# Patient Record
Sex: MALE | Race: BLACK OR AFRICAN AMERICAN | NOT HISPANIC OR LATINO | URBAN - METROPOLITAN AREA
[De-identification: names, ages, dates, MRNs, and addresses within clinical notes are randomized per-mention and may not be internally consistent; named-entity substitution may affect disease eponyms.]

---

## 2021-08-18 ENCOUNTER — EMERGENCY (EMERGENCY)
Facility: HOSPITAL | Age: 41
LOS: 1 days | Discharge: HOME | End: 2021-08-18
Admitting: EMERGENCY MEDICINE
Payer: MEDICAID

## 2021-08-18 VITALS
RESPIRATION RATE: 19 BRPM | OXYGEN SATURATION: 100 % | TEMPERATURE: 98 F | DIASTOLIC BLOOD PRESSURE: 87 MMHG | SYSTOLIC BLOOD PRESSURE: 119 MMHG | HEART RATE: 100 BPM

## 2021-08-18 PROCEDURE — 99284 EMERGENCY DEPT VISIT MOD MDM: CPT

## 2021-08-18 NOTE — ED ADULT TRIAGE NOTE - CHIEF COMPLAINT QUOTE
Pt c/o right arm pain/tingling since sunday worsening today. Returned from 48hr drive to [place] today. Denies chest pain, n/v, injury, heavy lifting, PMHX/Medications

## 2021-08-19 VITALS
RESPIRATION RATE: 18 BRPM | SYSTOLIC BLOOD PRESSURE: 123 MMHG | HEART RATE: 74 BPM | OXYGEN SATURATION: 100 % | DIASTOLIC BLOOD PRESSURE: 89 MMHG

## 2021-08-19 RX ORDER — IBUPROFEN 200 MG
800 TABLET ORAL ONCE
Refills: 0 | Status: COMPLETED | OUTPATIENT
Start: 2021-08-19 | End: 2021-08-19

## 2021-08-19 RX ORDER — KETOROLAC TROMETHAMINE 30 MG/ML
30 SYRINGE (ML) INJECTION ONCE
Refills: 0 | Status: DISCONTINUED | OUTPATIENT
Start: 2021-08-19 | End: 2021-08-19

## 2021-08-19 RX ORDER — LIDOCAINE 4 G/100G
1 CREAM TOPICAL ONCE
Refills: 0 | Status: COMPLETED | OUTPATIENT
Start: 2021-08-19 | End: 2021-08-19

## 2021-08-19 RX ORDER — DIAZEPAM 5 MG
5 TABLET ORAL ONCE
Refills: 0 | Status: DISCONTINUED | OUTPATIENT
Start: 2021-08-19 | End: 2021-08-19

## 2021-08-19 RX ORDER — LIDOCAINE 4 G/100G
1 CREAM TOPICAL ONCE
Refills: 0 | Status: DISCONTINUED | OUTPATIENT
Start: 2021-08-19 | End: 2021-08-19

## 2021-08-19 RX ADMIN — Medication 5 MILLIGRAM(S): at 00:11

## 2021-08-19 RX ADMIN — LIDOCAINE 1 PATCH: 4 CREAM TOPICAL at 01:04

## 2021-08-19 RX ADMIN — Medication 30 MILLIGRAM(S): at 00:11

## 2021-08-19 RX ADMIN — Medication 800 MILLIGRAM(S): at 01:05

## 2021-08-19 RX ADMIN — LIDOCAINE 1 PATCH: 4 CREAM TOPICAL at 00:24

## 2021-08-19 NOTE — ED PROVIDER NOTE - NSFOLLOWUPINSTRUCTIONS_ED_ALL_ED_FT
LOV: 6/19/17  RTC: 2 weeks  Last Labs: CT shows pneumonia to see pulmonologist  Future Appointments  Date Time Provider Attila Kearns   7/5/2017 12:20 PM Ignacio Salas MD [facility] [address]   5/4/2018 11:00 AM Skip Hill  [address] Cervical radiculopathy is a painful condition that happens when a spinal nerve in your neck is pinched or irritated.     Vertebral Column    DISCHARGE INSTRUCTIONS:    Medicines: You may need any of the following:  •NSAIDs help decrease swelling and pain. This medicine can be bought without a doctor's order. This medicine can cause stomach bleeding or kidney problems in certain people. If you take blood thinner medicine, always ask your healthcare provider if NSAIDs are safe for you. Always read the medicine label and follow the directions on it before using this medicine.    •Prescription pain medicine helps decrease pain. Do not wait until the pain is severe before you take this medicine.    •Steroids help decrease pain and swelling. These may be given as a pill or as an injection in your neck. You may need more than 1 injection if your symptoms do not improve after the first treatment.     •Take your medicine as directed. Contact your healthcare provider if you think your medicine is not helping or if you have side effects. Tell him of her if you are allergic to any medicine. Keep a list of the medicines, vitamins, and herbs you take. Include the amounts, and when and why you take them. Bring the list or the pill bottles to follow-up visits. Carry your medicine list with you in case of an emergency.    Follow up with your healthcare provider or spine specialist as directed: Write down your questions so you remember to ask them during your visits.     Physical therapy: Your healthcare provider may suggest physical therapy to stretch and strengthen your muscles. Your physical therapist can teach you how to improve your posture and the way you hold your neck. He may also teach you how to be safely active and avoid further injury. He can also help you develop an exercise program that is safe for your back and neck.     Self-care:   •Ice helps decrease swelling and pain. Ice may also help prevent tissue damage. Use an ice pack, or put crushed ice in a plastic bag. Cover it with a towel and place it on your neck for 15 to 20 minutes every hour or as directed.    •Rest when you feel it is needed. Slowly start to do more each day. Return to your daily activities as directed.     •Wear a soft collar. You may be given a soft collar to support your neck while you sleep. Wear the soft collar only as directed.  Cervical Collars     •Do light stretches and regular exercise. Your healthcare provider may suggest light stretches to help decrease stiffness in your neck and arm as you recover. After your pain is controlled, you may benefit from regular exercise. Ask what type of exercise is safe for your back and neck.     •Review your work area. A comfortable work area can help prevent neck strain. Ask your employer for an ergonomic review to check the position of your desk, chair, phone, and computer. Make any necessary adjustments for your comfort.     Contact your healthcare provider or spine specialist if:   •You have a fever.    •You are losing weight without trying.    •Your pain is worse, even with medicine.    •One or both hands feel more numb than before, or you cannot move your fingers well.    •You have questions or concerns about your condition or care.

## 2021-08-19 NOTE — ED PROVIDER NOTE - CLINICAL SUMMARY MEDICAL DECISION MAKING FREE TEXT BOX
patient presenting with radiating tingling from neck to RUE. no weakness or sensory deficits appreciated. patient will be treated with analgesic, muscle relaxant, lidocaine patch. he will be discahrge and provided referral to ortho clinic

## 2021-08-19 NOTE — ED PROVIDER NOTE - OBJECTIVE STATEMENT
patient is a 42 y/o M with no sig pmhx presenting with right sided shoulder pain that is radiating from his neck to the distal aspect of his right arm. He endorses strenous activity prior to the onset of symptoms. He denies chest pain, numbness, weakness, loss of motor function, ha, fever, chills.

## 2021-08-19 NOTE — ED ADULT NURSE NOTE - OBJECTIVE STATEMENT
41 year old male presents A&Ox4, complaining of right arm pain 7/10 x1 week. Pt states he has been doing a lot of heavy lifting and returned from a long drive this weekend. Pt states pain is like a cramping sensation and does not radiate anywhere. Respirations even and unlabored, speaking in full sentences without any difficulty, sating 100% on room air. Pt denies any chest pain, dyspnea, dizziness, blurry vision, nausea, vomiting or diarrhea. Pulse motor sensation and strength equal and present in all 4 extremities. Pt well appearing and in NAD at this time. Ambulatory with steady gait. Will continue to monitor.

## 2021-08-19 NOTE — ED PROVIDER NOTE - PATIENT PORTAL LINK FT
You can access the FollowMyHealth Patient Portal offered by [facility] by registering at the following website: [email or web address]. By joining [facility]’s FollowMyHealth portal, you will also be able to view your health information using other applications (apps) compatible with our system.

## 2021-08-19 NOTE — ED PROVIDER NOTE - MUSCULOSKELETAL, MLM
+ tenderness to the right trapezius muscle. FROM of shoulder/elbow/wrist. no sensory deficits appreciated. pulses intact.Spine appears normal, range of motion is not limited, no muscle or joint tenderness

## 2021-08-19 NOTE — ED PROVIDER NOTE - CCCP TRG CHIEF CMPLNT
Problem: At Risk for Falls  Goal: # Patient does not fall  Outcome: Outcome Met, Continue evaluating goal progress toward completion     Patient educated on fall prevention measures. Call light within reach, frequent monitoring throughout the night. Bed alarm on   right arm pain/tingling

## 2021-08-21 ENCOUNTER — INPATIENT (INPATIENT)
Facility: HOSPITAL | Age: 41
LOS: 8 days | Discharge: ANOTHER HOSPITAL | End: 2021-08-30
Attending: INTERNAL MEDICINE | Admitting: INTERNAL MEDICINE
Payer: MEDICAID

## 2021-08-21 VITALS
OXYGEN SATURATION: 99 % | HEART RATE: 95 BPM | SYSTOLIC BLOOD PRESSURE: 130 MMHG | RESPIRATION RATE: 18 BRPM | TEMPERATURE: 99 F | DIASTOLIC BLOOD PRESSURE: 79 MMHG

## 2021-08-21 DIAGNOSIS — I20.9 ANGINA PECTORIS, UNSPECIFIED: ICD-10-CM

## 2021-08-21 DIAGNOSIS — M54.2 CERVICALGIA: ICD-10-CM

## 2021-08-21 DIAGNOSIS — M25.511 PAIN IN RIGHT SHOULDER: ICD-10-CM

## 2021-08-21 DIAGNOSIS — F17.200 NICOTINE DEPENDENCE, UNSPECIFIED, UNCOMPLICATED: ICD-10-CM

## 2021-08-21 LAB
ALBUMIN SERPL ELPH-MCNC: 4 G/DL — SIGNIFICANT CHANGE UP (ref 3.3–5)
ALP SERPL-CCNC: 79 U/L — SIGNIFICANT CHANGE UP (ref 40–120)
ALT FLD-CCNC: SIGNIFICANT CHANGE UP U/L (ref 4–41)
ANION GAP SERPL CALC-SCNC: 12 MMOL/L — SIGNIFICANT CHANGE UP (ref 7–14)
AST SERPL-CCNC: SIGNIFICANT CHANGE UP U/L (ref 4–40)
BASOPHILS # BLD AUTO: 0.07 K/UL — SIGNIFICANT CHANGE UP (ref 0–0.2)
BASOPHILS NFR BLD AUTO: 0.6 % — SIGNIFICANT CHANGE UP (ref 0–2)
BILIRUB SERPL-MCNC: 0.2 MG/DL — SIGNIFICANT CHANGE UP (ref 0.2–1.2)
BUN SERPL-MCNC: 13 MG/DL — SIGNIFICANT CHANGE UP (ref 7–23)
CALCIUM SERPL-MCNC: 9.1 MG/DL — SIGNIFICANT CHANGE UP (ref 8.4–10.5)
CHLORIDE SERPL-SCNC: 104 MMOL/L — SIGNIFICANT CHANGE UP (ref 98–107)
CHOLEST SERPL-MCNC: 227 MG/DL — HIGH
CK SERPL-CCNC: 270 U/L — HIGH (ref 30–200)
CO2 SERPL-SCNC: 24 MMOL/L — SIGNIFICANT CHANGE UP (ref 22–31)
CREAT SERPL-MCNC: 1.07 MG/DL — SIGNIFICANT CHANGE UP (ref 0.5–1.3)
D DIMER BLD IA.RAPID-MCNC: <150 NG/ML DDU — SIGNIFICANT CHANGE UP
EOSINOPHIL # BLD AUTO: 0.58 K/UL — HIGH (ref 0–0.5)
EOSINOPHIL NFR BLD AUTO: 5.3 % — SIGNIFICANT CHANGE UP (ref 0–6)
GLUCOSE SERPL-MCNC: 104 MG/DL — HIGH (ref 70–99)
HCT VFR BLD CALC: 46.3 % — SIGNIFICANT CHANGE UP (ref 39–50)
HDLC SERPL-MCNC: 21 MG/DL — LOW
HGB BLD-MCNC: 15.5 G/DL — SIGNIFICANT CHANGE UP (ref 13–17)
IANC: 6.89 K/UL — SIGNIFICANT CHANGE UP (ref 1.5–8.5)
IMM GRANULOCYTES NFR BLD AUTO: 0.3 % — SIGNIFICANT CHANGE UP (ref 0–1.5)
LIPID PNL WITH DIRECT LDL SERPL: 47 MG/DL — SIGNIFICANT CHANGE UP
LYMPHOCYTES # BLD AUTO: 2.94 K/UL — SIGNIFICANT CHANGE UP (ref 1–3.3)
LYMPHOCYTES # BLD AUTO: 26.7 % — SIGNIFICANT CHANGE UP (ref 13–44)
MCHC RBC-ENTMCNC: 29.4 PG — SIGNIFICANT CHANGE UP (ref 27–34)
MCHC RBC-ENTMCNC: 33.5 GM/DL — SIGNIFICANT CHANGE UP (ref 32–36)
MCV RBC AUTO: 87.7 FL — SIGNIFICANT CHANGE UP (ref 80–100)
MONOCYTES # BLD AUTO: 0.51 K/UL — SIGNIFICANT CHANGE UP (ref 0–0.9)
MONOCYTES NFR BLD AUTO: 4.6 % — SIGNIFICANT CHANGE UP (ref 2–14)
NEUTROPHILS # BLD AUTO: 6.89 K/UL — SIGNIFICANT CHANGE UP (ref 1.8–7.4)
NEUTROPHILS NFR BLD AUTO: 62.5 % — SIGNIFICANT CHANGE UP (ref 43–77)
NON HDL CHOLESTEROL: 206 MG/DL — HIGH
NRBC # BLD: 0 /100 WBCS — SIGNIFICANT CHANGE UP
NRBC # FLD: 0 K/UL — SIGNIFICANT CHANGE UP
PLATELET # BLD AUTO: 309 K/UL — SIGNIFICANT CHANGE UP (ref 150–400)
POTASSIUM SERPL-MCNC: 4.5 MMOL/L — SIGNIFICANT CHANGE UP (ref 3.5–5.3)
POTASSIUM SERPL-SCNC: 4.5 MMOL/L — SIGNIFICANT CHANGE UP (ref 3.5–5.3)
PROT SERPL-MCNC: 6.7 G/DL — SIGNIFICANT CHANGE UP (ref 6–8.3)
RBC # BLD: 5.28 M/UL — SIGNIFICANT CHANGE UP (ref 4.2–5.8)
RBC # FLD: 13.7 % — SIGNIFICANT CHANGE UP (ref 10.3–14.5)
SARS-COV-2 RNA SPEC QL NAA+PROBE: SIGNIFICANT CHANGE UP
SODIUM SERPL-SCNC: 140 MMOL/L — SIGNIFICANT CHANGE UP (ref 135–145)
TRIGL SERPL-MCNC: 793 MG/DL — HIGH
TROPONIN T, HIGH SENSITIVITY RESULT: 18 NG/L — SIGNIFICANT CHANGE UP
TROPONIN T, HIGH SENSITIVITY RESULT: 18 NG/L — SIGNIFICANT CHANGE UP
TROPONIN T, HIGH SENSITIVITY RESULT: 20 NG/L — SIGNIFICANT CHANGE UP
WBC # BLD: 11.02 K/UL — HIGH (ref 3.8–10.5)
WBC # FLD AUTO: 11.02 K/UL — HIGH (ref 3.8–10.5)

## 2021-08-21 PROCEDURE — 99223 1ST HOSP IP/OBS HIGH 75: CPT

## 2021-08-21 PROCEDURE — 73060 X-RAY EXAM OF HUMERUS: CPT | Mod: 26,RT

## 2021-08-21 PROCEDURE — 71046 X-RAY EXAM CHEST 2 VIEWS: CPT | Mod: 26

## 2021-08-21 PROCEDURE — 99284 EMERGENCY DEPT VISIT MOD MDM: CPT | Mod: 25

## 2021-08-21 PROCEDURE — 73090 X-RAY EXAM OF FOREARM: CPT | Mod: 26,RT

## 2021-08-21 PROCEDURE — 73030 X-RAY EXAM OF SHOULDER: CPT | Mod: 26,RT

## 2021-08-21 PROCEDURE — 73070 X-RAY EXAM OF ELBOW: CPT | Mod: 26,RT

## 2021-08-21 PROCEDURE — 93010 ELECTROCARDIOGRAM REPORT: CPT

## 2021-08-21 RX ORDER — ASPIRIN/CALCIUM CARB/MAGNESIUM 324 MG
324 TABLET ORAL ONCE
Refills: 0 | Status: COMPLETED | OUTPATIENT
Start: 2021-08-21 | End: 2021-08-21

## 2021-08-21 RX ORDER — ATORVASTATIN CALCIUM 80 MG/1
40 TABLET, FILM COATED ORAL AT BEDTIME
Refills: 0 | Status: DISCONTINUED | OUTPATIENT
Start: 2021-08-21 | End: 2021-08-30

## 2021-08-21 RX ORDER — OXYCODONE AND ACETAMINOPHEN 5; 325 MG/1; MG/1
1 TABLET ORAL ONCE
Refills: 0 | Status: DISCONTINUED | OUTPATIENT
Start: 2021-08-21 | End: 2021-08-21

## 2021-08-21 RX ORDER — IBUPROFEN 200 MG
400 TABLET ORAL EVERY 8 HOURS
Refills: 0 | Status: DISCONTINUED | OUTPATIENT
Start: 2021-08-21 | End: 2021-08-22

## 2021-08-21 RX ADMIN — Medication 324 MILLIGRAM(S): at 18:45

## 2021-08-21 RX ADMIN — OXYCODONE AND ACETAMINOPHEN 1 TABLET(S): 5; 325 TABLET ORAL at 18:55

## 2021-08-21 RX ADMIN — OXYCODONE AND ACETAMINOPHEN 1 TABLET(S): 5; 325 TABLET ORAL at 17:49

## 2021-08-21 NOTE — ED ADULT NURSE NOTE - TEMPLATE
Sore throat slightly improved, swollen cervical glands; pt lost voice. Pt denies: fever, cough r/t talking, SOB. Pt denies: recent travel, known contact with confirmed covid19 pt.  Pt tried gargling with slat water, drinking lots of tea, Tylenol, daytime co General

## 2021-08-21 NOTE — ED ADULT NURSE NOTE - OBJECTIVE STATEMENT
pt is a 41 yr old male c/o increased right shoulder and right hand pain, aching x2 weeks. pt seen and treated in ED, non cardiac at the time. pt showing same poor ekg from pervious visit. pt denies chest pain, ha, dizziness, lightheadedness, trama. #20 g piv in left ac, labs sent. 22yr pack/day smoker.

## 2021-08-21 NOTE — H&P ADULT - NSHPLABSRESULTS_GEN_ALL_CORE
15.5   11.02 )-----------( 309      ( 21 Aug 2021 17:17 )             46.3       08-21    140  |  104  |  13  ----------------------------<  104<H>  4.5   |  24  |  1.07    Ca    9.1      21 Aug 2021 17:17    TPro  6.7  /  Alb  4.0  /  TBili  0.2  /  DBili  x   /  AST  TNP  /  ALT  TNP  /  AlkPhos  79  08-21

## 2021-08-21 NOTE — H&P ADULT - NSHPPHYSICALEXAM_GEN_ALL_CORE
PHYSICAL EXAM:  VITALS: Vital Signs Last 24 Hrs  T(C): 36.7 (21 Aug 2021 18:15), Max: 37 (21 Aug 2021 16:23)  T(F): 98.1 (21 Aug 2021 18:15), Max: 98.6 (21 Aug 2021 16:23)  HR: 87 (21 Aug 2021 21:15) (86 - 95)  BP: 129/100 (21 Aug 2021 21:15) (129/100 - 135/99)  BP(mean): --  RR: 19 (21 Aug 2021 21:15) (18 - 19)  SpO2: 97% (21 Aug 2021 21:15) (97% - 99%)  GENERAL: NAD, well-developed, well-nourished  HEAD:  Atraumatic, Normocephalic  EYES: EOMI, PERRL, conjunctiva and sclera clear  ENT: Moist Mucus Membranes present, no ulcers appreciated  NECK: Supple, No JVD  CHEST/LUNG: Clear to auscultation bilaterally; No wheezes, rales or rhonchi  HEART: Regular rate and rhythm; No murmurs, rubs, or gallops, (+)S1, S2  ABDOMEN: Soft, Nontender, Nondistended; Normal Bowel sounds   EXTREMITIES:  2+ Peripheral Pulses, No clubbing, cyanosis, or edema, + R shoulder and R arm tenderness with neck extension and abduction of R arm  PSYCH: normal mood and affect  NEUROLOGY: AAOx3, non-focal  SKIN: No rashes or lesions

## 2021-08-21 NOTE — ED PROVIDER NOTE - OBJECTIVE STATEMENT
Dr Woodall  40yo M no sig pmhx pw right arm pain x 5 days. Constant, non exertional, non pleuritic right arm pain that radiates from the neck. Denies any trauma. Endorse he "lifts a lot of stuff at work" prior to pain starting. No associated n/v/d no palpitations. Denies any chest pain or SOB no diaphoresis no travel. No numbness or weakness Taking tylenol for pain w no relief. Vaccinated for covid No sig family hx of CAD. Doesn't have a cardiologist. no hx of stress/echo. Dr Woodall  40yo M no sig pmhx pw right arm pain x 5 days. Constant, non exertional, non pleuritic right arm pain that radiates from the neck. Denies any trauma. Endorse he "lifts a lot of stuff at work" prior to pain starting. No associated n/v/d no palpitations. Denies any chest pain or SOB no diaphoresis no travel. No numbness or weakness Taking tylenol for pain w no relief. Vaccinated for covid No sig family hx of CAD. Doesn't have a cardiologist. no hx of stress/echo. Pt from [place]

## 2021-08-21 NOTE — H&P ADULT - ASSESSMENT
This is a 40 y/o M with no pmhx presented for R shoulder pain started acutely 6 days ago. Incidentally found to have mild ST changes. Trops negative. Admitted for ACS work up and evaluation of R shoulder pain.

## 2021-08-21 NOTE — ED PROVIDER NOTE - PHYSICAL EXAMINATION
Dr Woodall  well appearing male   normal S1-S2 no reproducible chest wall tenderness   No resp distress. able to speak in full and clear sentences. no wheeze, rales or stridor.  nl rom of both arms. nl . no rash or erythema of both shoulders/elbow/wrist . no vesicles. no swelling of both arms. nl sensation  no midline TTP cervical/thoracic or lumbar spine. no ecchymosis of back   soft nontender abdomen. no  rebound. no guarding. no sign of trauma. no CVAT   no pedal edema. no calf tenderness. normal pulses bilateral feet.

## 2021-08-21 NOTE — ED PROVIDER NOTE - PROGRESS NOTE DETAILS
PT denies any cp or so. Endorse right arm pain/shoulder pain. Repeat EKG: HR 78 SR w IAN III, amd inversion avl, v6. Contacted Dr Mendez, interventional cardiology, given EKG. Send ekgs for his review. will call back. Dr Mendez recommends repeat trop to trend, no cath at this time. Cardiology np to see pt. pt stable No change in trop, Cardiology NP recommends tele doctor, she will talk with Dr Mendez. pt has no cp will admit to tele doc

## 2021-08-21 NOTE — H&P ADULT - HISTORY OF PRESENT ILLNESS
This is a 40 y/o M with no pmhx presented for R shoulder pain started acutely 6 days ago. The patient was lifting heavy objects in his house before onset of pain. Pain started on the R neck and would radiate downward towards his arms. Denies trauma to the area. Denies weakness to the R arm. Pain is sharp like. Worse with movement when abducting his arms and flexion upward. Pain is constant. Denies numbness or tingling. Physical exam at bedside showed reproducible R shoulder/arm pain when the patient's head is extended backward and R arm raised upward.    He was in the ED 2 days ago for the same complaint, was discharged with pain meds.    He is also an active smoker. Patient is admitted for ACS work up. Currently denies active chest pain. However he does endorse occasional pressure-like chest pain when he is running. Active smoker of 1ppk/day. Denies hx of HLD, HTN or DM. No family hx of CAD or MI. Does not see a cardiologist. EKG at bedside showed Lead II, III and AvF IAN 1 mm, V6 T wave inversions, which was the same from 2 days ago.     Denies hx of surgeries or metal in body This is a 40 y/o M with no pmhx presented for R shoulder pain started acutely 6 days ago. The patient was lifting heavy objects in his house before onset of pain. Pain started on the R neck and would radiate downward towards his arms. Denies trauma to the area. Denies weakness to the R arm. Pain is sharp like. Worse with movement when abducting his arms and flexion upward. Pain is constant. Denies numbness or tingling. Physical exam at bedside showed reproducible R shoulder/arm pain when the patient's head is extended backward and R arm raised upward.    He was in the ED 2 days ago for the same complaint, was discharged with pain meds.    He is also an active smoker. Patient is admitted for ACS work up. Currently denies active chest pain. However he does endorse occasional pressure-like chest pain when he is running. Active smoker of 1ppk/day. Denies hx of HLD, HTN or DM. No family hx of CAD or MI. Does not see a cardiologist. EKG at bedside showed Lead II, III and AvF IAN 1 mm, V6 T wave inversions, which was the same from 2 days ago.     Denies hx of surgeries or metal in body. Does not take daily medications.

## 2021-08-21 NOTE — ED ADULT TRIAGE NOTE - CHIEF COMPLAINT QUOTE
Pt c/o R arm pain since last Sunday. Pt seen here on 8/18/21 with same S&S, had relief but pain worsening again. Denies numbness/tingling. Denies any pertinent medical Hx. Pt c/o R arm pain since last Sunday. Pt seen here on 8/18/21 with same S&S, had relief but pain worsening again. Denies numbness/tingling. Denies any pertinent medical Hx.]    Abn EKG, charge made aware

## 2021-08-21 NOTE — H&P ADULT - NSHPREVIEWOFSYSTEMS_GEN_ALL_CORE
REVIEW OF SYSTEMS:    CONSTITUTIONAL: No weakness, fevers or chills  EYES/ENT: No visual changes;  No dysphagia; No sore throat; No rhinorrhea; No sinus pain/pressure  NECK: + neck pain, no stiffness  RESPIRATORY: No cough, wheezing, hemoptysis; No shortness of breath  CARDIOVASCULAR: No chest pain or palpitations; No lower extremity edema  GASTROINTESTINAL: No abdominal or epigastric pain. No nausea, vomiting, or hematemesis; No diarrhea or constipation. No melena or hematochezia.  GENITOURINARY: No dysuria, frequency or hematuria  NEUROLOGICAL: No numbness or weakness  MSK: ambulates without assistance, + R shoulder pain  SKIN: No itching, burning, rashes, or lesions   All other review of systems is negative unless indicated above.

## 2021-08-21 NOTE — ED ADULT NURSE NOTE - CHIEF COMPLAINT QUOTE
Pt c/o R arm pain since last Sunday. Pt seen here on 8/18/21 with same S&S, had relief but pain worsening again. Denies numbness/tingling. Denies any pertinent medical Hx.]    Abn EKG, charge made aware

## 2021-08-21 NOTE — H&P ADULT - NSICDXFAMILYHX_GEN_ALL_CORE_FT
FAMILY HISTORY:  Mother  Still living? Unknown  Family history of diabetes mellitus (DM), Age at diagnosis: Age Unknown  FH: HTN (hypertension), Age at diagnosis: Age Unknown  FH: rheumatoid arthritis, Age at diagnosis: Age Unknown

## 2021-08-21 NOTE — H&P ADULT - PROBLEM SELECTOR PLAN 1
Assessment:  - the patient presented for R shoulder pain which was thought to be related to ACS  - Patient currently denies all chest pain, however does endorse anginal chest pain with running occasionally. Never had work up previously. Hx of smoking  - EKG shows Lead II, III, AvF 1mm IAN and V6 T wave inversion, which is similar to previous EKG 2 days ago, no changes  - Troponins negative (18->20)  - patient unlikely to have active ACS at this time due to neg tropx x2 and unchanged EKG. Patient currently sitting at bedside comfortable with no symptoms of ACS. Not in acute distress.    Plan:  - due to hx of active smoking and EKG findings, and findings of anginal chest pain, would recommend stress test for evaluation of ischemic heart disease, consider cath if stress test is positive  - cardiology consult  - obtain echo  - no indication for heparin drip at this time because the patient does not have active chest pain and neg troponins  - will obtain repeat STAT troponins, repeat EKG tonight  - will obtain Lipid profile and A1c for risk stratification Assessment:  - the patient presented for R shoulder pain which was thought to be related to ACS  - Patient currently denies all chest pain, however does endorse anginal chest pain with running occasionally. Never had work up previously. Hx of smoking  - EKG shows Lead II, III, AvF 1mm IAN and V6 T wave inversion, which is similar to previous EKG 2 days ago, no changes  - Troponins negative (18->20)  - patient unlikely to have active ACS at this time due to neg tropx x2 and unchanged EKG. Patient currently sitting at bedside comfortable with no symptoms of ACS. Not in acute distress.  - d-dimer neg - unlikely PE    Plan:  - due to hx of active smoking and EKG findings, and findings of anginal chest pain, would recommend stress test for evaluation of ischemic heart disease, consider cath if stress test is positive  - cardiology consult  - obtain echo  - no indication for heparin drip at this time because the patient does not have active chest pain and neg troponins  - will obtain repeat STAT troponins, repeat EKG tonight  - will obtain Lipid profile and A1c for risk stratification Assessment:  - the patient presented for R shoulder pain which was thought to be related to ACS  - Patient currently denies all chest pain, however does endorse anginal chest pain with running occasionally. Never had work up previously. Hx of smoking  - EKG shows Lead II, III, AvF 1mm IAN and V6 T wave inversion, which is similar to previous EKG 2 days ago, no changes  - Troponins negative (18->20)  - patient unlikely to have active ACS at this time due to neg tropx x2 and unchanged EKG. Patient currently sitting at bedside comfortable with no symptoms of ACS. Not in acute distress.  - d-dimer neg - unlikely PE    Plan:  - due to hx of active smoking and EKG findings, and findings of anginal chest pain, would recommend stress test for evaluation of ischemic heart disease, consider cath if stress test is positive  - cardiology consult  - obtain echo  - no indication for heparin drip at this time because the patient does not have active chest pain and neg troponins  - will obtain repeat STAT troponins, repeat EKG tonight - if pos, will start heparin drip  - tele monitoring  - will obtain Lipid profile and A1c for risk stratification Assessment:  - the patient presented for R shoulder pain which was thought to be related to ACS  - Patient currently denies all chest pain, however does endorse anginal chest pain with running occasionally. Never had work up previously. Hx of smoking  - EKG shows Lead II, III, AvF 1mm IAN and V6 T wave inversion, which is similar to previous EKG 2 days ago, no changes  - Troponins negative (18->20)  - patient unlikely to have active ACS at this time due to neg tropx x2 and unchanged EKG. Patient currently sitting at bedside comfortable with no symptoms of ACS. Not in acute distress.  - d-dimer neg - unlikely PE    Plan:  - due to hx of active smoking and EKG findings, and findings of anginal chest pain, would recommend stress test for evaluation of ischemic heart disease, consider cath if stress test is positive  - cardiology consult - I spoke to the Cardio PA, who spoke to Dr. Mendez. There is no indication for emergent cath at this time as per Dr. Mendez  - obtain echo  - no indication for heparin drip at this time because the patient does not have active chest pain and neg troponins  - will obtain repeat STAT troponins, repeat EKG tonight - if pos, will start heparin drip  - tele monitoring  - will obtain Lipid profile and A1c for risk stratification

## 2021-08-21 NOTE — H&P ADULT - PROBLEM SELECTOR PLAN 2
Assessment:  - reproducible pain when neck extended backward and R arm abducted. No weakness on exam  - pain is likely secondary to muscle sprain secondary to physical stress  - x-rays shoulders are all unremarkable    Plan:  - start ibuprofen 400 mg Q8hr  - if there is burning pain, would start gabapentin  - obtain cervical x-ray to r/o fractures and evaluate for osteoarthritic changes  - MRI can be done outpatient, can consider inpatient if pain does not improve

## 2021-08-22 LAB
A1C WITH ESTIMATED AVERAGE GLUCOSE RESULT: 5.7 % — HIGH (ref 4–5.6)
ALBUMIN SERPL ELPH-MCNC: 4.1 G/DL — SIGNIFICANT CHANGE UP (ref 3.3–5)
ALP SERPL-CCNC: 74 U/L — SIGNIFICANT CHANGE UP (ref 40–120)
ALT FLD-CCNC: 13 U/L — SIGNIFICANT CHANGE UP (ref 4–41)
ANION GAP SERPL CALC-SCNC: 13 MMOL/L — SIGNIFICANT CHANGE UP (ref 7–14)
APTT BLD: 34.9 SEC — SIGNIFICANT CHANGE UP (ref 27–36.3)
AST SERPL-CCNC: 19 U/L — SIGNIFICANT CHANGE UP (ref 4–40)
BASOPHILS # BLD AUTO: 0.06 K/UL — SIGNIFICANT CHANGE UP (ref 0–0.2)
BASOPHILS NFR BLD AUTO: 0.6 % — SIGNIFICANT CHANGE UP (ref 0–2)
BILIRUB SERPL-MCNC: 0.3 MG/DL — SIGNIFICANT CHANGE UP (ref 0.2–1.2)
BUN SERPL-MCNC: 12 MG/DL — SIGNIFICANT CHANGE UP (ref 7–23)
CALCIUM SERPL-MCNC: 9.1 MG/DL — SIGNIFICANT CHANGE UP (ref 8.4–10.5)
CHLORIDE SERPL-SCNC: 106 MMOL/L — SIGNIFICANT CHANGE UP (ref 98–107)
CO2 SERPL-SCNC: 22 MMOL/L — SIGNIFICANT CHANGE UP (ref 22–31)
COVID-19 SPIKE DOMAIN AB INTERP: POSITIVE
COVID-19 SPIKE DOMAIN ANTIBODY RESULT: >250 U/ML — HIGH
CREAT SERPL-MCNC: 0.9 MG/DL — SIGNIFICANT CHANGE UP (ref 0.5–1.3)
EOSINOPHIL # BLD AUTO: 0.7 K/UL — HIGH (ref 0–0.5)
EOSINOPHIL NFR BLD AUTO: 6.5 % — HIGH (ref 0–6)
ESTIMATED AVERAGE GLUCOSE: 117 — SIGNIFICANT CHANGE UP
GLUCOSE SERPL-MCNC: 97 MG/DL — SIGNIFICANT CHANGE UP (ref 70–99)
HCT VFR BLD CALC: 49.3 % — SIGNIFICANT CHANGE UP (ref 39–50)
HGB BLD-MCNC: 16.6 G/DL — SIGNIFICANT CHANGE UP (ref 13–17)
IANC: 5.64 K/UL — SIGNIFICANT CHANGE UP (ref 1.5–8.5)
IMM GRANULOCYTES NFR BLD AUTO: 0.5 % — SIGNIFICANT CHANGE UP (ref 0–1.5)
INR BLD: 1.01 RATIO — SIGNIFICANT CHANGE UP (ref 0.88–1.16)
LYMPHOCYTES # BLD AUTO: 3.83 K/UL — HIGH (ref 1–3.3)
LYMPHOCYTES # BLD AUTO: 35.6 % — SIGNIFICANT CHANGE UP (ref 13–44)
MAGNESIUM SERPL-MCNC: 2.3 MG/DL — SIGNIFICANT CHANGE UP (ref 1.6–2.6)
MCHC RBC-ENTMCNC: 29.3 PG — SIGNIFICANT CHANGE UP (ref 27–34)
MCHC RBC-ENTMCNC: 33.7 GM/DL — SIGNIFICANT CHANGE UP (ref 32–36)
MCV RBC AUTO: 87.1 FL — SIGNIFICANT CHANGE UP (ref 80–100)
MONOCYTES # BLD AUTO: 0.47 K/UL — SIGNIFICANT CHANGE UP (ref 0–0.9)
MONOCYTES NFR BLD AUTO: 4.4 % — SIGNIFICANT CHANGE UP (ref 2–14)
NEUTROPHILS # BLD AUTO: 5.64 K/UL — SIGNIFICANT CHANGE UP (ref 1.8–7.4)
NEUTROPHILS NFR BLD AUTO: 52.4 % — SIGNIFICANT CHANGE UP (ref 43–77)
NRBC # BLD: 0 /100 WBCS — SIGNIFICANT CHANGE UP
NRBC # FLD: 0 K/UL — SIGNIFICANT CHANGE UP
PHOSPHATE SERPL-MCNC: 3.5 MG/DL — SIGNIFICANT CHANGE UP (ref 2.5–4.5)
PLATELET # BLD AUTO: 266 K/UL — SIGNIFICANT CHANGE UP (ref 150–400)
POTASSIUM SERPL-MCNC: 4.1 MMOL/L — SIGNIFICANT CHANGE UP (ref 3.5–5.3)
POTASSIUM SERPL-SCNC: 4.1 MMOL/L — SIGNIFICANT CHANGE UP (ref 3.5–5.3)
PROT SERPL-MCNC: 6.6 G/DL — SIGNIFICANT CHANGE UP (ref 6–8.3)
PROTHROM AB SERPL-ACNC: 11.5 SEC — SIGNIFICANT CHANGE UP (ref 10.6–13.6)
RBC # BLD: 5.66 M/UL — SIGNIFICANT CHANGE UP (ref 4.2–5.8)
RBC # FLD: 13.8 % — SIGNIFICANT CHANGE UP (ref 10.3–14.5)
SARS-COV-2 IGG+IGM SERPL QL IA: >250 U/ML — HIGH
SARS-COV-2 IGG+IGM SERPL QL IA: POSITIVE
SODIUM SERPL-SCNC: 141 MMOL/L — SIGNIFICANT CHANGE UP (ref 135–145)
TSH SERPL-MCNC: 2.64 UIU/ML — SIGNIFICANT CHANGE UP (ref 0.27–4.2)
WBC # BLD: 10.75 K/UL — HIGH (ref 3.8–10.5)
WBC # FLD AUTO: 10.75 K/UL — HIGH (ref 3.8–10.5)

## 2021-08-22 RX ORDER — OXYCODONE AND ACETAMINOPHEN 5; 325 MG/1; MG/1
2 TABLET ORAL EVERY 6 HOURS
Refills: 0 | Status: DISCONTINUED | OUTPATIENT
Start: 2021-08-22 | End: 2021-08-29

## 2021-08-22 RX ORDER — ASPIRIN/CALCIUM CARB/MAGNESIUM 324 MG
81 TABLET ORAL DAILY
Refills: 0 | Status: DISCONTINUED | OUTPATIENT
Start: 2021-08-22 | End: 2021-08-30

## 2021-08-22 RX ORDER — KETOROLAC TROMETHAMINE 30 MG/ML
30 SYRINGE (ML) INJECTION ONCE
Refills: 0 | Status: DISCONTINUED | OUTPATIENT
Start: 2021-08-22 | End: 2021-08-22

## 2021-08-22 RX ADMIN — OXYCODONE AND ACETAMINOPHEN 2 TABLET(S): 5; 325 TABLET ORAL at 14:30

## 2021-08-22 RX ADMIN — Medication 400 MILLIGRAM(S): at 09:02

## 2021-08-22 RX ADMIN — OXYCODONE AND ACETAMINOPHEN 2 TABLET(S): 5; 325 TABLET ORAL at 13:44

## 2021-08-22 RX ADMIN — OXYCODONE AND ACETAMINOPHEN 2 TABLET(S): 5; 325 TABLET ORAL at 20:44

## 2021-08-22 RX ADMIN — ATORVASTATIN CALCIUM 40 MILLIGRAM(S): 80 TABLET, FILM COATED ORAL at 21:04

## 2021-08-22 RX ADMIN — OXYCODONE AND ACETAMINOPHEN 2 TABLET(S): 5; 325 TABLET ORAL at 22:00

## 2021-08-22 RX ADMIN — Medication 81 MILLIGRAM(S): at 11:50

## 2021-08-22 NOTE — PHYSICAL THERAPY INITIAL EVALUATION ADULT - MANUAL MUSCLE TESTING RESULTS, REHAB EVAL
right UE 3+/5, left UE / both LE 5/5 Patient was given ambulation and stair negotiation training with BRUCE SHER

## 2021-08-22 NOTE — PROGRESS NOTE ADULT - TIME BILLING
- Review of records, telemetry, vital signs and daily labs.   - General and cardiovascular physical examination.  - Generation of cardiovascular treatment plan.  - Coordination of care.      Patient was seen and examined by me on 08/22/2021,interim events noted,labs and radiology studies reviewed.  Bradford Brothers MD,FACC.  [address] [place], [place] [id number].  [phone]

## 2021-08-22 NOTE — PHYSICAL THERAPY INITIAL EVALUATION ADULT - GENERAL OBSERVATIONS, REHAB EVAL
Patient seen semi supine in stretcher bed in [facility], complaint of right neck pain radiating down to UE.

## 2021-08-23 LAB
ANION GAP SERPL CALC-SCNC: 15 MMOL/L — HIGH (ref 7–14)
BUN SERPL-MCNC: 11 MG/DL — SIGNIFICANT CHANGE UP (ref 7–23)
CALCIUM SERPL-MCNC: 9.5 MG/DL — SIGNIFICANT CHANGE UP (ref 8.4–10.5)
CHLORIDE SERPL-SCNC: 99 MMOL/L — SIGNIFICANT CHANGE UP (ref 98–107)
CO2 SERPL-SCNC: 21 MMOL/L — LOW (ref 22–31)
CREAT SERPL-MCNC: 0.94 MG/DL — SIGNIFICANT CHANGE UP (ref 0.5–1.3)
GLUCOSE BLDC GLUCOMTR-MCNC: 126 MG/DL — HIGH (ref 70–99)
GLUCOSE SERPL-MCNC: 103 MG/DL — HIGH (ref 70–99)
HCT VFR BLD CALC: 51.2 % — HIGH (ref 39–50)
HGB BLD-MCNC: 16.9 G/DL — SIGNIFICANT CHANGE UP (ref 13–17)
MAGNESIUM SERPL-MCNC: 2.3 MG/DL — SIGNIFICANT CHANGE UP (ref 1.6–2.6)
MCHC RBC-ENTMCNC: 28.9 PG — SIGNIFICANT CHANGE UP (ref 27–34)
MCHC RBC-ENTMCNC: 33 GM/DL — SIGNIFICANT CHANGE UP (ref 32–36)
MCV RBC AUTO: 87.5 FL — SIGNIFICANT CHANGE UP (ref 80–100)
NRBC # BLD: 0 /100 WBCS — SIGNIFICANT CHANGE UP
NRBC # FLD: 0 K/UL — SIGNIFICANT CHANGE UP
PHOSPHATE SERPL-MCNC: 4 MG/DL — SIGNIFICANT CHANGE UP (ref 2.5–4.5)
PLATELET # BLD AUTO: 319 K/UL — SIGNIFICANT CHANGE UP (ref 150–400)
POTASSIUM SERPL-MCNC: 4.1 MMOL/L — SIGNIFICANT CHANGE UP (ref 3.5–5.3)
POTASSIUM SERPL-SCNC: 4.1 MMOL/L — SIGNIFICANT CHANGE UP (ref 3.5–5.3)
RBC # BLD: 5.85 M/UL — HIGH (ref 4.2–5.8)
RBC # FLD: 13.7 % — SIGNIFICANT CHANGE UP (ref 10.3–14.5)
SODIUM SERPL-SCNC: 135 MMOL/L — SIGNIFICANT CHANGE UP (ref 135–145)
WBC # BLD: 10.98 K/UL — HIGH (ref 3.8–10.5)
WBC # FLD AUTO: 10.98 K/UL — HIGH (ref 3.8–10.5)

## 2021-08-23 RX ORDER — KETOROLAC TROMETHAMINE 30 MG/ML
15 SYRINGE (ML) INJECTION EVERY 8 HOURS
Refills: 0 | Status: DISCONTINUED | OUTPATIENT
Start: 2021-08-23 | End: 2021-08-28

## 2021-08-23 RX ORDER — DEXAMETHASONE 0.5 MG/5ML
3 ELIXIR ORAL
Refills: 0 | Status: COMPLETED | OUTPATIENT
Start: 2021-08-26 | End: 2021-08-27

## 2021-08-23 RX ORDER — DEXAMETHASONE 0.5 MG/5ML
6 ELIXIR ORAL ONCE
Refills: 0 | Status: COMPLETED | OUTPATIENT
Start: 2021-08-23 | End: 2021-08-23

## 2021-08-23 RX ORDER — DEXAMETHASONE 0.5 MG/5ML
4 ELIXIR ORAL
Refills: 0 | Status: COMPLETED | OUTPATIENT
Start: 2021-08-24 | End: 2021-08-25

## 2021-08-23 RX ORDER — KETOROLAC TROMETHAMINE 30 MG/ML
30 SYRINGE (ML) INJECTION ONCE
Refills: 0 | Status: DISCONTINUED | OUTPATIENT
Start: 2021-08-23 | End: 2021-08-23

## 2021-08-23 RX ORDER — DEXAMETHASONE 0.5 MG/5ML
ELIXIR ORAL
Refills: 0 | Status: CANCELLED | OUTPATIENT
Start: 2021-08-24 | End: 2021-08-30

## 2021-08-23 RX ORDER — DEXAMETHASONE 0.5 MG/5ML
2 ELIXIR ORAL
Refills: 0 | Status: COMPLETED | OUTPATIENT
Start: 2021-08-28 | End: 2021-08-29

## 2021-08-23 RX ORDER — DEXAMETHASONE 0.5 MG/5ML
2 ELIXIR ORAL DAILY
Refills: 0 | Status: DISCONTINUED | OUTPATIENT
Start: 2021-08-30 | End: 2021-08-30

## 2021-08-23 RX ADMIN — OXYCODONE AND ACETAMINOPHEN 2 TABLET(S): 5; 325 TABLET ORAL at 22:53

## 2021-08-23 RX ADMIN — ATORVASTATIN CALCIUM 40 MILLIGRAM(S): 80 TABLET, FILM COATED ORAL at 21:55

## 2021-08-23 RX ADMIN — OXYCODONE AND ACETAMINOPHEN 2 TABLET(S): 5; 325 TABLET ORAL at 21:53

## 2021-08-23 RX ADMIN — OXYCODONE AND ACETAMINOPHEN 2 TABLET(S): 5; 325 TABLET ORAL at 06:17

## 2021-08-23 RX ADMIN — Medication 30 MILLIGRAM(S): at 12:59

## 2021-08-23 RX ADMIN — Medication 30 MILLIGRAM(S): at 12:29

## 2021-08-23 RX ADMIN — OXYCODONE AND ACETAMINOPHEN 2 TABLET(S): 5; 325 TABLET ORAL at 15:39

## 2021-08-23 RX ADMIN — OXYCODONE AND ACETAMINOPHEN 2 TABLET(S): 5; 325 TABLET ORAL at 15:09

## 2021-08-23 RX ADMIN — Medication 6 MILLIGRAM(S): at 12:30

## 2021-08-23 RX ADMIN — Medication 81 MILLIGRAM(S): at 11:33

## 2021-08-23 NOTE — CONSULT NOTE ADULT - SUBJECTIVE AND OBJECTIVE BOX
Neurology consult    ROHITHMICHELLE [id number]     Patient is a 41y old  Male who presents with a chief complaint of Chest pain (22 Aug 2021 08:29)      HPI:  This is a 42 y/o M with no pmhx presented for R shoulder pain started acutely 6 days ago. The patient was lifting heavy objects in his house before onset of pain. Pain started on the R neck and would radiate downward towards his arms. Denies trauma to the area. Denies weakness to the R arm. Pain is sharp like. Worse with movement when abducting his arms and flexion upward. Pain is constant. Denies numbness or tingling. Physical exam at bedside showed reproducible R shoulder/arm pain when the patient's head is extended backward and R arm raised upward.    He was in the ED 2 days ago for the same complaint, was discharged with pain meds.    He is also an active smoker. Patient is admitted for ACS work up. Currently denies active chest pain. However he does endorse occasional pressure-like chest pain when he is running. Active smoker of 1ppk/day. Denies hx of HLD, HTN or DM. No family hx of CAD or MI. Does not see a cardiologist. EKG at bedside showed Lead II, III and AvF IAN 1 mm, V6 T wave inversions, which was the same from 2 days ago.     Denies hx of surgeries or metal in body. Does not take daily medications. (21 Aug 2021 21:33)      no difficulty with language.  No vision loss or double vision.  No dizziness, vertigo or new hearing loss.  . No difficulty with swallowing.  No focal weakness.  No focal sensory changes.  No numbness or tingling in the bilateral lower extremities.  No difficulty with balance.  No difficulty with ambulation.    REVIEW OF SYSTEMS:    Constitutional: No fever, chills, fatigue, weakness  Eyes: no eye pain, visual disturbances, or discharge  ENT:  No difficulty hearing, tinnitus, vertigo; No sinus or throat pain  Neck: No pain or stiffness  Respiratory: No cough, dyspnea, wheezing   Cardiovascular: No chest pain, palpitations,   Gastrointestinal: No abdominal or epigastric pain. No nausea, vomiting  No diarrhea or constipation.   Genitourinary: No dysuria, frequency, hematuria or incontinence  Neurological: No headaches, lightheadedness, vertigo, numbness or tremors  Psychiatric: No depression, anxiety, mood swings or difficulty sleeping  Musculoskeletal: No joint pain or swelling; No muscle, back or extremity pain  Skin: No itching, burning, rashes or lesions   Lymph Nodes: No enlarged glands  Endocrine: No heat or cold intolerance; No hair loss, No h/o diabetes or thyroid dysfunction  Allergy and Immunologic: No hives or eczema    MEDICATIONS    aspirin  chewable 81 milliGRAM(s) Oral daily  atorvastatin 40 milliGRAM(s) Oral at bedtime  dexAMETHasone  Injectable 6 milliGRAM(s) IV Push once  ketorolac   Injectable 30 milliGRAM(s) IV Push once  oxycodone    5 mG/acetaminophen 325 mG 2 Tablet(s) Oral every 6 hours PRN      PMH: No pertinent past medical history    Family history of hypertension in mother         PSH: No significant past surgical history        Family history: No history of dementia, strokes, or seizures   FAMILY HISTORY:  Family history of diabetes mellitus (DM) (Mother)    FH: HTN (hypertension) (Mother)    FH: rheumatoid arthritis (Mother)        SOCIAL HISTORY:  No history of tobacco or alcohol use     Allergies    No Known Allergies    Intolerances            Vital Signs Last 24 Hrs  T(C): 36.6 (23 Aug 2021 06:02), Max: 36.6 (23 Aug 2021 06:02)  T(F): 97.9 (23 Aug 2021 06:02), Max: 97.9 (23 Aug 2021 06:02)  HR: 78 (23 Aug 2021 06:02) (71 - 78)  BP: 120/83 (23 Aug 2021 06:02) (120/82 - 120/83)  BP(mean): --  RR: 18 (23 Aug 2021 06:02) (18 - 18)  SpO2: 100% (23 Aug 2021 06:02) (100% - 100%)      On Neurological Examination:    Mental Status - Patient is alert, awake, oriented X3. fluent, names, no dysarthria no aphasia Follows commands well and able to answer questions appropriately. Mood and affect  normal    Cranial Nerves - PERRL, EOMI, VFF, V1-V3 intact, no gross facial asymmetry, tongue/uvula midline    Motor Exam -   Right upper 5/5 except tricep 4-/5  Left upper 5/5  Right lower 5/5  Left lower 5/5     nml bulk/tone    Sensory    Intact to light touch and pinprick bilaterally    Coord: FTN intact bilaterally     Gait -  normal      Reflexes:          2+ throughout                                               GENERAL Exam:     Nontoxic , No Acute Distress   	  HEENT:  normocephalic, atraumatic  		  LUNGS:	Clear bilaterally  No Wheeze    	  HEART:	 Normal S1S2   No murmur RRR        	  GI/ ABDOMEN:  Soft  Non tender    EXTREMITIES:   No Edema  No Clubbing  No Cyanosis No Edema    MUSCULOSKELETAL: Normal Range of Motion  	   SKIN:      Normal   No Ecchymosis               LABS:  CBC Full  -  ( 23 Aug 2021 08:19 )  WBC Count : 10.98 K/uL  RBC Count : 5.85 M/uL  Hemoglobin : 16.9 g/dL  Hematocrit : 51.2 %  Platelet Count - Automated : 319 K/uL  Mean Cell Volume : 87.5 fL  Mean Cell Hemoglobin : 28.9 pg  Mean Cell Hemoglobin Concentration : 33.0 gm/dL  Auto Neutrophil # : x  Auto Lymphocyte # : x  Auto Monocyte # : x  Auto Eosinophil # : x  Auto Basophil # : x  Auto Neutrophil % : x  Auto Lymphocyte % : x  Auto Monocyte % : x  Auto Eosinophil % : x  Auto Basophil % : x      08-23    135  |  99  |  11  ----------------------------<  103<H>  4.1   |  21<L>  |  0.94    Ca    9.5      23 Aug 2021 08:19  Phos  4.0     08-23  Mg     2.30     08-23    TPro  6.6  /  Alb  4.1  /  TBili  0.3  /  DBili  x   /  AST  19  /  ALT  13  /  AlkPhos  74  08-22    LIVER FUNCTIONS - ( 22 Aug 2021 05:35 )  Alb: 4.1 g/dL / Pro: 6.6 g/dL / ALK PHOS: 74 U/L / ALT: 13 U/L / AST: 19 U/L / GGT: x           Hemoglobin A1C:       PT/INR - ( 22 Aug 2021 05:35 )   PT: 11.5 sec;   INR: 1.01 ratio         PTT - ( 22 Aug 2021 05:35 )  PTT:34.9 sec      RADIOLOGY

## 2021-08-23 NOTE — CONSULT NOTE ADULT - ASSESSMENT
40 y/o M with no pmhx presents wit R neck pain radiating downward towards his arm. On exam R triceps weakness, cervical paraspinal tenderness      Impression: cervical radiculopathy    MRI C-spine here or as an outpt  Toradol 30 mg IV x 1 followed by 15 mg q8 PRN, can DC on Meloxicam 15 mg x 10 days  Decadron 6 mg IV x1 followed Decadron IV or PO 4 mg BID x 2 days, 3 mg BID x 2 days, 2 mg BID x 2 days, 2 mg daily x 2 days  Can follow up with Neurology, Dr. Kvng Murphy at [phone] for outpt EMG/NCS

## 2021-08-23 NOTE — SBIRT NOTE ADULT - NSSBIRTALCNOACTINTDET_GEN_A_CORE
Provided SBIRT services: Full screen Negative. No additional needs in this domain.  [facility] information: [address] [place] [address] , [place] [address] p: [phone]

## 2021-08-24 LAB
ANION GAP SERPL CALC-SCNC: 19 MMOL/L — HIGH (ref 7–14)
BUN SERPL-MCNC: 18 MG/DL — SIGNIFICANT CHANGE UP (ref 7–23)
CALCIUM SERPL-MCNC: 10.3 MG/DL — SIGNIFICANT CHANGE UP (ref 8.4–10.5)
CHLORIDE SERPL-SCNC: 100 MMOL/L — SIGNIFICANT CHANGE UP (ref 98–107)
CO2 SERPL-SCNC: 21 MMOL/L — LOW (ref 22–31)
CREAT SERPL-MCNC: 0.95 MG/DL — SIGNIFICANT CHANGE UP (ref 0.5–1.3)
GLUCOSE SERPL-MCNC: 134 MG/DL — HIGH (ref 70–99)
HCT VFR BLD CALC: 50.7 % — HIGH (ref 39–50)
HGB BLD-MCNC: 17.1 G/DL — HIGH (ref 13–17)
MAGNESIUM SERPL-MCNC: 2.3 MG/DL — SIGNIFICANT CHANGE UP (ref 1.6–2.6)
MCHC RBC-ENTMCNC: 28.8 PG — SIGNIFICANT CHANGE UP (ref 27–34)
MCHC RBC-ENTMCNC: 33.7 GM/DL — SIGNIFICANT CHANGE UP (ref 32–36)
MCV RBC AUTO: 85.4 FL — SIGNIFICANT CHANGE UP (ref 80–100)
NRBC # BLD: 0 /100 WBCS — SIGNIFICANT CHANGE UP
NRBC # FLD: 0 K/UL — SIGNIFICANT CHANGE UP
PHOSPHATE SERPL-MCNC: 3.1 MG/DL — SIGNIFICANT CHANGE UP (ref 2.5–4.5)
PLATELET # BLD AUTO: 362 K/UL — SIGNIFICANT CHANGE UP (ref 150–400)
POTASSIUM SERPL-MCNC: 4.9 MMOL/L — SIGNIFICANT CHANGE UP (ref 3.5–5.3)
POTASSIUM SERPL-SCNC: 4.9 MMOL/L — SIGNIFICANT CHANGE UP (ref 3.5–5.3)
RBC # BLD: 5.94 M/UL — HIGH (ref 4.2–5.8)
RBC # FLD: 13.4 % — SIGNIFICANT CHANGE UP (ref 10.3–14.5)
SODIUM SERPL-SCNC: 140 MMOL/L — SIGNIFICANT CHANGE UP (ref 135–145)
WBC # BLD: 20.2 K/UL — HIGH (ref 3.8–10.5)
WBC # FLD AUTO: 20.2 K/UL — HIGH (ref 3.8–10.5)

## 2021-08-24 PROCEDURE — 93306 TTE W/DOPPLER COMPLETE: CPT | Mod: 26

## 2021-08-24 PROCEDURE — 93454 CORONARY ARTERY ANGIO S&I: CPT | Mod: 26

## 2021-08-24 PROCEDURE — 99152 MOD SED SAME PHYS/QHP 5/>YRS: CPT

## 2021-08-24 RX ADMIN — Medication 15 MILLIGRAM(S): at 08:15

## 2021-08-24 RX ADMIN — Medication 4 MILLIGRAM(S): at 05:33

## 2021-08-24 RX ADMIN — Medication 4 MILLIGRAM(S): at 18:37

## 2021-08-24 RX ADMIN — Medication 81 MILLIGRAM(S): at 11:40

## 2021-08-24 RX ADMIN — ATORVASTATIN CALCIUM 40 MILLIGRAM(S): 80 TABLET, FILM COATED ORAL at 21:44

## 2021-08-24 RX ADMIN — Medication 15 MILLIGRAM(S): at 08:30

## 2021-08-25 ENCOUNTER — TRANSCRIPTION ENCOUNTER (OUTPATIENT)
Age: 41
End: 2021-08-25

## 2021-08-25 LAB
ANION GAP SERPL CALC-SCNC: 15 MMOL/L — HIGH (ref 7–14)
BUN SERPL-MCNC: 17 MG/DL — SIGNIFICANT CHANGE UP (ref 7–23)
CALCIUM SERPL-MCNC: 9.5 MG/DL — SIGNIFICANT CHANGE UP (ref 8.4–10.5)
CHLORIDE SERPL-SCNC: 103 MMOL/L — SIGNIFICANT CHANGE UP (ref 98–107)
CO2 SERPL-SCNC: 21 MMOL/L — LOW (ref 22–31)
CREAT SERPL-MCNC: 0.89 MG/DL — SIGNIFICANT CHANGE UP (ref 0.5–1.3)
GLUCOSE SERPL-MCNC: 133 MG/DL — HIGH (ref 70–99)
HCT VFR BLD CALC: 49.4 % — SIGNIFICANT CHANGE UP (ref 39–50)
HGB BLD-MCNC: 16.5 G/DL — SIGNIFICANT CHANGE UP (ref 13–17)
MAGNESIUM SERPL-MCNC: 2.3 MG/DL — SIGNIFICANT CHANGE UP (ref 1.6–2.6)
MCHC RBC-ENTMCNC: 29 PG — SIGNIFICANT CHANGE UP (ref 27–34)
MCHC RBC-ENTMCNC: 33.4 GM/DL — SIGNIFICANT CHANGE UP (ref 32–36)
MCV RBC AUTO: 87 FL — SIGNIFICANT CHANGE UP (ref 80–100)
NRBC # BLD: 0 /100 WBCS — SIGNIFICANT CHANGE UP
NRBC # FLD: 0 K/UL — SIGNIFICANT CHANGE UP
PHOSPHATE SERPL-MCNC: 3.4 MG/DL — SIGNIFICANT CHANGE UP (ref 2.5–4.5)
PLATELET # BLD AUTO: 355 K/UL — SIGNIFICANT CHANGE UP (ref 150–400)
POTASSIUM SERPL-MCNC: 4.5 MMOL/L — SIGNIFICANT CHANGE UP (ref 3.5–5.3)
POTASSIUM SERPL-SCNC: 4.5 MMOL/L — SIGNIFICANT CHANGE UP (ref 3.5–5.3)
RBC # BLD: 5.68 M/UL — SIGNIFICANT CHANGE UP (ref 4.2–5.8)
RBC # FLD: 14 % — SIGNIFICANT CHANGE UP (ref 10.3–14.5)
SODIUM SERPL-SCNC: 139 MMOL/L — SIGNIFICANT CHANGE UP (ref 135–145)
WBC # BLD: 21.27 K/UL — HIGH (ref 3.8–10.5)
WBC # FLD AUTO: 21.27 K/UL — HIGH (ref 3.8–10.5)

## 2021-08-25 RX ORDER — ASPIRIN/CALCIUM CARB/MAGNESIUM 324 MG
1 TABLET ORAL
Qty: 0 | Refills: 0 | DISCHARGE
Start: 2021-08-25

## 2021-08-25 RX ORDER — LIDOCAINE 4 G/100G
1 CREAM TOPICAL DAILY
Refills: 0 | Status: DISCONTINUED | OUTPATIENT
Start: 2021-08-25 | End: 2021-08-30

## 2021-08-25 RX ORDER — LIDOCAINE 4 G/100G
1 CREAM TOPICAL DAILY
Refills: 0 | Status: DISCONTINUED | OUTPATIENT
Start: 2021-08-25 | End: 2021-08-25

## 2021-08-25 RX ORDER — KETOROLAC TROMETHAMINE 30 MG/ML
15 SYRINGE (ML) INJECTION
Qty: 0 | Refills: 0 | DISCHARGE
Start: 2021-08-25

## 2021-08-25 RX ORDER — LIDOCAINE 4 G/100G
1 CREAM TOPICAL
Qty: 0 | Refills: 0 | DISCHARGE
Start: 2021-08-25

## 2021-08-25 RX ORDER — ATORVASTATIN CALCIUM 80 MG/1
1 TABLET, FILM COATED ORAL
Qty: 0 | Refills: 0 | DISCHARGE
Start: 2021-08-25

## 2021-08-25 RX ORDER — ALPRAZOLAM 0.25 MG
0.25 TABLET ORAL ONCE
Refills: 0 | Status: DISCONTINUED | OUTPATIENT
Start: 2021-08-25 | End: 2021-08-29

## 2021-08-25 RX ADMIN — OXYCODONE AND ACETAMINOPHEN 2 TABLET(S): 5; 325 TABLET ORAL at 22:41

## 2021-08-25 RX ADMIN — ATORVASTATIN CALCIUM 40 MILLIGRAM(S): 80 TABLET, FILM COATED ORAL at 21:42

## 2021-08-25 RX ADMIN — OXYCODONE AND ACETAMINOPHEN 2 TABLET(S): 5; 325 TABLET ORAL at 11:33

## 2021-08-25 RX ADMIN — Medication 15 MILLIGRAM(S): at 15:42

## 2021-08-25 RX ADMIN — Medication 4 MILLIGRAM(S): at 06:33

## 2021-08-25 RX ADMIN — OXYCODONE AND ACETAMINOPHEN 2 TABLET(S): 5; 325 TABLET ORAL at 21:41

## 2021-08-25 RX ADMIN — Medication 15 MILLIGRAM(S): at 15:28

## 2021-08-25 RX ADMIN — Medication 81 MILLIGRAM(S): at 11:33

## 2021-08-25 RX ADMIN — OXYCODONE AND ACETAMINOPHEN 2 TABLET(S): 5; 325 TABLET ORAL at 12:30

## 2021-08-25 RX ADMIN — Medication 4 MILLIGRAM(S): at 18:19

## 2021-08-25 NOTE — CHART NOTE - NSCHARTNOTEFT_GEN_A_CORE
Patient s/p cardiac cath, seen and examined at bedside, comfortable NAD, has no complaints,  Rt wrist appears clean, dry, intact, no evidence of active bleeding, no hematoma, + pulses, good capillary refill. Continue to monitor.

## 2021-08-25 NOTE — DISCHARGE NOTE PROVIDER - HOSPITAL COURSE
41M with no significant PMH presents with R arm pain that radiates to neck x 5 days. Patient underwent cardiac catheterization showing triple vessel disease. LHC: pLAD 80-90%. mLAD 100%. OM1 100%. mRCA 70-80%. RRA accessed, stable. Will be transferred to [facility] for CABG evaluation. ASA and Lipitor started. Neurology following for cervical radiculopathy. On exam R triceps weakness, cervical paraspinal tenderness. Recommend MRI C-spine inpatient or outpatient. Patient unable to tolerate MRI on 8/24 due to claustrophobia, can try premedicating with Xanax. Neurology recommends Toradol 15 mg q8 PRN, can DC on Meloxicam 15 mg x 10 days  Continue with Decadron taper  Can follow up with Neurology, Dr. Kvng Murphy at [phone] for outpt EMG/NCS     41M with no significant PMH presents with R arm pain that radiates to neck x 5 days. Patient underwent cardiac catheterization showing triple vessel disease. LHC: pLAD 80-90%. mLAD 100%. OM1 100%. mRCA 70-80%. RRA accessed, stable. Will be transferred to [facility] for CABG evaluation. ASA and Lipitor started. Neurology following for cervical radiculopathy. On exam R triceps weakness, cervical paraspinal tenderness. Recommend MRI C-spine inpatient or outpatient. Patient unable to tolerate MRI on 8/24 due to claustrophobia, can try premedicating with Xanax. Neurology started Decadron taper and recommends Toradol 15 mg q8 PRN, can discharge on Meloxicam 15 mg x 10 days. Can follow up with Neurology, Dr. Kvng Murphy at [phone] for outpt EMG/NCS.  Patient being transferred to [facility] for CABG evaluation.     41M with no significant PMH presents with R arm pain that radiates to neck x 5 days. Patient underwent cardiac catheterization showing triple vessel disease. LHC: pLAD 80-90%. mLAD 100%. OM1 100%. mRCA 70-80%. RRA accessed, stable. Will be transferred to [facility] for CABG evaluation. ASA and Lipitor started. Neurology following for cervical radiculopathy. On exam R triceps weakness, cervical paraspinal tenderness. MRI noted degenerative disease. Neurology started Decadron taper and recommends Toradol 15 mg q8 PRN, can discharge on Meloxicam 15 mg x 10 days. Can follow up with Neurology, Dr. Kvng Murphy at [phone] for outpt EMG/NCS.  Patient being transferred to [facility] for CABG evaluation.

## 2021-08-25 NOTE — DISCHARGE NOTE PROVIDER - NSDCCPCAREPLAN_GEN_ALL_CORE_FT
PRINCIPAL DISCHARGE DIAGNOSIS  Diagnosis: Angina pectoris  Assessment and Plan of Treatment: You came to the hospital complaining of right arm pain that radiates to neck. You underwent cardiac catheterization (angiogram) which showed triple vessel disease, meaning you have blockages in multiple vessels in your heart. You were started on Aspirin and Lipitor. You are being transferred to [facility] for CABG evaluation (Coronary Artery Bypass Graft).      SECONDARY DISCHARGE DIAGNOSES  Diagnosis: Neck pain on right side  Assessment and Plan of Treatment: You were seen by Neurology who recommends an MRI Cervical spine inpatient or outpatient. Neurology started Decadron taper and recommends Toradol 15 mg every 8 hours as needed while in the hospital and you can be discharged on Meloxicam 15 mg for 10 days. You may follow up with Neurology, Dr. Kvng Murphy at [phone] for outpt EMG/NCS.     PRINCIPAL DISCHARGE DIAGNOSIS  Diagnosis: Angina pectoris  Assessment and Plan of Treatment: You came to the hospital complaining of right arm pain that radiates to neck. You underwent cardiac catheterization (angiogram) which showed triple vessel disease, meaning you have blockages in multiple vessels in your heart. You were started on Aspirin and Lipitor. You are being transferred to [facility] for CABG evaluation (Coronary Artery Bypass Graft).      SECONDARY DISCHARGE DIAGNOSES  Diagnosis: Neck pain on right side  Assessment and Plan of Treatment: You were seen by Neurology who recommends an MRI Cervical spine inpatient or outpatient. Neurology started Decadron taper and recommends Toradol 15 mg every 8 hours as needed while in the hospital and you can be discharged on Meloxicam 15 mg for 7 days. You may follow up with Neurology, Dr. Kvng Murphy at [phone] for outpt EMG/NCS.     PRINCIPAL DISCHARGE DIAGNOSIS  Diagnosis: Angina pectoris  Assessment and Plan of Treatment: You came to the hospital complaining of right arm pain that radiates to neck. You underwent cardiac catheterization (angiogram) which showed triple vessel disease, meaning you have blockages in multiple vessels in your heart. You were started on Aspirin and Lipitor. You are being transferred to [facility] for CABG evaluation (Coronary Artery Bypass Graft).      SECONDARY DISCHARGE DIAGNOSES  Diagnosis: Neck pain on right side  Assessment and Plan of Treatment: You were seen by Neurology, MRI with degenerative change. Neurology started Decadron taper to end 9/1 and recommends Toradol 15 mg every 8 hours as needed while in the hospital and you can be discharged on Meloxicam 15 mg for 7 days. You may follow up with Neurology, Dr. Kvng Murphy at [phone] for outpt EMG/NCS.

## 2021-08-25 NOTE — PATIENT PROFILE ADULT - MONEY FOR FOOD
[FreeTextEntry1] : EKG demonstrating normal sinus rhythm at a rate of 61 with some poor R wave progression V1 to V3 but otherwise no acute changes\par \par In summary the patient is an 85-year-old woman with a history of mild hypertension, chronic lymphedema of the lower extremities and recent general weakness and diaphoresis likely secondary to hyponatremia aggravated by increased H2O intake and diuretic therapy;\par \par \par \par Plan:\par \par Patient recommended to decrease HCTZ to 12.5 mg daily;\par \par Recommend decrease frequency of blood pressure checks at home as to not make patient to anxious\par \par Patient reassured;\par \par Reduce a little bit her p.o. intake of H2O (was drinking almost gallons of water);\par \par Followup this office within 3 months or p.r.n.\par \par Serum electrolytes with primary care in the near future recommended;;;;;
no

## 2021-08-25 NOTE — DISCHARGE NOTE PROVIDER - NSDCMRMEDTOKEN_GEN_ALL_CORE_FT
aspirin 81 mg oral tablet, chewable: 1 tab(s) orally once a day  atorvastatin 40 mg oral tablet: 1 tab(s) orally once a day (at bedtime)  ketorolac: 15 milligram(s) intravenously every 8 hours, As Needed for severe pain  lidocaine 5% topical film: Apply topically to affected area once a day  oxycodone-acetaminophen 5 mg-325 mg oral tablet: 2 tab(s) orally every 6 hours, As needed, Moderate Pain (4 - 6)   aspirin 81 mg oral tablet, chewable: 1 tab(s) orally once a day  atorvastatin 40 mg oral tablet: 1 tab(s) orally once a day (at bedtime)  dexamethasone: 2 milligram(s) orally once a day stop on 9/1  ketorolac: 15 milligram(s) intravenous every 8 hours, As Needed  ketorolac: 15 milligram(s) intravenously every 8 hours, As Needed for severe pain  lidocaine 5% topical film: Apply topically to affected area once a day  oxycodone-acetaminophen 5 mg-325 mg oral tablet: 2 tab(s) orally every 6 hours, As needed, Moderate Pain (4 - 6)  senna oral tablet: 2 tab(s) orally once a day (at bedtime)

## 2021-08-25 NOTE — DISCHARGE NOTE PROVIDER - CARE PROVIDER_API CALL
[facility])  Cardiology  [address] [place] [address]  Phone: [phone]  Fax: [phone]  Follow Up Time:     Kvng Murphy)  Neurology  [address] [facility] [address] [facility], [address]  [place] [address]  Phone: [phone]  Fax: [phone]  Follow Up Time:

## 2021-08-26 LAB
ANION GAP SERPL CALC-SCNC: 16 MMOL/L — HIGH (ref 7–14)
BUN SERPL-MCNC: 18 MG/DL — SIGNIFICANT CHANGE UP (ref 7–23)
CALCIUM SERPL-MCNC: 9.7 MG/DL — SIGNIFICANT CHANGE UP (ref 8.4–10.5)
CHLORIDE SERPL-SCNC: 102 MMOL/L — SIGNIFICANT CHANGE UP (ref 98–107)
CO2 SERPL-SCNC: 22 MMOL/L — SIGNIFICANT CHANGE UP (ref 22–31)
CREAT SERPL-MCNC: 0.95 MG/DL — SIGNIFICANT CHANGE UP (ref 0.5–1.3)
GLUCOSE SERPL-MCNC: 125 MG/DL — HIGH (ref 70–99)
HCT VFR BLD CALC: 53.9 % — HIGH (ref 39–50)
HGB BLD-MCNC: 17.7 G/DL — HIGH (ref 13–17)
MAGNESIUM SERPL-MCNC: 2.3 MG/DL — SIGNIFICANT CHANGE UP (ref 1.6–2.6)
MCHC RBC-ENTMCNC: 28.9 PG — SIGNIFICANT CHANGE UP (ref 27–34)
MCHC RBC-ENTMCNC: 32.8 GM/DL — SIGNIFICANT CHANGE UP (ref 32–36)
MCV RBC AUTO: 88.1 FL — SIGNIFICANT CHANGE UP (ref 80–100)
NRBC # BLD: 0 /100 WBCS — SIGNIFICANT CHANGE UP
NRBC # FLD: 0 K/UL — SIGNIFICANT CHANGE UP
PHOSPHATE SERPL-MCNC: 3.9 MG/DL — SIGNIFICANT CHANGE UP (ref 2.5–4.5)
PLATELET # BLD AUTO: 373 K/UL — SIGNIFICANT CHANGE UP (ref 150–400)
POTASSIUM SERPL-MCNC: 4.6 MMOL/L — SIGNIFICANT CHANGE UP (ref 3.5–5.3)
POTASSIUM SERPL-SCNC: 4.6 MMOL/L — SIGNIFICANT CHANGE UP (ref 3.5–5.3)
RBC # BLD: 6.12 M/UL — HIGH (ref 4.2–5.8)
RBC # FLD: 14 % — SIGNIFICANT CHANGE UP (ref 10.3–14.5)
SODIUM SERPL-SCNC: 140 MMOL/L — SIGNIFICANT CHANGE UP (ref 135–145)
WBC # BLD: 23.34 K/UL — HIGH (ref 3.8–10.5)
WBC # FLD AUTO: 23.34 K/UL — HIGH (ref 3.8–10.5)

## 2021-08-26 RX ADMIN — OXYCODONE AND ACETAMINOPHEN 2 TABLET(S): 5; 325 TABLET ORAL at 21:51

## 2021-08-26 RX ADMIN — LIDOCAINE 1 PATCH: 4 CREAM TOPICAL at 19:24

## 2021-08-26 RX ADMIN — Medication 3 MILLIGRAM(S): at 18:12

## 2021-08-26 RX ADMIN — Medication 3 MILLIGRAM(S): at 05:51

## 2021-08-26 RX ADMIN — LIDOCAINE 1 PATCH: 4 CREAM TOPICAL at 12:39

## 2021-08-26 RX ADMIN — ATORVASTATIN CALCIUM 40 MILLIGRAM(S): 80 TABLET, FILM COATED ORAL at 21:51

## 2021-08-26 RX ADMIN — OXYCODONE AND ACETAMINOPHEN 2 TABLET(S): 5; 325 TABLET ORAL at 15:45

## 2021-08-26 RX ADMIN — Medication 81 MILLIGRAM(S): at 12:39

## 2021-08-26 RX ADMIN — OXYCODONE AND ACETAMINOPHEN 2 TABLET(S): 5; 325 TABLET ORAL at 08:43

## 2021-08-27 LAB
ANION GAP SERPL CALC-SCNC: 14 MMOL/L — SIGNIFICANT CHANGE UP (ref 7–14)
BUN SERPL-MCNC: 20 MG/DL — SIGNIFICANT CHANGE UP (ref 7–23)
CALCIUM SERPL-MCNC: 9.7 MG/DL — SIGNIFICANT CHANGE UP (ref 8.4–10.5)
CHLORIDE SERPL-SCNC: 97 MMOL/L — LOW (ref 98–107)
CO2 SERPL-SCNC: 25 MMOL/L — SIGNIFICANT CHANGE UP (ref 22–31)
CREAT SERPL-MCNC: 0.92 MG/DL — SIGNIFICANT CHANGE UP (ref 0.5–1.3)
GLUCOSE SERPL-MCNC: 97 MG/DL — SIGNIFICANT CHANGE UP (ref 70–99)
HCT VFR BLD CALC: 52.6 % — HIGH (ref 39–50)
HGB BLD-MCNC: 17.2 G/DL — HIGH (ref 13–17)
MAGNESIUM SERPL-MCNC: 2.3 MG/DL — SIGNIFICANT CHANGE UP (ref 1.6–2.6)
MCHC RBC-ENTMCNC: 29 PG — SIGNIFICANT CHANGE UP (ref 27–34)
MCHC RBC-ENTMCNC: 32.7 GM/DL — SIGNIFICANT CHANGE UP (ref 32–36)
MCV RBC AUTO: 88.6 FL — SIGNIFICANT CHANGE UP (ref 80–100)
NRBC # BLD: 0 /100 WBCS — SIGNIFICANT CHANGE UP
NRBC # FLD: 0 K/UL — SIGNIFICANT CHANGE UP
PHOSPHATE SERPL-MCNC: 3.9 MG/DL — SIGNIFICANT CHANGE UP (ref 2.5–4.5)
PLATELET # BLD AUTO: 374 K/UL — SIGNIFICANT CHANGE UP (ref 150–400)
POTASSIUM SERPL-MCNC: 4.1 MMOL/L — SIGNIFICANT CHANGE UP (ref 3.5–5.3)
POTASSIUM SERPL-SCNC: 4.1 MMOL/L — SIGNIFICANT CHANGE UP (ref 3.5–5.3)
RBC # BLD: 5.94 M/UL — HIGH (ref 4.2–5.8)
RBC # FLD: 13.9 % — SIGNIFICANT CHANGE UP (ref 10.3–14.5)
SODIUM SERPL-SCNC: 136 MMOL/L — SIGNIFICANT CHANGE UP (ref 135–145)
WBC # BLD: 20.48 K/UL — HIGH (ref 3.8–10.5)
WBC # FLD AUTO: 20.48 K/UL — HIGH (ref 3.8–10.5)

## 2021-08-27 RX ADMIN — LIDOCAINE 1 PATCH: 4 CREAM TOPICAL at 11:43

## 2021-08-27 RX ADMIN — ATORVASTATIN CALCIUM 40 MILLIGRAM(S): 80 TABLET, FILM COATED ORAL at 22:16

## 2021-08-27 RX ADMIN — Medication 3 MILLIGRAM(S): at 18:03

## 2021-08-27 RX ADMIN — LIDOCAINE 1 PATCH: 4 CREAM TOPICAL at 23:13

## 2021-08-27 RX ADMIN — Medication 3 MILLIGRAM(S): at 05:39

## 2021-08-27 RX ADMIN — OXYCODONE AND ACETAMINOPHEN 2 TABLET(S): 5; 325 TABLET ORAL at 09:53

## 2021-08-27 RX ADMIN — Medication 81 MILLIGRAM(S): at 11:44

## 2021-08-27 RX ADMIN — OXYCODONE AND ACETAMINOPHEN 2 TABLET(S): 5; 325 TABLET ORAL at 10:23

## 2021-08-28 LAB
ANION GAP SERPL CALC-SCNC: 14 MMOL/L — SIGNIFICANT CHANGE UP (ref 7–14)
BUN SERPL-MCNC: 24 MG/DL — HIGH (ref 7–23)
CALCIUM SERPL-MCNC: 9.6 MG/DL — SIGNIFICANT CHANGE UP (ref 8.4–10.5)
CHLORIDE SERPL-SCNC: 98 MMOL/L — SIGNIFICANT CHANGE UP (ref 98–107)
CO2 SERPL-SCNC: 26 MMOL/L — SIGNIFICANT CHANGE UP (ref 22–31)
CREAT SERPL-MCNC: 1.02 MG/DL — SIGNIFICANT CHANGE UP (ref 0.5–1.3)
GLUCOSE SERPL-MCNC: 122 MG/DL — HIGH (ref 70–99)
HCT VFR BLD CALC: 50.8 % — HIGH (ref 39–50)
HGB BLD-MCNC: 17 G/DL — SIGNIFICANT CHANGE UP (ref 13–17)
MAGNESIUM SERPL-MCNC: 2.4 MG/DL — SIGNIFICANT CHANGE UP (ref 1.6–2.6)
MCHC RBC-ENTMCNC: 29.4 PG — SIGNIFICANT CHANGE UP (ref 27–34)
MCHC RBC-ENTMCNC: 33.5 GM/DL — SIGNIFICANT CHANGE UP (ref 32–36)
MCV RBC AUTO: 87.7 FL — SIGNIFICANT CHANGE UP (ref 80–100)
NRBC # BLD: 0 /100 WBCS — SIGNIFICANT CHANGE UP
NRBC # FLD: 0 K/UL — SIGNIFICANT CHANGE UP
PHOSPHATE SERPL-MCNC: 3.7 MG/DL — SIGNIFICANT CHANGE UP (ref 2.5–4.5)
PLATELET # BLD AUTO: 361 K/UL — SIGNIFICANT CHANGE UP (ref 150–400)
POTASSIUM SERPL-MCNC: 4.5 MMOL/L — SIGNIFICANT CHANGE UP (ref 3.5–5.3)
POTASSIUM SERPL-SCNC: 4.5 MMOL/L — SIGNIFICANT CHANGE UP (ref 3.5–5.3)
RBC # BLD: 5.79 M/UL — SIGNIFICANT CHANGE UP (ref 4.2–5.8)
RBC # FLD: 13.6 % — SIGNIFICANT CHANGE UP (ref 10.3–14.5)
SODIUM SERPL-SCNC: 138 MMOL/L — SIGNIFICANT CHANGE UP (ref 135–145)
WBC # BLD: 19.53 K/UL — HIGH (ref 3.8–10.5)
WBC # FLD AUTO: 19.53 K/UL — HIGH (ref 3.8–10.5)

## 2021-08-28 RX ADMIN — Medication 81 MILLIGRAM(S): at 11:22

## 2021-08-28 RX ADMIN — LIDOCAINE 1 PATCH: 4 CREAM TOPICAL at 11:22

## 2021-08-28 RX ADMIN — Medication 15 MILLIGRAM(S): at 11:23

## 2021-08-28 RX ADMIN — LIDOCAINE 1 PATCH: 4 CREAM TOPICAL at 18:00

## 2021-08-28 RX ADMIN — Medication 2 MILLIGRAM(S): at 06:13

## 2021-08-28 RX ADMIN — ATORVASTATIN CALCIUM 40 MILLIGRAM(S): 80 TABLET, FILM COATED ORAL at 21:47

## 2021-08-28 RX ADMIN — Medication 2 MILLIGRAM(S): at 17:57

## 2021-08-28 NOTE — PROGRESS NOTE ADULT - PROBLEM SELECTOR PROBLEM 2
Neck pain on right side

## 2021-08-28 NOTE — PROGRESS NOTE ADULT - PROBLEM SELECTOR PLAN 3
Assessment:  - hx of smoking 1ppk/day
Assessment:  - hx of smoking 1ppk/day    Plan:  - consider chantix at discharge

## 2021-08-28 NOTE — PROGRESS NOTE ADULT - ASSESSMENT
42 y/o M with no pmhx presents wit R neck pain radiating downward towards his arm. On exam R triceps weakness, cervical paraspinal tenderness      Impression: cervical radiculopathy    MRI C-spine here or as an outpt  Toradol 15 mg q8 PRN, can DC on Meloxicam 15 mg x 10 days  Continue with Decadron taper  Can follow up with Neurology, Dr. Kvng Murphy at [phone] for outpt EMG/NCS
 42 y/o M with no pmhx presents wit R neck pain radiating downward towards his arm. On exam R triceps weakness, cervical paraspinal tenderness      Impression: cervical radiculopathy    MRI C-spine here or as an outpt  Toradol 15 mg q8 PRN, can DC on Meloxicam 15 mg x 10 days  Continue with Decadron taper  Can follow up with Neurology, Dr. Kvng Murphy at [phone] for outpt EMG/NCS
 40 y/o M with no pmhx presents wit R neck pain radiating downward towards his arm. On exam R triceps weakness, cervical paraspinal tenderness      Impression: cervical radiculopathy    MRI C-spine here or as an outpt (can do Open MRI as outpt if does not tolerate MRI here)  Toradol 15 mg q8 PRN, can DC on Meloxicam 15 mg x 7 days  Continue with Decadron taper  Can follow up with Neurology, Dr. Kvng Murphy at [phone] for outpt EMG/NCS
This is a 42 y/o M with no pmhx presented for R shoulder pain started acutely 6 days ago. Incidentally found to have mild ST changes. Trops negative. Admitted for ACS work up and evaluation of R shoulder pain.
This is a 40 y/o M with no pmhx presented for R shoulder pain started acutely 6 days ago. Incidentally found to have mild ST changes. Trops negative. Admitted for ACS work up and evaluation of R shoulder pain.
This is a 40 y/o M with no pmhx presented for R shoulder pain started acutely 6 days ago. Incidentally found to have mild ST changes. Trops negative. Admitted for ACS work up and evaluation of R shoulder pain.
This is a 42 y/o M with no pmhx presented for R shoulder pain started acutely 6 days ago. Incidentally found to have mild ST changes. Trops negative. Admitted for ACS work up and evaluation of R shoulder pain.

## 2021-08-28 NOTE — PROGRESS NOTE ADULT - PROBLEM SELECTOR PLAN 2
neuro eval reviewed  decadron+pain control   MRI
neuro eval   decadron+pain control   MRI
neuro eval reviewed  decadron+pain control   MRI

## 2021-08-28 NOTE — PROGRESS NOTE ADULT - SUBJECTIVE AND OBJECTIVE BOX
SUBJECTIVE / OVERNIGHT EVENTS:pt seen and examined  c/o rt upper ext pain     MEDICATIONS  (STANDING):  ALPRAZolam 0.25 milliGRAM(s) Oral once  aspirin  chewable 81 milliGRAM(s) Oral daily  atorvastatin 40 milliGRAM(s) Oral at bedtime  dexAMETHasone     Tablet 3 milliGRAM(s) Oral two times a day  lidocaine   5% Patch 1 Patch Transdermal daily    MEDICATIONS  (PRN):  ketorolac   Injectable 15 milliGRAM(s) IV Push every 8 hours PRN Severe Pain (7 - 10)  oxycodone    5 mG/acetaminophen 325 mG 2 Tablet(s) Oral every 6 hours PRN Moderate Pain (4 - 6)    Vital Signs Last 24 Hrs  T(C): 36.8 (26 Aug 2021 18:33), Max: 36.8 (26 Aug 2021 18:33)  T(F): 98.2 (26 Aug 2021 18:33), Max: 98.2 (26 Aug 2021 18:33)  HR: 73 (26 Aug 2021 18:33) (71 - 75)  BP: 118/81 (26 Aug 2021 18:33) (110/75 - 118/81)  BP(mean): 85 (26 Aug 2021 06:00) (85 - 85)  RR: 18 (26 Aug 2021 18:33) (18 - 19)  SpO2: 98% (26 Aug 2021 18:33) (98% - 100%)  Constitutional: No fever, fatigue  Skin: No rash.  Eyes: No recent vision problems or eye pain.  ENT: No congestion, ear pain, or sore throat.  Cardiovascular: No chest pain or palpation.  Respiratory: No cough, shortness of breath, congestion, or wheezing.  Gastrointestinal: No abdominal pain, nausea, vomiting, or diarrhea.  Genitourinary: No dysuria.  Musculoskeletal: No joint swelling.rt upper ext pain   Neurologic: No headache.    PHYSICAL EXAM:  GENERAL: NAD  EYES: EOMI, PERRLA  NECK: Supple, No JVD  CHEST/LUNG: cta gege  HEART:  S1 , S2 +  ABDOMEN: soft , bs+  EXTREMITIES:  no edema, dec rom of rt upper ext   NEUROLOGY:alert awake oriented     LABS:  08-26    140  |  102  |  18  ----------------------------<  125<H>  4.6   |  22  |  0.95    Ca    9.7      26 Aug 2021 08:19  Phos  3.9     08-26  Mg     2.30     08-26      Creatinine Trend: 0.95 <--, 0.89 <--, 0.95 <--, 0.94 <--, 0.90 <--, 1.07 <--                        17.7   23.34 )-----------( 373      ( 26 Aug 2021 08:19 )             53.9     Urine Studies:                Care Discussed with Consultants/Other Providers:  
DATE OF SERVICE: 08/22/2021   Patient was seen and examined on  08/22/2021   .Interim events noted.Consultant notes ,Labs,Telemetry reviewed by me    PRESENTING CC:Chest pain    HPI and HOSPITAL COURSE: HPI:  This is a 42 y/o M with no pmhx presented for R shoulder pain started acutely 6 days ago. The patient was lifting heavy objects in his house before onset of pain. Pain started on the R neck and would radiate downward towards his arms. Denies trauma to the area. Denies weakness to the R arm. Pain is sharp like. Worse with movement when abducting his arms and flexion upward. Pain is constant. Denies numbness or tingling. Physical exam at bedside showed reproducible R shoulder/arm pain when the patient's head is extended backward and R arm raised upward.    He was in the ED 2 days ago for the same complaint, was discharged with pain meds.    He is also an active smoker. Patient is admitted for ACS work up. Currently denies active chest pain. However he does endorse occasional pressure-like chest pain when he is running. Active smoker of 1ppk/day. Denies hx of HLD, HTN or DM. No family hx of CAD or MI. Does not see a cardiologist. EKG at bedside showed Lead II, III and AvF IAN 1 mm, V6 T wave inversions, which was the same from 2 days ago.     Denies hx of surgeries or metal in body. Does not take daily medications. (21 Aug 2021 21:33)      INTERIM EVENTS:Awake alert shoulder pain is less      PMH -reviewed admission note, no change since admission  Heart Failure: Acute [ ] Chronic [ ] Acute on Chronic [ ] Diastolic [ ] Systolic [ ] Combined Systolic and Diastolic[ ]  SJ[ ]  ATN[ ]  CKD I [ ] CKDII [ ] CKD III [ ] CKD IV [ ] CKD V [ ] ESRD[ ]  HTN[ ] CVA[ ] DM[ ] COPD[ ] COVID[ ] AF[ ]  PPM[ ] ICD[ ]    MEDICATIONS  (STANDING):  atorvastatin 40 milliGRAM(s) Oral at bedtime    MEDICATIONS  (PRN):  ibuprofen  Tablet. 400 milliGRAM(s) Oral every 8 hours PRN Moderate Pain (4 - 6)            REVIEW OF SYSTEMS:  Constitutional: [ ] fever, [ ]weight loss,  [ ]fatigue  Eyes: [ ] visual changes  Respiratory: [ ]shortness of breath;  [ ] cough, [ ]wheezing, [ ]chills, [ ]hemoptysis  Cardiovascular: [x ] chest pain, [ ]palpitations, [ ]dizziness,  [ ]leg swelling[ ]orthopnea[ ]PND  Gastrointestinal: [ ] abdominal pain, [ ]nausea, [ ]vomiting,  [ ]diarrhea [ ]Constipation [ ]Melena  Genitourinary: [ ] dysuria, [ ] hematuria [ ]Gunter  Neurologic: [ ] headaches [ ] tremors[ ]weakness [ ]Paralysis Right[ ] Left[ ]  Skin: [ ] itching, [ ]burning, [ ] rashes  Endocrine: [ ] heat or cold intolerance  Musculoskeletal: [ ] joint pain or swelling; [x ] muscle, back, or extremity pain  Psychiatric: [ ] depression, [ ]anxiety, [ ]mood swings, or [ ]difficulty sleeping  Hematologic: [ ] easy bruising, [ ] bleeding gums    [x] All remaining systems negative except as per above.   [ ]Unable to obtain.    Vital Signs Last 24 Hrs  T(C): 36.7 (22 Aug 2021 06:13), Max: 37 (21 Aug 2021 16:23)  T(F): 98.1 (22 Aug 2021 06:13), Max: 98.6 (21 Aug 2021 16:23)  HR: 78 (22 Aug 2021 06:13) (78 - 95)  BP: 127/87 (22 Aug 2021 06:13) (127/87 - 135/99)  RR: 18 (22 Aug 2021 06:13) (18 - 19)  SpO2: 99% (22 Aug 2021 06:13) (97% - 99%)  I&O's Summary      PHYSICAL EXAM:  General: No acute distress BMI-29  HEENT: EOMI, PERRL  Neck: Supple, [ ] JVD  Lungs: Equal air entry bilaterally; [ ] rales [ ] wheezing [ ] rhonchi  Heart: Regular rate and rhythm; [x ] murmur   2/6 [x ] systolic [ ] diastolic [ ] radiation[ ] rubs [ ]  gallops  Abdomen: Nontender, bowel sounds present  Extremities: No clubbing, cyanosis, [ ] edema [ ]Pulses  equal and intact  Nervous system:  Alert & Oriented X3, no focal deficits  Psychiatric: Normal affect  Skin: No rashes or lesions    LABS:  08-22    141  |  106  |  12  ----------------------------<  97  4.1   |  22  |  0.90    Ca    9.1      22 Aug 2021 05:35  Phos  3.5     08-22  Mg     2.30     08-22    TPro  6.6  /  Alb  4.1  /  TBili  0.3  /  DBili  x   /  AST  19  /  ALT  13  /  AlkPhos  74  08-22    Creatinine Trend: 0.90<--, 1.07<--                        16.6   10.75 )-----------( 266      ( 22 Aug 2021 05:35 )             49.3     PT/INR - ( 22 Aug 2021 05:35 )   PT: 11.5 sec;   INR: 1.01 ratio         PTT - ( 22 Aug 2021 05:35 )  PTT:34.9 sec    Cardiac Enzymes: CARDIAC MARKERS ( 21 Aug 2021 22:25 )  x     / x     / 270 U/L / x     / x                RADIOLOGY: XR SHOULDER COMP MIN 2V RT   INTERPRETATION:  no acute fracture or dislocation    ECG [my interpretation]:Sinus rhythm lateral ST depressions with TWI    TELEMETRY:Reviewed monitor tracings-Sinus rhythm    IMPRESSION AND PLAN:    42 y/o M with no pmhx presented for R shoulder pain started acutely 6 days ago. Incidentally found to have mild ST changes. Trops negative. Admitted for ACS work up and evaluation of R shoulder pain.    Problem/Plan - 1:  ·  Problem: Angina pectoris.  Plan: Assessment:  - the patient presented for R shoulder pain which was thought to be related to ACS  - Patient currently denies all chest pain, however does endorse anginal chest pain with running occasionally. Never had work up previously. Hx of smoking  - EKG shows Lead II, III, AvF 1mm IAN and V6 T wave inversion, which is similar to previous EKG 2 days ago, no changes  - Troponins negative (18->20)  -Ischemic work-up cardiac cath in AM    Problem/Plan - 2:  ·  Problem: Neck pain on right side.  Plan: Assessment:  - reproducible pain when neck extended backward and R arm abducted. No weakness on exam  - pain is likely secondary to muscle sprain secondary to physical stress  - x-rays shoulders are all unremarkable    Plan:  - start ibuprofen 400 mg Q8hr  - if there is burning pain, would start gabapentin  - obtain cervical x-ray to r/o fractures and evaluate for osteoarthritic changes  - MRI can be done outpatient, can consider inpatient if pain does not improve.     Problem/Plan - 3:  ·  Problem: Tobacco use disorder.  Plan: Assessment:  - hx of smoking 1ppk/day  
Neurology consult    ROHITHMICHELLE [id number]     Patient is a 41y old  Male who presents with a chief complaint of Chest pain (22 Aug 2021 08:29)      HPI:  This is a 40 y/o M with no pmhx presented for R shoulder pain started acutely 6 days ago. The patient was lifting heavy objects in his house before onset of pain. Pain started on the R neck and would radiate downward towards his arms. Denies trauma to the area. Denies weakness to the R arm. Pain is sharp like. Worse with movement when abducting his arms and flexion upward. Pain is constant. Denies numbness or tingling. Physical exam at bedside showed reproducible R shoulder/arm pain when the patient's head is extended backward and R arm raised upward.    He was in the ED 2 days ago for the same complaint, was discharged with pain meds.    He is also an active smoker. Patient is admitted for ACS work up. Currently denies active chest pain. However he does endorse occasional pressure-like chest pain when he is running. Active smoker of 1ppk/day. Denies hx of HLD, HTN or DM. No family hx of CAD or MI. Does not see a cardiologist. EKG at bedside showed Lead II, III and AvF IAN 1 mm, V6 T wave inversions, which was the same from 2 days ago.     Denies hx of surgeries or metal in body. Does not take daily medications. (21 Aug 2021 21:33)    Seen and examine this am, some improvement in pain    `T(C): 36.4 (08-27-21 @ 05:50), Max: 36.8 (08-26-21 @ 18:33)  HR: 72 (08-27-21 @ 05:50) (71 - 77)  BP: 120/87 (08-27-21 @ 05:50) (114/71 - 120/87)  RR: 17 (08-27-21 @ 05:50) (17 - 18)  SpO2: 100% (08-27-21 @ 05:50) (98% - 100%)    Medications:  ALPRAZolam 0.25 milliGRAM(s) Oral once  aspirin  chewable 81 milliGRAM(s) Oral daily  atorvastatin 40 milliGRAM(s) Oral at bedtime  dexAMETHasone     Tablet 3 milliGRAM(s) Oral two times a day  ketorolac   Injectable 15 milliGRAM(s) IV Push every 8 hours PRN  lidocaine   5% Patch 1 Patch Transdermal daily  oxycodone    5 mG/acetaminophen 325 mG 2 Tablet(s) Oral every 6 hours PRN                  On Neurological Examination:    Mental Status - Patient is alert, awake, oriented X3. fluent, names, no dysarthria no aphasia Follows commands well and able to answer questions appropriately. Mood and affect  normal    Cranial Nerves - PERRL, EOMI, VFF, V1-V3 intact, no gross facial asymmetry, tongue/uvula midline    Motor Exam -   Right upper 5/5 except tricep 4-/5  Left upper 5/5  Right lower 5/5  Left lower 5/5     nml bulk/tone    Sensory    Intact to light touch and pinprick bilaterally    Coord: FTN intact bilaterally     Gait -  normal      Reflexes:          2+ throughout                                               GENERAL Exam:     Nontoxic , No Acute Distress   	  HEENT:  normocephalic, atraumatic  		  LUNGS:	Clear bilaterally  No Wheeze    	  HEART:	 Normal S1S2   No murmur RRR        	  GI/ ABDOMEN:  Soft  Non tender    EXTREMITIES:   No Edema  No Clubbing  No Cyanosis No Edema    MUSCULOSKELETAL: Normal Range of Motion  	   SKIN:      Normal   No Ecchymosis               LABS:  CBC Full  -  ( 23 Aug 2021 08:19 )  WBC Count : 10.98 K/uL  RBC Count : 5.85 M/uL  Hemoglobin : 16.9 g/dL  Hematocrit : 51.2 %  Platelet Count - Automated : 319 K/uL  Mean Cell Volume : 87.5 fL  Mean Cell Hemoglobin : 28.9 pg  Mean Cell Hemoglobin Concentration : 33.0 gm/dL  Auto Neutrophil # : x  Auto Lymphocyte # : x  Auto Monocyte # : x  Auto Eosinophil # : x  Auto Basophil # : x  Auto Neutrophil % : x  Auto Lymphocyte % : x  Auto Monocyte % : x  Auto Eosinophil % : x  Auto Basophil % : x      08-23    135  |  99  |  11  ----------------------------<  103<H>  4.1   |  21<L>  |  0.94    Ca    9.5      23 Aug 2021 08:19  Phos  4.0     08-23  Mg     2.30     08-23    TPro  6.6  /  Alb  4.1  /  TBili  0.3  /  DBili  x   /  AST  19  /  ALT  13  /  AlkPhos  74  08-22    LIVER FUNCTIONS - ( 22 Aug 2021 05:35 )  Alb: 4.1 g/dL / Pro: 6.6 g/dL / ALK PHOS: 74 U/L / ALT: 13 U/L / AST: 19 U/L / GGT: x           Hemoglobin A1C:       PT/INR - ( 22 Aug 2021 05:35 )   PT: 11.5 sec;   INR: 1.01 ratio         PTT - ( 22 Aug 2021 05:35 )  PTT:34.9 sec      RADIOLOGY                  
Neurology consult    ROHITHMICHELLE [id number]     Patient is a 41y old  Male who presents with a chief complaint of Chest pain (22 Aug 2021 08:29)      HPI:  This is a 40 y/o M with no pmhx presented for R shoulder pain started acutely 6 days ago. The patient was lifting heavy objects in his house before onset of pain. Pain started on the R neck and would radiate downward towards his arms. Denies trauma to the area. Denies weakness to the R arm. Pain is sharp like. Worse with movement when abducting his arms and flexion upward. Pain is constant. Denies numbness or tingling. Physical exam at bedside showed reproducible R shoulder/arm pain when the patient's head is extended backward and R arm raised upward.    He was in the ED 2 days ago for the same complaint, was discharged with pain meds.    He is also an active smoker. Patient is admitted for ACS work up. Currently denies active chest pain. However he does endorse occasional pressure-like chest pain when he is running. Active smoker of 1ppk/day. Denies hx of HLD, HTN or DM. No family hx of CAD or MI. Does not see a cardiologist. EKG at bedside showed Lead II, III and AvF IAN 1 mm, V6 T wave inversions, which was the same from 2 days ago.     Denies hx of surgeries or metal in body. Does not take daily medications. (21 Aug 2021 21:33)    Seen and examine dthis am, still endorses right neck pain    T(C): 36.4 (08-26-21 @ 06:00), Max: 36.4 (08-25-21 @ 22:00)  HR: 75 (08-26-21 @ 06:00) (75 - 79)  BP: 110/75 (08-26-21 @ 06:00) (110/75 - 126/85)  RR: 19 (08-26-21 @ 06:00) (19 - 20)  SpO2: 100% (08-26-21 @ 06:00) (97% - 100%)    Medications:  ALPRAZolam 0.25 milliGRAM(s) Oral once  aspirin  chewable 81 milliGRAM(s) Oral daily  atorvastatin 40 milliGRAM(s) Oral at bedtime  dexAMETHasone     Tablet 3 milliGRAM(s) Oral two times a day  ketorolac   Injectable 15 milliGRAM(s) IV Push every 8 hours PRN  lidocaine   5% Patch 1 Patch Transdermal daily  oxycodone    5 mG/acetaminophen 325 mG 2 Tablet(s) Oral every 6 hours PRN            On Neurological Examination:    Mental Status - Patient is alert, awake, oriented X3. fluent, names, no dysarthria no aphasia Follows commands well and able to answer questions appropriately. Mood and affect  normal    Cranial Nerves - PERRL, EOMI, VFF, V1-V3 intact, no gross facial asymmetry, tongue/uvula midline    Motor Exam -   Right upper 5/5 except tricep 4-/5  Left upper 5/5  Right lower 5/5  Left lower 5/5     nml bulk/tone    Sensory    Intact to light touch and pinprick bilaterally    Coord: FTN intact bilaterally     Gait -  normal      Reflexes:          2+ throughout                                               GENERAL Exam:     Nontoxic , No Acute Distress   	  HEENT:  normocephalic, atraumatic  		  LUNGS:	Clear bilaterally  No Wheeze    	  HEART:	 Normal S1S2   No murmur RRR        	  GI/ ABDOMEN:  Soft  Non tender    EXTREMITIES:   No Edema  No Clubbing  No Cyanosis No Edema    MUSCULOSKELETAL: Normal Range of Motion  	   SKIN:      Normal   No Ecchymosis               LABS:  CBC Full  -  ( 23 Aug 2021 08:19 )  WBC Count : 10.98 K/uL  RBC Count : 5.85 M/uL  Hemoglobin : 16.9 g/dL  Hematocrit : 51.2 %  Platelet Count - Automated : 319 K/uL  Mean Cell Volume : 87.5 fL  Mean Cell Hemoglobin : 28.9 pg  Mean Cell Hemoglobin Concentration : 33.0 gm/dL  Auto Neutrophil # : x  Auto Lymphocyte # : x  Auto Monocyte # : x  Auto Eosinophil # : x  Auto Basophil # : x  Auto Neutrophil % : x  Auto Lymphocyte % : x  Auto Monocyte % : x  Auto Eosinophil % : x  Auto Basophil % : x      08-23    135  |  99  |  11  ----------------------------<  103<H>  4.1   |  21<L>  |  0.94    Ca    9.5      23 Aug 2021 08:19  Phos  4.0     08-23  Mg     2.30     08-23    TPro  6.6  /  Alb  4.1  /  TBili  0.3  /  DBili  x   /  AST  19  /  ALT  13  /  AlkPhos  74  08-22    LIVER FUNCTIONS - ( 22 Aug 2021 05:35 )  Alb: 4.1 g/dL / Pro: 6.6 g/dL / ALK PHOS: 74 U/L / ALT: 13 U/L / AST: 19 U/L / GGT: x           Hemoglobin A1C:       PT/INR - ( 22 Aug 2021 05:35 )   PT: 11.5 sec;   INR: 1.01 ratio         PTT - ( 22 Aug 2021 05:35 )  PTT:34.9 sec      RADIOLOGY                  
Neurology consult    ROHITHMICHELLE [id number]     Patient is a 41y old  Male who presents with a chief complaint of Chest pain (22 Aug 2021 08:29)      HPI:  This is a 40 y/o M with no pmhx presented for R shoulder pain started acutely 6 days ago. The patient was lifting heavy objects in his house before onset of pain. Pain started on the R neck and would radiate downward towards his arms. Denies trauma to the area. Denies weakness to the R arm. Pain is sharp like. Worse with movement when abducting his arms and flexion upward. Pain is constant. Denies numbness or tingling. Physical exam at bedside showed reproducible R shoulder/arm pain when the patient's head is extended backward and R arm raised upward.    He was in the ED 2 days ago for the same complaint, was discharged with pain meds.    He is also an active smoker. Patient is admitted for ACS work up. Currently denies active chest pain. However he does endorse occasional pressure-like chest pain when he is running. Active smoker of 1ppk/day. Denies hx of HLD, HTN or DM. No family hx of CAD or MI. Does not see a cardiologist. EKG at bedside showed Lead II, III and AvF IAN 1 mm, V6 T wave inversions, which was the same from 2 days ago.     Denies hx of surgeries or metal in body. Does not take daily medications. (21 Aug 2021 21:33)    Seen and examine dthis am, still endorses right neck pain      MEDICATIONS  (STANDING):  aspirin  chewable 81 milliGRAM(s) Oral daily  atorvastatin 40 milliGRAM(s) Oral at bedtime  dexAMETHasone     Tablet 4 milliGRAM(s) Oral two times a day    MEDICATIONS  (PRN):  ketorolac   Injectable 15 milliGRAM(s) IV Push every 8 hours PRN Severe Pain (7 - 10)  oxycodone    5 mG/acetaminophen 325 mG 2 Tablet(s) Oral every 6 hours PRN Moderate Pain (4 - 6)          Vital Signs Last 24 Hrs  T(C): 36.7 (24 Aug 2021 01:21), Max: 36.7 (23 Aug 2021 17:21)  T(F): 98.1 (24 Aug 2021 01:21), Max: 98.1 (23 Aug 2021 17:21)  HR: 88 (24 Aug 2021 01:21) (78 - 88)  BP: 132/85 (24 Aug 2021 01:21) (132/65 - 132/85)  BP(mean): --  RR: 16 (24 Aug 2021 01:21) (16 - 18)  SpO2: 100% (24 Aug 2021 01:21) (100% - 100%)    On Neurological Examination:    Mental Status - Patient is alert, awake, oriented X3. fluent, names, no dysarthria no aphasia Follows commands well and able to answer questions appropriately. Mood and affect  normal    Cranial Nerves - PERRL, EOMI, VFF, V1-V3 intact, no gross facial asymmetry, tongue/uvula midline    Motor Exam -   Right upper 5/5 except tricep 4-/5  Left upper 5/5  Right lower 5/5  Left lower 5/5     nml bulk/tone    Sensory    Intact to light touch and pinprick bilaterally    Coord: FTN intact bilaterally     Gait -  normal      Reflexes:          2+ throughout                                               GENERAL Exam:     Nontoxic , No Acute Distress   	  HEENT:  normocephalic, atraumatic  		  LUNGS:	Clear bilaterally  No Wheeze    	  HEART:	 Normal S1S2   No murmur RRR        	  GI/ ABDOMEN:  Soft  Non tender    EXTREMITIES:   No Edema  No Clubbing  No Cyanosis No Edema    MUSCULOSKELETAL: Normal Range of Motion  	   SKIN:      Normal   No Ecchymosis               LABS:  CBC Full  -  ( 23 Aug 2021 08:19 )  WBC Count : 10.98 K/uL  RBC Count : 5.85 M/uL  Hemoglobin : 16.9 g/dL  Hematocrit : 51.2 %  Platelet Count - Automated : 319 K/uL  Mean Cell Volume : 87.5 fL  Mean Cell Hemoglobin : 28.9 pg  Mean Cell Hemoglobin Concentration : 33.0 gm/dL  Auto Neutrophil # : x  Auto Lymphocyte # : x  Auto Monocyte # : x  Auto Eosinophil # : x  Auto Basophil # : x  Auto Neutrophil % : x  Auto Lymphocyte % : x  Auto Monocyte % : x  Auto Eosinophil % : x  Auto Basophil % : x      08-23    135  |  99  |  11  ----------------------------<  103<H>  4.1   |  21<L>  |  0.94    Ca    9.5      23 Aug 2021 08:19  Phos  4.0     08-23  Mg     2.30     08-23    TPro  6.6  /  Alb  4.1  /  TBili  0.3  /  DBili  x   /  AST  19  /  ALT  13  /  AlkPhos  74  08-22    LIVER FUNCTIONS - ( 22 Aug 2021 05:35 )  Alb: 4.1 g/dL / Pro: 6.6 g/dL / ALK PHOS: 74 U/L / ALT: 13 U/L / AST: 19 U/L / GGT: x           Hemoglobin A1C:       PT/INR - ( 22 Aug 2021 05:35 )   PT: 11.5 sec;   INR: 1.01 ratio         PTT - ( 22 Aug 2021 05:35 )  PTT:34.9 sec      RADIOLOGY                  
    SUBJECTIVE / OVERNIGHT EVENTS:pt seen and examined  c/o rt upper ext pain     MEDICATIONS  (STANDING):  ALPRAZolam 0.25 milliGRAM(s) Oral once  aspirin  chewable 81 milliGRAM(s) Oral daily  atorvastatin 40 milliGRAM(s) Oral at bedtime  lidocaine   5% Patch 1 Patch Transdermal daily    MEDICATIONS  (PRN):  ketorolac   Injectable 15 milliGRAM(s) IV Push every 8 hours PRN Severe Pain (7 - 10)  oxycodone    5 mG/acetaminophen 325 mG 2 Tablet(s) Oral every 6 hours PRN Moderate Pain (4 - 6)    Vital Signs Last 24 Hrs  T(C): 36.4 (25 Aug 2021 22:00), Max: 36.7 (25 Aug 2021 01:11)  T(F): 97.6 (25 Aug 2021 22:00), Max: 98.1 (25 Aug 2021 01:11)  HR: 79 (25 Aug 2021 22:00) (60 - 80)  BP: 126/85 (25 Aug 2021 22:00) (112/72 - 126/85)  BP(mean): 99 (25 Aug 2021 22:00) (99 - 99)  RR: 20 (25 Aug 2021 22:00) (16 - 20)  SpO2: 97% (25 Aug 2021 22:00) (95% - 99%)    Constitutional: No fever, fatigue  Skin: No rash.  Eyes: No recent vision problems or eye pain.  ENT: No congestion, ear pain, or sore throat.  Cardiovascular: No chest pain or palpation.  Respiratory: No cough, shortness of breath, congestion, or wheezing.  Gastrointestinal: No abdominal pain, nausea, vomiting, or diarrhea.  Genitourinary: No dysuria.  Musculoskeletal: No joint swelling.rt upper ext pain   Neurologic: No headache.    PHYSICAL EXAM:  GENERAL: NAD  EYES: EOMI, PERRLA  NECK: Supple, No JVD  CHEST/LUNG: cta gege  HEART:  S1 , S2 +  ABDOMEN: soft , bs+  EXTREMITIES:  no edema, dec rom of rt upper ext   NEUROLOGY:alert awake oriented     LABS:  08-25    139  |  103  |  17  ----------------------------<  133<H>  4.5   |  21<L>  |  0.89    Ca    9.5      25 Aug 2021 06:45  Phos  3.4     08-25  Mg     2.30     08-25      Creatinine Trend: 0.89 <--, 0.95 <--, 0.94 <--, 0.90 <--, 1.07 <--                        16.5   21.27 )-----------( 355      ( 25 Aug 2021 06:45 )             49.4     Urine Studies:                Imaging Personally Reviewed:    Consultant(s) Notes Reviewed:      Care Discussed with Consultants/Other Providers:  
    SUBJECTIVE / OVERNIGHT EVENTS:pt seen and examined  c/o rt upper ext pain     MEDICATIONS  (STANDING):  aspirin  chewable 81 milliGRAM(s) Oral daily  atorvastatin 40 milliGRAM(s) Oral at bedtime  dexAMETHasone     Tablet 4 milliGRAM(s) Oral two times a day    MEDICATIONS  (PRN):  ketorolac   Injectable 15 milliGRAM(s) IV Push every 8 hours PRN Severe Pain (7 - 10)  oxycodone    5 mG/acetaminophen 325 mG 2 Tablet(s) Oral every 6 hours PRN Moderate Pain (4 - 6)    Vital Signs Last 24 Hrs  T(C): 36.8 (24 Aug 2021 18:40), Max: 36.8 (24 Aug 2021 13:25)  T(F): 98.2 (24 Aug 2021 18:40), Max: 98.3 (24 Aug 2021 13:25)  HR: 80 (24 Aug 2021 18:40) (80 - 88)  BP: 118/68 (24 Aug 2021 18:40) (118/68 - 132/85)  BP(mean): --  RR: 17 (24 Aug 2021 18:40) (16 - 17)  SpO2: 100% (24 Aug 2021 18:40) (100% - 100%)    Constitutional: No fever, fatigue  Skin: No rash.  Eyes: No recent vision problems or eye pain.  ENT: No congestion, ear pain, or sore throat.  Cardiovascular: No chest pain or palpation.  Respiratory: No cough, shortness of breath, congestion, or wheezing.  Gastrointestinal: No abdominal pain, nausea, vomiting, or diarrhea.  Genitourinary: No dysuria.  Musculoskeletal: No joint swelling.rt upper ext pain   Neurologic: No headache.    PHYSICAL EXAM:  GENERAL: NAD  EYES: EOMI, PERRLA  NECK: Supple, No JVD  CHEST/LUNG: cta gege  HEART:  S1 , S2 +  ABDOMEN: soft , bs+  EXTREMITIES:  no edema, dec rom of rt upper ext   NEUROLOGY:alert awake oriented     LABS:  08-24    140  |  100  |  18  ----------------------------<  134<H>  4.9   |  21<L>  |  0.95    Ca    10.3      24 Aug 2021 08:16  Phos  3.1     08-24  Mg     2.30     08-24      Creatinine Trend: 0.95 <--, 0.94 <--, 0.90 <--, 1.07 <--                        17.1   20.20 )-----------( 362      ( 24 Aug 2021 08:16 )             50.7     Urine Studies:                  Imaging Personally Reviewed:    Consultant(s) Notes Reviewed:      Care Discussed with Consultants/Other Providers:  
    SUBJECTIVE / OVERNIGHT EVENTS:pt seen and examined  c/o rt upper ext pain     MEDICATIONS  (STANDING):  aspirin  chewable 81 milliGRAM(s) Oral daily  atorvastatin 40 milliGRAM(s) Oral at bedtime  dexAMETHasone     Tablet 4 milliGRAM(s) Oral two times a day    MEDICATIONS  (PRN):  ketorolac   Injectable 15 milliGRAM(s) IV Push every 8 hours PRN Severe Pain (7 - 10)  oxycodone    5 mG/acetaminophen 325 mG 2 Tablet(s) Oral every 6 hours PRN Moderate Pain (4 - 6)  Vital Signs Last 24 Hrs  T(C): 36.7 (23 Aug 2021 17:21), Max: 36.7 (23 Aug 2021 17:21)  T(F): 98.1 (23 Aug 2021 17:21), Max: 98.1 (23 Aug 2021 17:21)  HR: 78 (23 Aug 2021 17:21) (78 - 78)  BP: 132/65 (23 Aug 2021 17:21) (120/83 - 132/65)  BP(mean): --  RR: 18 (23 Aug 2021 17:21) (18 - 18)  SpO2: 100% (23 Aug 2021 17:21) (100% - 100%)      CAPILLARY BLOOD GLUCOSE      POCT Blood Glucose.: 126 mg/dL (23 Aug 2021 09:04)    I&O's Summary      Constitutional: No fever, fatigue  Skin: No rash.  Eyes: No recent vision problems or eye pain.  ENT: No congestion, ear pain, or sore throat.  Cardiovascular: No chest pain or palpation.  Respiratory: No cough, shortness of breath, congestion, or wheezing.  Gastrointestinal: No abdominal pain, nausea, vomiting, or diarrhea.  Genitourinary: No dysuria.  Musculoskeletal: No joint swelling.rt upper ext pain   Neurologic: No headache.    PHYSICAL EXAM:  GENERAL: NAD  EYES: EOMI, PERRLA  NECK: Supple, No JVD  CHEST/LUNG: cta gege  HEART:  S1 , S2 +  ABDOMEN: soft , bs+  EXTREMITIES:  no edema, dec rom of rt upper ext   NEUROLOGY:alert awake oriented       LABS:                        16.9   10.98 )-----------( 319      ( 23 Aug 2021 08:19 )             51.2     08-23    135  |  99  |  11  ----------------------------<  103<H>  4.1   |  21<L>  |  0.94    Ca    9.5      23 Aug 2021 08:19  Phos  4.0     08-23  Mg     2.30     08-23    TPro  6.6  /  Alb  4.1  /  TBili  0.3  /  DBili  x   /  AST  19  /  ALT  13  /  AlkPhos  74  08-22    PT/INR - ( 22 Aug 2021 05:35 )   PT: 11.5 sec;   INR: 1.01 ratio         PTT - ( 22 Aug 2021 05:35 )  PTT:34.9 sec          RADIOLOGY & ADDITIONAL TESTS:    Imaging Personally Reviewed:    Consultant(s) Notes Reviewed:      Care Discussed with Consultants/Other Providers:  
    SUBJECTIVE / OVERNIGHT EVENTS:pt seen and examined  c/o rt upper ext pain     MEDICATIONS  (STANDING):  ALPRAZolam 0.25 milliGRAM(s) Oral once  aspirin  chewable 81 milliGRAM(s) Oral daily  atorvastatin 40 milliGRAM(s) Oral at bedtime  dexAMETHasone     Tablet 2 milliGRAM(s) Oral two times a day  lidocaine   5% Patch 1 Patch Transdermal daily    MEDICATIONS  (PRN):  ketorolac   Injectable 15 milliGRAM(s) IV Push every 8 hours PRN Severe Pain (7 - 10)  oxycodone    5 mG/acetaminophen 325 mG 2 Tablet(s) Oral every 6 hours PRN Moderate Pain (4 - 6)      Vital Signs Last 24 Hrs  T(C): 36.6 (28 Aug 2021 17:59), Max: 36.6 (28 Aug 2021 17:59)  T(F): 97.8 (28 Aug 2021 17:59), Max: 97.8 (28 Aug 2021 17:59)  HR: 83 (28 Aug 2021 17:59) (83 - 90)  BP: 108/70 (28 Aug 2021 17:59) (105/79 - 125/88)  BP(mean): --  RR: 18 (28 Aug 2021 17:59) (17 - 18)  SpO2: 100% (28 Aug 2021 17:59) (100% - 100%)    Constitutional: No fever, fatigue  Skin: No rash.  Eyes: No recent vision problems or eye pain.  ENT: No congestion, ear pain, or sore throat.  Cardiovascular: No chest pain or palpation.  Respiratory: No cough, shortness of breath, congestion, or wheezing.  Gastrointestinal: No abdominal pain, nausea, vomiting, or diarrhea.  Genitourinary: No dysuria.  Musculoskeletal: No joint swelling.rt upper ext pain   Neurologic: No headache.    PHYSICAL EXAM:  GENERAL: NAD  EYES: EOMI, PERRLA  NECK: Supple, No JVD  CHEST/LUNG: cta gege  HEART:  S1 , S2 +  ABDOMEN: soft , bs+  EXTREMITIES:  no edema, dec rom of rt upper ext   NEUROLOGY:alert awake oriented     LABS:  08-28    138  |  98  |  24<H>  ----------------------------<  122<H>  4.5   |  26  |  1.02    Ca    9.6      28 Aug 2021 07:46  Phos  3.7     08-28  Mg     2.40     08-28      Creatinine Trend: 1.02 <--, 0.92 <--, 0.95 <--, 0.89 <--, 0.95 <--, 0.94 <--, 0.90 <--                        17.0   19.53 )-----------( 361      ( 28 Aug 2021 07:46 )             50.8     Urine Studies:                            
    SUBJECTIVE / OVERNIGHT EVENTS:pt seen and examined  c/o rt upper ext pain     MEDICATIONS  (STANDING):  ALPRAZolam 0.25 milliGRAM(s) Oral once  aspirin  chewable 81 milliGRAM(s) Oral daily  atorvastatin 40 milliGRAM(s) Oral at bedtime  lidocaine   5% Patch 1 Patch Transdermal daily    MEDICATIONS  (PRN):  ketorolac   Injectable 15 milliGRAM(s) IV Push every 8 hours PRN Severe Pain (7 - 10)  oxycodone    5 mG/acetaminophen 325 mG 2 Tablet(s) Oral every 6 hours PRN Moderate Pain (4 - 6)    Vital Signs Last 24 Hrs  T(C): 36.6 (08-27-21 @ 17:45), Max: 36.9 (08-27-21 @ 09:30)  T(F): 97.8 (08-27-21 @ 17:45), Max: 98.5 (08-27-21 @ 09:30)  HR: 73 (08-27-21 @ 17:45) (72 - 73)  BP: 123/86 (08-27-21 @ 17:45) (110/89 - 123/86)  RR: 17 (08-27-21 @ 17:45) (17 - 18)  SpO2: 100% (08-27-21 @ 17:45) (100% - 100%)  Wt(kg): --    Constitutional: No fever, fatigue  Skin: No rash.  Eyes: No recent vision problems or eye pain.  ENT: No congestion, ear pain, or sore throat.  Cardiovascular: No chest pain or palpation.  Respiratory: No cough, shortness of breath, congestion, or wheezing.  Gastrointestinal: No abdominal pain, nausea, vomiting, or diarrhea.  Genitourinary: No dysuria.  Musculoskeletal: No joint swelling.rt upper ext pain   Neurologic: No headache.    PHYSICAL EXAM:  GENERAL: NAD  EYES: EOMI, PERRLA  NECK: Supple, No JVD  CHEST/LUNG: cta gege  HEART:  S1 , S2 +  ABDOMEN: soft , bs+  EXTREMITIES:  no edema, dec rom of rt upper ext   NEUROLOGY:alert awake oriented     LABS:  08-27    136  |  97<L>  |  20  ----------------------------<  97  4.1   |  25  |  0.92    Ca    9.7      27 Aug 2021 08:33  Phos  3.9     08-27  Mg     2.30     08-27      Creatinine Trend: 0.92 <--, 0.95 <--, 0.89 <--, 0.95 <--, 0.94 <--, 0.90 <--, 1.07 <--                        17.2   20.48 )-----------( 374      ( 27 Aug 2021 08:33 )             52.6     Urine Studies:

## 2021-08-28 NOTE — PROGRESS NOTE ADULT - PROVIDER SPECIALTY LIST ADULT
Neurology
Neurology
Cardiology
Neurology
Internal Medicine

## 2021-08-28 NOTE — PROGRESS NOTE ADULT - PROBLEM SELECTOR PLAN 1
s/p cath c/w multivessel disease   poss cabg - pt was informed by cards
s/p cath c/w multivessel disease   poss cabg - pt was informed by cards
Assessment:  - the patient presented for R shoulder pain which was thought to be related to ACS  - Patient currently denies all chest pain, however does endorse anginal chest pain with running occasionally. Never had work up previously. Hx of smoking  - EKG shows Lead II, III, AvF 1mm IAN and V6 T wave inversion, which is similar to previous EKG 2 days ago, no changes  - Troponins negative (18->20)  - patient unlikely to have active ACS at this time due to neg tropx x2 and unchanged EKG. Patient currently sitting at bedside comfortable with no symptoms of ACS. Not in acute distress.  - d-dimer neg - unlikely PE    Plan:  - due to hx of active smoking and EKG findings, and findings of anginal chest pain, would recommend stress test for evaluation of ischemic heart disease, consider cath if stress test is positive  - cardiology consult - I spoke to the Cardio PA, who spoke to Dr. Mendez. There is no indication for emergent cath at this time as per Dr. Mendez  - obtain echo  - no indication for heparin drip at this time because the patient does not have active chest pain and neg troponins  - will obtain repeat STAT troponins, repeat EKG tonight - if pos, will start heparin drip  - tele monitoring  - will obtain Lipid profile and A1c for risk stratification
cath today   cont current cardiac meds
s/p cath c/w multivessel disease   poss cabg - pt was informed by cards
s/p cath c/w multivessel disease   poss cabg - pt was informed by cards

## 2021-08-29 PROCEDURE — 72141 MRI NECK SPINE W/O DYE: CPT | Mod: 26

## 2021-08-29 RX ADMIN — LIDOCAINE 1 PATCH: 4 CREAM TOPICAL at 19:45

## 2021-08-29 RX ADMIN — Medication 2 MILLIGRAM(S): at 18:14

## 2021-08-29 RX ADMIN — OXYCODONE AND ACETAMINOPHEN 2 TABLET(S): 5; 325 TABLET ORAL at 15:35

## 2021-08-29 RX ADMIN — ATORVASTATIN CALCIUM 40 MILLIGRAM(S): 80 TABLET, FILM COATED ORAL at 21:41

## 2021-08-29 RX ADMIN — LIDOCAINE 1 PATCH: 4 CREAM TOPICAL at 00:21

## 2021-08-29 RX ADMIN — Medication 81 MILLIGRAM(S): at 12:57

## 2021-08-29 RX ADMIN — LIDOCAINE 1 PATCH: 4 CREAM TOPICAL at 12:57

## 2021-08-29 RX ADMIN — Medication 2 MILLIGRAM(S): at 05:45

## 2021-08-29 RX ADMIN — Medication 0.25 MILLIGRAM(S): at 19:59

## 2021-08-30 ENCOUNTER — INPATIENT (INPATIENT)
Facility: HOSPITAL | Age: 41
LOS: 9 days | Discharge: HOME HEALTH | DRG: 235 | End: 2021-09-09
Attending: THORACIC SURGERY (CARDIOTHORACIC VASCULAR SURGERY) | Admitting: THORACIC SURGERY (CARDIOTHORACIC VASCULAR SURGERY)
Payer: MEDICAID

## 2021-08-30 VITALS
OXYGEN SATURATION: 99 % | HEIGHT: 60.8 IN | WEIGHT: 162.92 LBS | DIASTOLIC BLOOD PRESSURE: 79 MMHG | HEART RATE: 81 BPM | TEMPERATURE: 98 F | RESPIRATION RATE: 18 BRPM | SYSTOLIC BLOOD PRESSURE: 118 MMHG

## 2021-08-30 VITALS
DIASTOLIC BLOOD PRESSURE: 81 MMHG | TEMPERATURE: 99 F | OXYGEN SATURATION: 99 % | HEART RATE: 88 BPM | SYSTOLIC BLOOD PRESSURE: 107 MMHG | RESPIRATION RATE: 18 BRPM

## 2021-08-30 DIAGNOSIS — I25.10 ATHEROSCLEROTIC HEART DISEASE OF NATIVE CORONARY ARTERY WITHOUT ANGINA PECTORIS: ICD-10-CM

## 2021-08-30 LAB
ANION GAP SERPL CALC-SCNC: 13 MMOL/L — SIGNIFICANT CHANGE UP (ref 7–14)
BUN SERPL-MCNC: 22 MG/DL — SIGNIFICANT CHANGE UP (ref 7–23)
CALCIUM SERPL-MCNC: 9.3 MG/DL — SIGNIFICANT CHANGE UP (ref 8.4–10.5)
CHLORIDE SERPL-SCNC: 98 MMOL/L — SIGNIFICANT CHANGE UP (ref 98–107)
CO2 SERPL-SCNC: 22 MMOL/L — SIGNIFICANT CHANGE UP (ref 22–31)
CREAT SERPL-MCNC: 0.85 MG/DL — SIGNIFICANT CHANGE UP (ref 0.5–1.3)
GLUCOSE SERPL-MCNC: 128 MG/DL — HIGH (ref 70–99)
HCT VFR BLD CALC: 52.3 % — HIGH (ref 39–50)
HGB BLD-MCNC: 17.4 G/DL — HIGH (ref 13–17)
MAGNESIUM SERPL-MCNC: 2.3 MG/DL — SIGNIFICANT CHANGE UP (ref 1.6–2.6)
MCHC RBC-ENTMCNC: 29.1 PG — SIGNIFICANT CHANGE UP (ref 27–34)
MCHC RBC-ENTMCNC: 33.3 GM/DL — SIGNIFICANT CHANGE UP (ref 32–36)
MCV RBC AUTO: 87.5 FL — SIGNIFICANT CHANGE UP (ref 80–100)
NRBC # BLD: 0 /100 WBCS — SIGNIFICANT CHANGE UP
NRBC # FLD: 0 K/UL — SIGNIFICANT CHANGE UP
PHOSPHATE SERPL-MCNC: 3.7 MG/DL — SIGNIFICANT CHANGE UP (ref 2.5–4.5)
PLATELET # BLD AUTO: 347 K/UL — SIGNIFICANT CHANGE UP (ref 150–400)
POTASSIUM SERPL-MCNC: 4.2 MMOL/L — SIGNIFICANT CHANGE UP (ref 3.5–5.3)
POTASSIUM SERPL-SCNC: 4.2 MMOL/L — SIGNIFICANT CHANGE UP (ref 3.5–5.3)
RBC # BLD: 5.98 M/UL — HIGH (ref 4.2–5.8)
RBC # FLD: 13.3 % — SIGNIFICANT CHANGE UP (ref 10.3–14.5)
SODIUM SERPL-SCNC: 133 MMOL/L — LOW (ref 135–145)
WBC # BLD: 20.04 K/UL — HIGH (ref 3.8–10.5)
WBC # FLD AUTO: 20.04 K/UL — HIGH (ref 3.8–10.5)

## 2021-08-30 RX ORDER — DEXAMETHASONE 0.5 MG/5ML
2 ELIXIR ORAL
Qty: 0 | Refills: 0 | DISCHARGE
Start: 2021-08-30

## 2021-08-30 RX ORDER — PANTOPRAZOLE SODIUM 20 MG/1
40 TABLET, DELAYED RELEASE ORAL
Refills: 0 | Status: DISCONTINUED | OUTPATIENT
Start: 2021-08-30 | End: 2021-09-03

## 2021-08-30 RX ORDER — ASPIRIN/CALCIUM CARB/MAGNESIUM 324 MG
81 TABLET ORAL DAILY
Refills: 0 | Status: DISCONTINUED | OUTPATIENT
Start: 2021-08-31 | End: 2021-09-03

## 2021-08-30 RX ORDER — ENOXAPARIN SODIUM 100 MG/ML
40 INJECTION SUBCUTANEOUS DAILY
Refills: 0 | Status: DISCONTINUED | OUTPATIENT
Start: 2021-08-31 | End: 2021-09-01

## 2021-08-30 RX ORDER — METOPROLOL TARTRATE 50 MG
25 TABLET ORAL EVERY 8 HOURS
Refills: 0 | Status: DISCONTINUED | OUTPATIENT
Start: 2021-08-30 | End: 2021-09-03

## 2021-08-30 RX ORDER — SENNA PLUS 8.6 MG/1
2 TABLET ORAL AT BEDTIME
Refills: 0 | Status: DISCONTINUED | OUTPATIENT
Start: 2021-08-30 | End: 2021-08-30

## 2021-08-30 RX ORDER — KETOROLAC TROMETHAMINE 30 MG/ML
15 SYRINGE (ML) INJECTION
Qty: 0 | Refills: 0 | DISCHARGE
Start: 2021-08-30

## 2021-08-30 RX ORDER — DEXAMETHASONE 0.5 MG/5ML
2 ELIXIR ORAL ONCE
Refills: 0 | Status: COMPLETED | OUTPATIENT
Start: 2021-08-30 | End: 2021-08-30

## 2021-08-30 RX ORDER — DEXAMETHASONE 0.5 MG/5ML
ELIXIR ORAL
Refills: 0 | Status: DISCONTINUED | OUTPATIENT
Start: 2021-08-30 | End: 2021-08-30

## 2021-08-30 RX ORDER — SENNA PLUS 8.6 MG/1
2 TABLET ORAL
Qty: 0 | Refills: 0 | DISCHARGE
Start: 2021-08-30

## 2021-08-30 RX ORDER — SENNA PLUS 8.6 MG/1
2 TABLET ORAL AT BEDTIME
Refills: 0 | Status: DISCONTINUED | OUTPATIENT
Start: 2021-08-30 | End: 2021-09-03

## 2021-08-30 RX ORDER — DEXAMETHASONE 0.5 MG/5ML
2 ELIXIR ORAL
Refills: 0 | Status: DISCONTINUED | OUTPATIENT
Start: 2021-08-31 | End: 2021-08-31

## 2021-08-30 RX ORDER — OXYCODONE AND ACETAMINOPHEN 5; 325 MG/1; MG/1
2 TABLET ORAL EVERY 6 HOURS
Refills: 0 | Status: DISCONTINUED | OUTPATIENT
Start: 2021-08-30 | End: 2021-08-30

## 2021-08-30 RX ORDER — DEXAMETHASONE 0.5 MG/5ML
2 ELIXIR ORAL
Refills: 0 | Status: DISCONTINUED | OUTPATIENT
Start: 2021-08-30 | End: 2021-08-30

## 2021-08-30 RX ORDER — KETOROLAC TROMETHAMINE 30 MG/ML
15 SYRINGE (ML) INJECTION EVERY 8 HOURS
Refills: 0 | Status: DISCONTINUED | OUTPATIENT
Start: 2021-08-30 | End: 2021-08-30

## 2021-08-30 RX ORDER — ATORVASTATIN CALCIUM 80 MG/1
40 TABLET, FILM COATED ORAL AT BEDTIME
Refills: 0 | Status: DISCONTINUED | OUTPATIENT
Start: 2021-08-30 | End: 2021-09-03

## 2021-08-30 RX ORDER — SODIUM CHLORIDE 9 MG/ML
3 INJECTION INTRAMUSCULAR; INTRAVENOUS; SUBCUTANEOUS EVERY 8 HOURS
Refills: 0 | Status: DISCONTINUED | OUTPATIENT
Start: 2021-08-30 | End: 2021-09-03

## 2021-08-30 RX ADMIN — Medication 81 MILLIGRAM(S): at 12:28

## 2021-08-30 RX ADMIN — LIDOCAINE 1 PATCH: 4 CREAM TOPICAL at 12:28

## 2021-08-30 RX ADMIN — Medication 2 MILLIGRAM(S): at 21:35

## 2021-08-30 RX ADMIN — ATORVASTATIN CALCIUM 40 MILLIGRAM(S): 80 TABLET, FILM COATED ORAL at 21:33

## 2021-08-30 RX ADMIN — Medication 25 MILLIGRAM(S): at 21:32

## 2021-08-30 RX ADMIN — LIDOCAINE 1 PATCH: 4 CREAM TOPICAL at 00:36

## 2021-08-30 RX ADMIN — Medication 2 MILLIGRAM(S): at 06:36

## 2021-08-30 RX ADMIN — SODIUM CHLORIDE 3 MILLILITER(S): 9 INJECTION INTRAMUSCULAR; INTRAVENOUS; SUBCUTANEOUS at 21:28

## 2021-08-30 NOTE — DISCHARGE NOTE NURSING/CASE MANAGEMENT/SOCIAL WORK - PATIENT PORTAL LINK FT
You can access the FollowMyHealth Patient Portal offered by [facility] by registering at the following website: [email or web address]. By joining [facility]’s FollowMyHealth portal, you will also be able to view your health information using other applications (apps) compatible with our system.

## 2021-08-30 NOTE — H&P ADULT - NSHPLABSRESULTS_GEN_ALL_CORE
< from: Cardiac Cath Lab (08.24.21 @ 14:46) >    CORONARY VESSELS: The coronary circulation is right dominant.  LM:   --  LM: Angiography showed minor luminal irregularities with no flow  limiting lesions.  LAD:   --  Proximal LAD: There was a discrete 80 % stenosis. There was ISMAEL  grade 3 flow through the vessel (brisk flow).  --  Mid LAD: The distal vessel was supplied by moderate collaterals from  the RCA. There was a tubular 90 % stenosis in the middle third of the  vessel segment. There was ISMAEL grade 3 flow through the vessel (brisk  flow). In a second lesion, there was a 100 % stenosis in the distal third  of the vessel segment. There was ISMAEL grade 0 flow through the vessel (no  flow).  --  D2: The vessel was small sized. There was a discrete 90 % stenosis in  the proximal third of the vessel segment. There was ISMAEL grade 3 flow  through the vessel (brisk flow).  CX:   --  Mid circumflex: There was a 100 % stenosis just after OM2. There  was ISMAEL grade 0 flow through the vessel (no flow).  --  OM1: The distal vessel was supplied by moderate collaterals from the  first obtuse marginal (bridging collaterals). There was a 100 % stenosis  in the proximal third of the vessel segment. There was ISMAEL grade 0 flow  through the vessel (no flow).  --  OM2: The vessel was very small sized.  RCA:   --  Proximal RCA: There was a tubular 20 % stenosis.  --  Mid RCA: There was a discrete 60 % stenosis in the middle third of the  vessel segment. There was ISMAEL grade 3 flow through the vessel (brisk  flow). In a second lesion, there was a discrete 80 % stenosis in the  distal third of the vesselsegment. There was ISMAEL grade 3 flow through  the vessel (brisk flow).  --  Distal RCA: The vessel was moderately ectatic. There was a tubular 30 %  stenosis in the distal third of the vessel segment.  --  RPDA: Angiography showed minor luminal irregularities with no flow  limiting lesions.  --  RPLS: There was a discrete 80 % stenosis in the proximal third of the  vessel segment.  COMPLICATIONS: There were no complications.  DIAGNOSTIC RECOMMENDATIONS: The patient's anti-anginal regimen should be  further intensified. Consultation should be obtained for surgical opinion  and coronary artery bypass grafting.  Prepared and signed by  Jus Nader, M.D.    < end of copied text >

## 2021-08-30 NOTE — H&P ADULT - PROBLEM SELECTOR PLAN 1
Assessment:  - the patient presented for R shoulder pain which was thought to be related to ACS  - Patient currently denies all chest pain, however does endorse anginal chest pain with running occasionally. Never had work up previously. Hx of smoking  - EKG shows Lead II, III, AvF 1mm IAN and V6 T wave inversion, which is similar to previous EKG 2 days ago, no changes  - Troponins negative (18->20)  - patient unlikely to have active ACS at this time due to neg tropx x2 and unchanged EKG. Patient currently sitting at bedside comfortable with no symptoms of ACS. Not in acute distress.  - d-dimer neg - unlikely PE    Plan:  - due to hx of active smoking and EKG findings, and findings of anginal chest pain, would recommend stress test for evaluation of ischemic heart disease, consider cath if stress test is positive  - cardiology consult - I spoke to the Cardio PA, who spoke to Dr. Mendez. There is no indication for emergent cath at this time as per Dr. Mendez  - obtain echo  - no indication for heparin drip at this time because the patient does not have active chest pain and neg troponins  - will obtain repeat STAT troponins, repeat EKG tonight - if pos, will start heparin drip  - tele monitoring  - will obtain Lipid profile and A1c for risk stratification

## 2021-08-30 NOTE — H&P ADULT - HISTORY OF PRESENT ILLNESS
40 y/o M with no pmhx current smoker 20 pack year history presented for R shoulder pain started acutely approx a week ago. The patient was lifting heavy objects in his house before onset of pain. Pain started on the R neck and would radiate downward towards his arms. Denies trauma to the area. Denies weakness to the R arm. Pain is sharp like. Worse with movement when abducting his arms and flexion upward. Pain is constant. Denies numbness or tingling. Physical exam at bedside showed reproducible R shoulder/arm pain when the patient's head is extended backward and R arm raised upward.      He is also an active smoker. Patient is admitted for ACS work up after presenting to the ED. Currently denies active chest pain. However he does endorse occasional pressure-like chest pain when he is running. Active smoker of 1ppk/day. Denies hx of HLD, HTN or DM. Reports. Does not see a cardiologist.     Upon ACS work up, s/p LHC which revealed,     Denies hx of surgeries or metal in body. Does not take daily medications.

## 2021-08-30 NOTE — DISCHARGE NOTE NURSING/CASE MANAGEMENT/SOCIAL WORK - NSDCPEFALRISK_GEN_ALL_CORE
For information on Fall & injury Prevention, visit [email or web address]

## 2021-08-30 NOTE — H&P ADULT - ASSESSMENT
40 y/o M with no pmhx active 1 pack day smoker presented for R shoulder pain started acutely. Incidentally found to have mild ST changes. Admitted for ACS work up found to have severe diffuse multivessel CAD, now transferred to [facility] for CABG evaluation with Dr Sow.

## 2021-08-31 DIAGNOSIS — R06.02 SHORTNESS OF BREATH: ICD-10-CM

## 2021-08-31 DIAGNOSIS — M54.12 RADICULOPATHY, CERVICAL REGION: ICD-10-CM

## 2021-08-31 DIAGNOSIS — I25.10 ATHEROSCLEROTIC HEART DISEASE OF NATIVE CORONARY ARTERY WITHOUT ANGINA PECTORIS: ICD-10-CM

## 2021-08-31 DIAGNOSIS — F17.200 NICOTINE DEPENDENCE, UNSPECIFIED, UNCOMPLICATED: ICD-10-CM

## 2021-08-31 PROBLEM — Z82.49 FAMILY HISTORY OF ISCHEMIC HEART DISEASE AND OTHER DISEASES OF THE CIRCULATORY SYSTEM: Chronic | Status: ACTIVE | Noted: 2021-08-21

## 2021-08-31 LAB
A1C WITH ESTIMATED AVERAGE GLUCOSE RESULT: 6.1 % — HIGH (ref 4–5.6)
ALBUMIN SERPL ELPH-MCNC: 4.5 G/DL — SIGNIFICANT CHANGE UP (ref 3.3–5)
ALP SERPL-CCNC: 83 U/L — SIGNIFICANT CHANGE UP (ref 40–120)
ALT FLD-CCNC: 78 U/L — HIGH (ref 10–45)
ANION GAP SERPL CALC-SCNC: 13 MMOL/L — SIGNIFICANT CHANGE UP (ref 5–17)
APPEARANCE UR: CLEAR — SIGNIFICANT CHANGE UP
APTT BLD: 26.7 SEC — LOW (ref 27.5–35.5)
AST SERPL-CCNC: 33 U/L — SIGNIFICANT CHANGE UP (ref 10–40)
BASOPHILS # BLD AUTO: 0.04 K/UL — SIGNIFICANT CHANGE UP (ref 0–0.2)
BASOPHILS NFR BLD AUTO: 0.2 % — SIGNIFICANT CHANGE UP (ref 0–2)
BILIRUB SERPL-MCNC: 0.6 MG/DL — SIGNIFICANT CHANGE UP (ref 0.2–1.2)
BILIRUB UR-MCNC: NEGATIVE — SIGNIFICANT CHANGE UP
BLD GP AB SCN SERPL QL: NEGATIVE — SIGNIFICANT CHANGE UP
BUN SERPL-MCNC: 22 MG/DL — SIGNIFICANT CHANGE UP (ref 7–23)
CALCIUM SERPL-MCNC: 10 MG/DL — SIGNIFICANT CHANGE UP (ref 8.4–10.5)
CHLORIDE SERPL-SCNC: 96 MMOL/L — SIGNIFICANT CHANGE UP (ref 96–108)
CO2 SERPL-SCNC: 26 MMOL/L — SIGNIFICANT CHANGE UP (ref 22–31)
COLOR SPEC: SIGNIFICANT CHANGE UP
COVID-19 SPIKE DOMAIN AB INTERP: POSITIVE
COVID-19 SPIKE DOMAIN ANTIBODY RESULT: >250 U/ML — HIGH
CREAT SERPL-MCNC: 1.15 MG/DL — SIGNIFICANT CHANGE UP (ref 0.5–1.3)
DIFF PNL FLD: NEGATIVE — SIGNIFICANT CHANGE UP
EOSINOPHIL # BLD AUTO: 0.05 K/UL — SIGNIFICANT CHANGE UP (ref 0–0.5)
EOSINOPHIL NFR BLD AUTO: 0.3 % — SIGNIFICANT CHANGE UP (ref 0–6)
ESTIMATED AVERAGE GLUCOSE: 128 MG/DL — HIGH (ref 68–114)
GLUCOSE SERPL-MCNC: 119 MG/DL — HIGH (ref 70–99)
GLUCOSE UR QL: NEGATIVE — SIGNIFICANT CHANGE UP
HCT VFR BLD CALC: 56.3 % — HIGH (ref 39–50)
HGB BLD-MCNC: 18.5 G/DL — HIGH (ref 13–17)
IMM GRANULOCYTES NFR BLD AUTO: 2.3 % — HIGH (ref 0–1.5)
INR BLD: 0.91 RATIO — SIGNIFICANT CHANGE UP (ref 0.88–1.16)
KETONES UR-MCNC: NEGATIVE — SIGNIFICANT CHANGE UP
LEUKOCYTE ESTERASE UR-ACNC: NEGATIVE — SIGNIFICANT CHANGE UP
LYMPHOCYTES # BLD AUTO: 14.4 % — SIGNIFICANT CHANGE UP (ref 13–44)
LYMPHOCYTES # BLD AUTO: 2.85 K/UL — SIGNIFICANT CHANGE UP (ref 1–3.3)
MCHC RBC-ENTMCNC: 29.7 PG — SIGNIFICANT CHANGE UP (ref 27–34)
MCHC RBC-ENTMCNC: 32.9 GM/DL — SIGNIFICANT CHANGE UP (ref 32–36)
MCV RBC AUTO: 90.4 FL — SIGNIFICANT CHANGE UP (ref 80–100)
MONOCYTES # BLD AUTO: 1.16 K/UL — HIGH (ref 0–0.9)
MONOCYTES NFR BLD AUTO: 5.9 % — SIGNIFICANT CHANGE UP (ref 2–14)
MRSA PCR RESULT.: SIGNIFICANT CHANGE UP
NEUTROPHILS # BLD AUTO: 15.21 K/UL — HIGH (ref 1.8–7.4)
NEUTROPHILS NFR BLD AUTO: 76.9 % — SIGNIFICANT CHANGE UP (ref 43–77)
NITRITE UR-MCNC: NEGATIVE — SIGNIFICANT CHANGE UP
NRBC # BLD: 0 /100 WBCS — SIGNIFICANT CHANGE UP (ref 0–0)
NT-PROBNP SERPL-SCNC: 329 PG/ML — HIGH (ref 0–300)
PA ADP PRP-ACNC: 99 PRU — LOW (ref 194–417)
PH UR: 6 — SIGNIFICANT CHANGE UP (ref 5–8)
PLATELET # BLD AUTO: 394 K/UL — SIGNIFICANT CHANGE UP (ref 150–400)
POTASSIUM SERPL-MCNC: 4.6 MMOL/L — SIGNIFICANT CHANGE UP (ref 3.5–5.3)
POTASSIUM SERPL-SCNC: 4.6 MMOL/L — SIGNIFICANT CHANGE UP (ref 3.5–5.3)
PREALB SERPL-MCNC: 42 MG/DL — HIGH (ref 20–40)
PROT SERPL-MCNC: 7.3 G/DL — SIGNIFICANT CHANGE UP (ref 6–8.3)
PROT UR-MCNC: NEGATIVE — SIGNIFICANT CHANGE UP
PROTHROM AB SERPL-ACNC: 11 SEC — SIGNIFICANT CHANGE UP (ref 10.6–13.6)
RBC # BLD: 6.23 M/UL — HIGH (ref 4.2–5.8)
RBC # FLD: 13.6 % — SIGNIFICANT CHANGE UP (ref 10.3–14.5)
RH IG SCN BLD-IMP: POSITIVE — SIGNIFICANT CHANGE UP
S AUREUS DNA NOSE QL NAA+PROBE: SIGNIFICANT CHANGE UP
SARS-COV-2 IGG+IGM SERPL QL IA: >250 U/ML — HIGH
SARS-COV-2 IGG+IGM SERPL QL IA: POSITIVE
SARS-COV-2 RNA SPEC QL NAA+PROBE: SIGNIFICANT CHANGE UP
SODIUM SERPL-SCNC: 135 MMOL/L — SIGNIFICANT CHANGE UP (ref 135–145)
SP GR SPEC: 1.02 — SIGNIFICANT CHANGE UP (ref 1.01–1.02)
T3 SERPL-MCNC: 40 NG/DL — LOW (ref 80–200)
T4 FREE SERPL-MCNC: 1.6 NG/DL — SIGNIFICANT CHANGE UP (ref 0.9–1.8)
TSH SERPL-MCNC: 1.54 UIU/ML — SIGNIFICANT CHANGE UP (ref 0.27–4.2)
UROBILINOGEN FLD QL: NEGATIVE — SIGNIFICANT CHANGE UP
WBC # BLD: 19.77 K/UL — HIGH (ref 3.8–10.5)
WBC # FLD AUTO: 19.77 K/UL — HIGH (ref 3.8–10.5)

## 2021-08-31 PROCEDURE — 93880 EXTRACRANIAL BILAT STUDY: CPT | Mod: 26

## 2021-08-31 PROCEDURE — 93010 ELECTROCARDIOGRAM REPORT: CPT

## 2021-08-31 PROCEDURE — 71250 CT THORAX DX C-: CPT | Mod: 26

## 2021-08-31 PROCEDURE — 99231 SBSQ HOSP IP/OBS SF/LOW 25: CPT

## 2021-08-31 RX ORDER — BUPROPION HYDROCHLORIDE 150 MG/1
150 TABLET, EXTENDED RELEASE ORAL DAILY
Refills: 0 | Status: DISCONTINUED | OUTPATIENT
Start: 2021-08-31 | End: 2021-09-03

## 2021-08-31 RX ADMIN — ATORVASTATIN CALCIUM 40 MILLIGRAM(S): 80 TABLET, FILM COATED ORAL at 22:16

## 2021-08-31 RX ADMIN — Medication 25 MILLIGRAM(S): at 15:02

## 2021-08-31 RX ADMIN — SODIUM CHLORIDE 3 MILLILITER(S): 9 INJECTION INTRAMUSCULAR; INTRAVENOUS; SUBCUTANEOUS at 22:00

## 2021-08-31 RX ADMIN — Medication 81 MILLIGRAM(S): at 13:00

## 2021-08-31 RX ADMIN — Medication 25 MILLIGRAM(S): at 06:28

## 2021-08-31 RX ADMIN — PANTOPRAZOLE SODIUM 40 MILLIGRAM(S): 20 TABLET, DELAYED RELEASE ORAL at 06:29

## 2021-08-31 RX ADMIN — Medication 25 MILLIGRAM(S): at 22:16

## 2021-08-31 RX ADMIN — Medication 2 MILLIGRAM(S): at 06:28

## 2021-08-31 RX ADMIN — ENOXAPARIN SODIUM 40 MILLIGRAM(S): 100 INJECTION SUBCUTANEOUS at 12:58

## 2021-08-31 RX ADMIN — SODIUM CHLORIDE 3 MILLILITER(S): 9 INJECTION INTRAMUSCULAR; INTRAVENOUS; SUBCUTANEOUS at 06:20

## 2021-08-31 RX ADMIN — SODIUM CHLORIDE 3 MILLILITER(S): 9 INJECTION INTRAMUSCULAR; INTRAVENOUS; SUBCUTANEOUS at 15:02

## 2021-08-31 RX ADMIN — BUPROPION HYDROCHLORIDE 150 MILLIGRAM(S): 150 TABLET, EXTENDED RELEASE ORAL at 15:02

## 2021-08-31 NOTE — PROGRESS NOTE ADULT - ASSESSMENT
40 y/o M with no pmhx current smoker 20 pack year history presented for R shoulder pain started acutely approx a week ago. The patient was lifting heavy objects in his house before onset of pain. Pain started on the R neck and would radiate downward towards his arms. Denies trauma to the area. Denies weakness to the R arm. Pain is sharp like. Worse with movement when abducting his arms and flexion upward. Pain is constant. Denies numbness or tingling. Physical exam at bedside showed reproducible R shoulder/arm pain when the patient's head is extended backward and R arm raised upward.      He is also an active smoker. Patient is admitted for ACS work up after presenting to the ED. Currently denies active chest pain. However he does endorse occasional pressure-like chest pain when he is running. Active smoker of 1ppk/day. Denies hx of HLD, HTN or DM. Reports. Does not see a cardiologist.   8/31     Pulm cslt for smoking   chest Ct orderd

## 2021-08-31 NOTE — CONSULT NOTE ADULT - PROBLEM SELECTOR RECOMMENDATION 2
Cardiac cath with multivessel CAD  -Pre-op CABG  -Management per CT surgery Cardiac cath with multivessel CAD  -Pre-op CABG  -Recommend lipid panel  -Management per CT surgery

## 2021-08-31 NOTE — CONSULT NOTE ADULT - ATTENDING COMMENTS
Pt with strong family history of CAD/MI, found to have significant CAD and here for CABG.  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  20+y history of smoking, never diagnosed or treated for any lung disease, sat 98% on RA  Suggest bedside spirometry, check VBG  Would dc steroids in anticipation of CABG  CT chest wo contrast

## 2021-08-31 NOTE — CONSULT NOTE ADULT - PROBLEM SELECTOR RECOMMENDATION 3
Current 1 ppd smoker  -Smoking cessation education provided  -Pt willing to quit  -Nicotine patch offered, refusing at this time

## 2021-08-31 NOTE — CONSULT NOTE ADULT - ASSESSMENT
40 y/o M with 20 pack year smoking hx (currently 1ppd), presented to [facility] with R shoulder/neck pain that radiates down his arm x1 week. At the time of admission, denied active chest pain but notes occasional chest pressure with activity. Cardiac cath with diffuse multivessel CAD, now here for CABG evaluation with CT surgery. Pulmonary called to consult as patient is a current smoker. O2 sats 98% on RA.

## 2021-08-31 NOTE — PROGRESS NOTE ADULT - SUBJECTIVE AND OBJECTIVE BOX
VITAL SIGNS    Telemetry:      sr  70  Vital Signs Last 24 Hrs  T(C): 36.7 (21 @ 14:57), Max: 37 (21 @ 05:04)  T(F): 98 (21 @ 14:57), Max: 98.6 (21 @ 05:04)  HR: 73 (21 @ 14:57) (73 - 99)  BP: 98/61 (21 @ 14:57) (98/61 - 118/79)  RR: 18 (21 @ 14:57) (18 - 20)  SpO2: 97% (21 @ 14:57) (95% - 99%)                   Daily Height in cm: 154.43 (30 Aug 2021 17:35)    Daily Weight in k.9 (31 Aug 2021 08:32)      Bilirubin Total, Serum: 0.6 mg/dL ( @ 07:30)    CAPILLARY BLOOD GLUCOSE                    PHYSICAL EXAM  s  "  No chest pain  No sob"  Neurology: alert and oriented x 3, moves all extremities with no defecits  CV :  RRR  Lungs:   CTA B/L  Abdomen: soft, nontender, nondistended, positive bowel sounds,   Extremities:     no edema   no calf tenderness

## 2021-08-31 NOTE — CONSULT NOTE ADULT - PROBLEM SELECTOR RECOMMENDATION 4
Steroids/pain control per primary team Noted to be on steroids for ? cervical radiculopathy  -MRI cervical pain noted  -Would recommend stopping steroids at this time Noted to be on steroids for cervical radiculopathy  -MRI cervical pain noted  -Would recommend stopping steroids at this time

## 2021-08-31 NOTE — CONSULT NOTE ADULT - SUBJECTIVE AND OBJECTIVE BOX
PULMONARY CONSULT    HPI: 40 y/o M with 20 pack year smoking hx (currently 1ppd), presented to [facility] with R shoulder/neck pain that radiates down his arm x1 week. At the time of admission, denied active chest pain but notes occasional chest pressure with activity.   Upon ACS work up, s/p LHC which revealed,     Denies hx of surgeries or metal in body. Does not take daily medications. (30 Aug 2021 18:38)      BRIEF HOSPITAL COURSE: ***    PAST MEDICAL & SURGICAL HISTORY:  Family history of hypertension in mother    No significant past surgical history      Allergies    No Known Allergies    Intolerances      FAMILY HISTORY:  Family history of diabetes mellitus (DM) (Mother)    FH: HTN (hypertension) (Mother)    FH: rheumatoid arthritis (Mother)      Social history:     Review of Systems:  CONSTITUTIONAL: No fever, chills, or fatigue  EYES: No eye pain, visual disturbances, or discharge  ENMT:  No difficulty hearing, tinnitus, vertigo; No sinus or throat pain  NECK: No pain or stiffness  RESPIRATORY: Per above  CARDIOVASCULAR: No chest pain, palpitations, dizziness, or leg swelling  GASTROINTESTINAL: No abdominal or epigastric pain. No nausea, vomiting, or hematemesis; No diarrhea or constipation. No melena or hematochezia.  GENITOURINARY: No dysuria, frequency, hematuria, or incontinence  NEUROLOGICAL: No headaches, memory loss, loss of strength, numbness, or tremors  SKIN: No itching, burning, rashes, or lesions   MUSCULOSKELETAL: No joint pain or swelling; No muscle, back, or extremity pain  PSYCHIATRIC: No depression, anxiety, mood swings, or difficulty sleeping      Medications:  MEDICATIONS  (STANDING):  aspirin  chewable 81 milliGRAM(s) Oral daily  atorvastatin 40 milliGRAM(s) Oral at bedtime  buPROPion XL (24-Hour) . 150 milliGRAM(s) Oral daily  dexAMETHasone     Tablet 2 milliGRAM(s) Oral two times a day  enoxaparin Injectable 40 milliGRAM(s) SubCutaneous daily  metoprolol tartrate 25 milliGRAM(s) Oral every 8 hours  pantoprazole    Tablet 40 milliGRAM(s) Oral before breakfast  senna 2 Tablet(s) Oral at bedtime  sodium chloride 0.9% lock flush 3 milliLiter(s) IV Push every 8 hours    MEDICATIONS  (PRN):            Vital Signs Last 24 Hrs  T(C): 37 (31 Aug 2021 05:04), Max: 37 (30 Aug 2021 12:26)  T(F): 98.6 (31 Aug 2021 05:04), Max: 98.6 (30 Aug 2021 12:26)  HR: 99 (31 Aug 2021 05:04) (80 - 99)  BP: 101/68 (31 Aug 2021 05:04) (101/68 - 118/79)  BP(mean): --  RR: 19 (31 Aug 2021 05:04) (18 - 20)  SpO2: 99% (31 Aug 2021 05:04) (95% - 99%)            08-30 @ 07:01  -  08-31 @ 07:00  --------------------------------------------------------  IN: 200 mL / OUT: 0 mL / NET: 200 mL          LABS:                        18.5   19.77 )-----------( 394      ( 31 Aug 2021 07:30 )             56.3     08-31    135  |  96  |  22  ----------------------------<  119<H>  4.6   |  26  |  1.15    Ca    10.0      31 Aug 2021 07:30  Phos  3.7     08-30  Mg     2.30     08-30    TPro  7.3  /  Alb  4.5  /  TBili  0.6  /  DBili  x   /  AST  33  /  ALT  78<H>  /  AlkPhos  83  08-31          CAPILLARY BLOOD GLUCOSE        PT/INR - ( 31 Aug 2021 07:30 )   PT: 11.0 sec;   INR: 0.91 ratio         PTT - ( 31 Aug 2021 07:30 )  PTT:26.7 sec      Serum Pro-Brain Natriuretic Peptide: 329 pg/mL (08-31-21 @ 07:30)              CULTURES: (if applicable)                    Physical Examination:    General: No acute distress.      HEENT: Pupils equal, reactive to light.  Symmetric.    PULM: Clear to auscultation bilaterally, no significant sputum production    CVS: S1, S2    ABD: Soft, nondistended, nontender, normoactive bowel sounds, no masses    EXT: No edema, nontender    SKIN: Warm and well perfused, no rashes noted.    NEURO: Alert, oriented, interactive, nonfocal    RADIOLOGY REVIEWED  CXR:    CT chest:    TTE:    < from: Cardiac Cath Lab (08.24.21 @ 14:46) >  PROCEDURE:  --  Right coronary angiography.  --  Left coronary angiography.  TECHNIQUE: The risks and alternatives of the procedures and conscious  sedation were explained to the patient and informed consent was obtained.  Cardiac catheterization performed electively.  Right radial artery access. Local anesthetic given. The puncture site was  infiltrated with 2 % lidocaine. A 5 Fr. Radial Glidesheath Slender was  inserted in the vessel. Right coronary artery angiography. The vessel was  injected utilizing a 4 Fr JR 4.0 catheter. Left coronary artery  angiography. The vessel was injected utilizing a 4 Fr. JL 3.5 catheter.  RADIATION EXPOSURE: 4.9 min.  CONTRAST GIVEN: 38 ml Optiray.  MEDICATIONS GIVEN: Midazolam, 1 mg, IV. Verapamil (Isoptin, Calan, Covera),  2.5mg, IA. Nitroglycerin, 50 mcg, intracoronary. Heparin, 4000 units, IV.  2% Lidocaine, 3 ml, subcutaneously. 0.9% Normal saline, 40 ml, IV.  VENTRICLES: No left ventriculogram was performed.  CORONARY VESSELS: The coronary circulation is right dominant.  LM:   --  LM: Angiography showed minor luminal irregularities with no flow  limiting lesions.  LAD:   --  Proximal LAD: There was a discrete 80 % stenosis. There was ISMAEL  grade 3 flow through the vessel (brisk flow).  --  Mid LAD: The distal vessel was supplied by moderate collaterals from  the RCA. There was a tubular 90 % stenosis in the middle third of the  vessel segment. There was ISMAEL grade 3 flow through the vessel (brisk  flow). In a second lesion, there was a 100 % stenosis in the distal third  of the vessel segment. There was ISMAEL grade 0 flow through the vessel (no  flow).  --  D2: The vessel was small sized. There was a discrete 90 % stenosis in  the proximal third of the vessel segment. There was ISMAEL grade 3 flow  through the vessel (brisk flow).  CX:   --  Mid circumflex: There was a 100 % stenosis just after OM2. There  was ISMAEL grade 0 flow through the vessel (no flow).  --  OM1: The distal vessel was supplied by moderate collaterals from the  first obtuse marginal (bridging collaterals). There was a 100 % stenosis  in the proximal third of the vessel segment. There was ISMAEL grade 0 flow  through the vessel (no flow).  --  OM2: The vessel was very small sized.  RCA:   --  Proximal RCA: There was a tubular 20 % stenosis.  --  Mid RCA: There was a discrete 60 % stenosis in the middle third of the  vessel segment. There was ISMAEL grade 3 flow through the vessel (brisk  flow). In a second lesion, there was a discrete 80 % stenosis in the  distal third of the vesselsegment. There was ISMAEL grade 3 flow through  the vessel (brisk flow).  --  Distal RCA: The vessel was moderately ectatic. There was a tubular 30 %  stenosis in the distal third of the vessel segment.  --  RPDA: Angiography showed minor luminal irregularities with no flow  limiting lesions.  --  RPLS: There was a discrete 80 % stenosis in the proximal third of the  vessel segment.  COMPLICATIONS: There were no complications.  DIAGNOSTIC RECOMMENDATIONS: The patient's anti-anginal regimen should be  further intensified. Consultation should be obtained for surgical opinion  and coronary artery bypass grafting.    < end of copied text >     PULMONARY CONSULT    HPI: 40 y/o M with 20 pack year smoking hx (currently 1ppd), presented to [facility] with R shoulder/neck pain that radiates down his arm x1 week. At the time of admission, denied active chest pain but notes occasional chest pressure with activity. Cardiac cath with diffuse multivessel CAD, now here for CABG evaluation with CT surgery. Pulmonary called to consult as patient is a current smoker.     PAST MEDICAL & SURGICAL HISTORY:  Family history of hypertension in mother    No significant past surgical history    No Known Allergies    FAMILY HISTORY:  Family history of diabetes mellitus (DM) (Mother)    FH: HTN (hypertension) (Mother)    FH: rheumatoid arthritis (Mother)    Social history: current smoker    Review of Systems:  CONSTITUTIONAL: No fever, chills, or fatigue  EYES: No eye pain, visual disturbances, or discharge  ENMT:  No difficulty hearing, tinnitus, vertigo; No sinus or throat pain  NECK: No pain or stiffness  RESPIRATORY: Per above  CARDIOVASCULAR: No chest pain, palpitations, dizziness, or leg swelling  GASTROINTESTINAL: No abdominal or epigastric pain. No nausea, vomiting, or hematemesis; No diarrhea or constipation. No melena or hematochezia.  GENITOURINARY: No dysuria, frequency, hematuria, or incontinence  NEUROLOGICAL: No headaches, memory loss, loss of strength, numbness, or tremors  SKIN: No itching, burning, rashes, or lesions   MUSCULOSKELETAL: No joint pain or swelling; No muscle, back, or extremity pain  PSYCHIATRIC: No depression, anxiety, mood swings, or difficulty sleeping    Medications:  MEDICATIONS  (STANDING):  aspirin  chewable 81 milliGRAM(s) Oral daily  atorvastatin 40 milliGRAM(s) Oral at bedtime  buPROPion XL (24-Hour) . 150 milliGRAM(s) Oral daily  dexAMETHasone     Tablet 2 milliGRAM(s) Oral two times a day  enoxaparin Injectable 40 milliGRAM(s) SubCutaneous daily  metoprolol tartrate 25 milliGRAM(s) Oral every 8 hours  pantoprazole    Tablet 40 milliGRAM(s) Oral before breakfast  senna 2 Tablet(s) Oral at bedtime  sodium chloride 0.9% lock flush 3 milliLiter(s) IV Push every 8 hours    Vital Signs Last 24 Hrs  T(C): 37 (31 Aug 2021 05:04), Max: 37 (30 Aug 2021 12:26)  T(F): 98.6 (31 Aug 2021 05:04), Max: 98.6 (30 Aug 2021 12:26)  HR: 99 (31 Aug 2021 05:04) (80 - 99)  BP: 101/68 (31 Aug 2021 05:04) (101/68 - 118/79)  BP(mean): --  RR: 19 (31 Aug 2021 05:04) (18 - 20)  SpO2: 99% (31 Aug 2021 05:04) (95% - 99%)    08-30 @ 07:01  -  08-31 @ 07:00  --------------------------------------------------------  IN: 200 mL / OUT: 0 mL / NET: 200 mL    LABS:                        18.5   19.77 )-----------( 394      ( 31 Aug 2021 07:30 )             56.3     08-31    135  |  96  |  22  ----------------------------<  119<H>  4.6   |  26  |  1.15    Ca    10.0      31 Aug 2021 07:30  Phos  3.7     08-30  Mg     2.30     08-30    TPro  7.3  /  Alb  4.5  /  TBili  0.6  /  DBili  x   /  AST  33  /  ALT  78<H>  /  AlkPhos  83  08-31    PT/INR - ( 31 Aug 2021 07:30 )   PT: 11.0 sec;   INR: 0.91 ratio    PTT - ( 31 Aug 2021 07:30 )  PTT:26.7 sec    Serum Pro-Brain Natriuretic Peptide: 329 pg/mL (08-31-21 @ 07:30)    Physical Examination:    General: No acute distress.      HEENT: Pupils equal, reactive to light.  Symmetric.    PULM: Clear to auscultation bilaterally, no significant sputum production    CVS: S1, S2    ABD: Soft, nondistended, nontender, normoactive bowel sounds, no masses    EXT: No edema, nontender    SKIN: Warm and well perfused, no rashes noted.    NEURO: Alert, oriented, interactive, nonfocal    RADIOLOGY REVIEWED  CXR: 8/21 grossly clear    TTE: < from: Transthoracic Echocardiogram (08.24.21 @ 06:30) >  DIMENSIONS:  Dimensions:     Normal Values:  LA:     2.8 cm    2.0 - 4.0 cm  Ao:     3.5 cm    2.0 - 3.8 cm  SEPTUM: 0.7 cm    0.6 - 1.2 cm  PWT:    0.9 cm    0.6 - 1.1 cm  LVIDd:  4.7 cm    3.0 - 5.6 cm  LVIDs:  3.4 cm    1.8 - 4.0 cm  Derived Variables:  LVMI: 61 g/m2  RWT: 0.38  Fractional short: 28 %  Ejection Fraction (Teicholtz): 54 %  ------------------------------------------------------------------------  OBSERVATIONS:  Mitral Valve: Normal mitral valve. Minimal mitral  regurgitation.  Aortic Root: Normal aortic root.  Aortic Valve: Normal trileaflet aortic valve.  Left Atrium: Normal left atrium.  Left Ventricle: Endocardium not well visualized; grossly  low normal left ventricular systolic function. Normal left  ventricular internal dimensions and wall thicknesses.  Right Heart: Normal right atrium. Normal right ventricular  size and function. Normal tricuspid valve. Minimal  tricuspid regurgitation. Normal pulmonic valve.  Pericardium/PleuraNormal pericardium with no pericardial  effusion.  ------------------------------------------------------------------------  CONCLUSIONS:  1. Normal mitral valve. Minimal mitral regurgitation.  2. Normal left ventricular internal dimensions and wall  thicknesses.  3. Endocardium not well visualized; grossly low normal left  ventricular systolic function.  4. Normal right ventricular size and function.    < end of copied text >      < from: Cardiac Cath Lab (08.24.21 @ 14:46) >  PROCEDURE:  --  Right coronary angiography.  --  Left coronary angiography.  TECHNIQUE: The risks and alternatives of the procedures and conscious  sedation were explained to the patient and informed consent was obtained.  Cardiac catheterization performed electively.  Right radial artery access. Local anesthetic given. The puncture site was  infiltrated with 2 % lidocaine. A 5 Fr. Radial Glidesheath Slender was  inserted in the vessel. Right coronary artery angiography. The vessel was  injected utilizing a 4 Fr JR 4.0 catheter. Left coronary artery  angiography. The vessel was injected utilizing a 4 Fr. JL 3.5 catheter.  RADIATION EXPOSURE: 4.9 min.  CONTRAST GIVEN: 38 ml Optiray.  MEDICATIONS GIVEN: Midazolam, 1 mg, IV. Verapamil (Isoptin, Calan, Covera),  2.5mg, IA. Nitroglycerin, 50 mcg, intracoronary. Heparin, 4000 units, IV.  2% Lidocaine, 3 ml, subcutaneously. 0.9% Normal saline, 40 ml, IV.  VENTRICLES: No left ventriculogram was performed.  CORONARY VESSELS: The coronary circulation is right dominant.  LM:   --  LM: Angiography showed minor luminal irregularities with no flow  limiting lesions.  LAD:   --  Proximal LAD: There was a discrete 80 % stenosis. There was ISMAEL  grade 3 flow through the vessel (brisk flow).  --  Mid LAD: The distal vessel was supplied by moderate collaterals from  the RCA. There was a tubular 90 % stenosis in the middle third of the  vessel segment. There was ISMAEL grade 3 flow through the vessel (brisk  flow). In a second lesion, there was a 100 % stenosis in the distal third  of the vessel segment. There was ISMAEL grade 0 flow through the vessel (no  flow).  --  D2: The vessel was small sized. There was a discrete 90 % stenosis in  the proximal third of the vessel segment. There was ISMAEL grade 3 flow  through the vessel (brisk flow).  CX:   --  Mid circumflex: There was a 100 % stenosis just after OM2. There  was ISMAEL grade 0 flow through the vessel (no flow).  --  OM1: The distal vessel was supplied by moderate collaterals from the  first obtuse marginal (bridging collaterals). There was a 100 % stenosis  in the proximal third of the vessel segment. There was ISMAEL grade 0 flow  through the vessel (no flow).  --  OM2: The vessel was very small sized.  RCA:   --  Proximal RCA: There was a tubular 20 % stenosis.  --  Mid RCA: There was a discrete 60 % stenosis in the middle third of the  vessel segment. There was ISMAEL grade 3 flow through the vessel (brisk  flow). In a second lesion, there was a discrete 80 % stenosis in the  distal third of the vesselsegment. There was ISMAEL grade 3 flow through  the vessel (brisk flow).  --  Distal RCA: The vessel was moderately ectatic. There was a tubular 30 %  stenosis in the distal third of the vessel segment.  --  RPDA: Angiography showed minor luminal irregularities with no flow  limiting lesions.  --  RPLS: There was a discrete 80 % stenosis in the proximal third of the  vessel segment.  COMPLICATIONS: There were no complications.  DIAGNOSTIC RECOMMENDATIONS: The patient's anti-anginal regimen should be  further intensified. Consultation should be obtained for surgical opinion  and coronary artery bypass grafting.    < end of copied text >     PULMONARY CONSULT    HPI: 40 y/o M with 20 pack year smoking hx (currently 1ppd), presented to [facility] with R shoulder/neck pain that radiates down his arm x1 week. At the time of admission, denied active chest pain but notes occasional chest pressure with activity. Cardiac cath with diffuse multivessel CAD, now here for CABG evaluation with CT surgery. Pulmonary called to consult as patient is a current smoker. Pt notes that he has smoker 1PPD for about 20 years, has tried to quit multiple times but has been unsuccessful. Lives in [place], but has been in [place] since July visiting family. Denies any hx of lung disease, inhaler use. Has never seen a pulmonologist. S/p COVID vaccine x2 doses (last dose 8/13/21). Reports occasional cough with tan sputum production and intermittent SOB that is exacerbated laying flat. Otherwise, denies any current chest pain, pleuritic chest pain, fever, chills. O2 sats 98% on RA.     PAST MEDICAL & SURGICAL HISTORY:  Family history of hypertension in mother    No significant past surgical history    No Known Allergies    FAMILY HISTORY:  Family history of diabetes mellitus (DM) (Mother)    FH: HTN (hypertension) (Mother)    FH: rheumatoid arthritis (Mother)    Social history: current smoker (1ppd x 20 years)    Review of Systems:  CONSTITUTIONAL: No fever, chills, or fatigue  EYES: No eye pain, visual disturbances, or discharge  ENMT:  No difficulty hearing, tinnitus, vertigo; No sinus or throat pain  NECK: No pain or stiffness  RESPIRATORY: Per above  CARDIOVASCULAR: Per above  GASTROINTESTINAL: No abdominal or epigastric pain. No nausea, vomiting, or hematemesis; No diarrhea or constipation. No melena or hematochezia.  GENITOURINARY: No dysuria, frequency, hematuria, or incontinence  NEUROLOGICAL: No headaches, memory loss, loss of strength, numbness, or tremors  SKIN: No itching, burning, rashes, or lesions   MUSCULOSKELETAL: No joint pain or swelling; No muscle, back, or extremity pain  PSYCHIATRIC: No depression, anxiety, mood swings, or difficulty sleeping    Medications:  MEDICATIONS  (STANDING):  aspirin  chewable 81 milliGRAM(s) Oral daily  atorvastatin 40 milliGRAM(s) Oral at bedtime  buPROPion XL (24-Hour) . 150 milliGRAM(s) Oral daily  dexAMETHasone     Tablet 2 milliGRAM(s) Oral two times a day  enoxaparin Injectable 40 milliGRAM(s) SubCutaneous daily  metoprolol tartrate 25 milliGRAM(s) Oral every 8 hours  pantoprazole    Tablet 40 milliGRAM(s) Oral before breakfast  senna 2 Tablet(s) Oral at bedtime  sodium chloride 0.9% lock flush 3 milliLiter(s) IV Push every 8 hours    Vital Signs Last 24 Hrs  T(C): 37 (31 Aug 2021 05:04), Max: 37 (30 Aug 2021 12:26)  T(F): 98.6 (31 Aug 2021 05:04), Max: 98.6 (30 Aug 2021 12:26)  HR: 99 (31 Aug 2021 05:04) (80 - 99)  BP: 101/68 (31 Aug 2021 05:04) (101/68 - 118/79)  BP(mean): --  RR: 19 (31 Aug 2021 05:04) (18 - 20)  SpO2: 99% (31 Aug 2021 05:04) (95% - 99%)    08-30 @ 07:01  -  08-31 @ 07:00  --------------------------------------------------------  IN: 200 mL / OUT: 0 mL / NET: 200 mL    LABS:                        18.5   19.77 )-----------( 394      ( 31 Aug 2021 07:30 )             56.3     08-31    135  |  96  |  22  ----------------------------<  119<H>  4.6   |  26  |  1.15    Ca    10.0      31 Aug 2021 07:30  Phos  3.7     08-30  Mg     2.30     08-30    TPro  7.3  /  Alb  4.5  /  TBili  0.6  /  DBili  x   /  AST  33  /  ALT  78<H>  /  AlkPhos  83  08-31    PT/INR - ( 31 Aug 2021 07:30 )   PT: 11.0 sec;   INR: 0.91 ratio    PTT - ( 31 Aug 2021 07:30 )  PTT:26.7 sec    Serum Pro-Brain Natriuretic Peptide: 329 pg/mL (08-31-21 @ 07:30)    Physical Examination:    General: No acute distress.      HEENT: Pupils equal, reactive to light.  Symmetric.    PULM: Clear to auscultation bilaterally, no significant sputum production    CVS: RRR    ABD: Soft, nondistended, nontender, normoactive bowel sounds, no masses    EXT: No edema, nontender    SKIN: Warm and well perfused, no rashes noted.    NEURO: Alert, oriented, interactive, nonfocal    RADIOLOGY REVIEWED  CXR: 8/21 grossly clear    TTE: < from: Transthoracic Echocardiogram (08.24.21 @ 06:30) >  DIMENSIONS:  Dimensions:     Normal Values:  LA:     2.8 cm    2.0 - 4.0 cm  Ao:     3.5 cm    2.0 - 3.8 cm  SEPTUM: 0.7 cm    0.6 - 1.2 cm  PWT:    0.9 cm    0.6 - 1.1 cm  LVIDd:  4.7 cm    3.0 - 5.6 cm  LVIDs:  3.4 cm    1.8 - 4.0 cm  Derived Variables:  LVMI: 61 g/m2  RWT: 0.38  Fractional short: 28 %  Ejection Fraction (Teicholtz): 54 %  ------------------------------------------------------------------------  OBSERVATIONS:  Mitral Valve: Normal mitral valve. Minimal mitral  regurgitation.  Aortic Root: Normal aortic root.  Aortic Valve: Normal trileaflet aortic valve.  Left Atrium: Normal left atrium.  Left Ventricle: Endocardium not well visualized; grossly  low normal left ventricular systolic function. Normal left  ventricular internal dimensions and wall thicknesses.  Right Heart: Normal right atrium. Normal right ventricular  size and function. Normal tricuspid valve. Minimal  tricuspid regurgitation. Normal pulmonic valve.  Pericardium/PleuraNormal pericardium with no pericardial  effusion.  ------------------------------------------------------------------------  CONCLUSIONS:  1. Normal mitral valve. Minimal mitral regurgitation.  2. Normal left ventricular internal dimensions and wall  thicknesses.  3. Endocardium not well visualized; grossly low normal left  ventricular systolic function.  4. Normal right ventricular size and function.    < end of copied text >      < from: Cardiac Cath Lab (08.24.21 @ 14:46) >  PROCEDURE:  --  Right coronary angiography.  --  Left coronary angiography.  TECHNIQUE: The risks and alternatives of the procedures and conscious  sedation were explained to the patient and informed consent was obtained.  Cardiac catheterization performed electively.  Right radial artery access. Local anesthetic given. The puncture site was  infiltrated with 2 % lidocaine. A 5 Fr. Radial Glidesheath Slender was  inserted in the vessel. Right coronary artery angiography. The vessel was  injected utilizing a 4 Fr JR 4.0 catheter. Left coronary artery  angiography. The vessel was injected utilizing a 4 Fr. JL 3.5 catheter.  RADIATION EXPOSURE: 4.9 min.  CONTRAST GIVEN: 38 ml Optiray.  MEDICATIONS GIVEN: Midazolam, 1 mg, IV. Verapamil (Isoptin, Calan, Covera),  2.5mg, IA. Nitroglycerin, 50 mcg, intracoronary. Heparin, 4000 units, IV.  2% Lidocaine, 3 ml, subcutaneously. 0.9% Normal saline, 40 ml, IV.  VENTRICLES: No left ventriculogram was performed.  CORONARY VESSELS: The coronary circulation is right dominant.  LM:   --  LM: Angiography showed minor luminal irregularities with no flow  limiting lesions.  LAD:   --  Proximal LAD: There was a discrete 80 % stenosis. There was ISMAEL  grade 3 flow through the vessel (brisk flow).  --  Mid LAD: The distal vessel was supplied by moderate collaterals from  the RCA. There was a tubular 90 % stenosis in the middle third of the  vessel segment. There was ISMAEL grade 3 flow through the vessel (brisk  flow). In a second lesion, there was a 100 % stenosis in the distal third  of the vessel segment. There was ISMAEL grade 0 flow through the vessel (no  flow).  --  D2: The vessel was small sized. There was a discrete 90 % stenosis in  the proximal third of the vessel segment. There was ISMAEL grade 3 flow  through the vessel (brisk flow).  CX:   --  Mid circumflex: There was a 100 % stenosis just after OM2. There  was ISMAEL grade 0 flow through the vessel (no flow).  --  OM1: The distal vessel was supplied by moderate collaterals from the  first obtuse marginal (bridging collaterals). There was a 100 % stenosis  in the proximal third of the vessel segment. There was ISMAEL grade 0 flow  through the vessel (no flow).  --  OM2: The vessel was very small sized.  RCA:   --  Proximal RCA: There was a tubular 20 % stenosis.  --  Mid RCA: There was a discrete 60 % stenosis in the middle third of the  vessel segment. There was ISMAEL grade 3 flow through the vessel (brisk  flow). In a second lesion, there was a discrete 80 % stenosis in the  distal third of the vesselsegment. There was ISMAEL grade 3 flow through  the vessel (brisk flow).  --  Distal RCA: The vessel was moderately ectatic. There was a tubular 30 %  stenosis in the distal third of the vessel segment.  --  RPDA: Angiography showed minor luminal irregularities with no flow  limiting lesions.  --  RPLS: There was a discrete 80 % stenosis in the proximal third of the  vessel segment.  COMPLICATIONS: There were no complications.  DIAGNOSTIC RECOMMENDATIONS: The patient's anti-anginal regimen should be  further intensified. Consultation should be obtained for surgical opinion  and coronary artery bypass grafting.    < end of copied text >     PULMONARY CONSULT    HPI: 42 y/o M with 20 pack year smoking hx (currently 1ppd), cervical radiculopathy, presented to [facility] with R shoulder/neck pain that radiates down his arm x1 week. At the time of admission, denied active chest pain but notes occasional chest pressure with activity. Cardiac cath with diffuse multivessel CAD, now here for CABG evaluation with CT surgery. Pulmonary called to consult as patient is a current smoker. Pt notes that he has smoker 1PPD for about 20 years, has tried to quit multiple times but has been unsuccessful. Lives in [place], but has been in [place] since July visiting family. Denies any hx of lung disease, inhaler use. Has never seen a pulmonologist. S/p COVID vaccine x2 doses (last dose 8/13/21). Reports occasional cough with tan sputum production and intermittent SOB that is exacerbated laying flat. Otherwise, denies any current chest pain, pleuritic chest pain, fever, chills. O2 sats 98% on RA.     PAST MEDICAL & SURGICAL HISTORY:  Family history of hypertension in mother    No significant past surgical history    No Known Allergies    FAMILY HISTORY:  Family history of diabetes mellitus (DM) (Mother)    FH: HTN (hypertension) (Mother)    FH: rheumatoid arthritis (Mother)    Social history: current smoker (1ppd x 20 years)    Review of Systems:  CONSTITUTIONAL: No fever, chills, or fatigue  EYES: No eye pain, visual disturbances, or discharge  ENMT:  No difficulty hearing, tinnitus, vertigo; No sinus or throat pain  NECK: No pain or stiffness  RESPIRATORY: Per above  CARDIOVASCULAR: Per above  GASTROINTESTINAL: No abdominal or epigastric pain. No nausea, vomiting, or hematemesis; No diarrhea or constipation. No melena or hematochezia.  GENITOURINARY: No dysuria, frequency, hematuria, or incontinence  NEUROLOGICAL: No headaches, memory loss, loss of strength, numbness, or tremors  SKIN: No itching, burning, rashes, or lesions   MUSCULOSKELETAL: No joint pain or swelling; No muscle, back, or extremity pain  PSYCHIATRIC: No depression, anxiety, mood swings, or difficulty sleeping    Medications:  MEDICATIONS  (STANDING):  aspirin  chewable 81 milliGRAM(s) Oral daily  atorvastatin 40 milliGRAM(s) Oral at bedtime  buPROPion XL (24-Hour) . 150 milliGRAM(s) Oral daily  dexAMETHasone     Tablet 2 milliGRAM(s) Oral two times a day  enoxaparin Injectable 40 milliGRAM(s) SubCutaneous daily  metoprolol tartrate 25 milliGRAM(s) Oral every 8 hours  pantoprazole    Tablet 40 milliGRAM(s) Oral before breakfast  senna 2 Tablet(s) Oral at bedtime  sodium chloride 0.9% lock flush 3 milliLiter(s) IV Push every 8 hours    Vital Signs Last 24 Hrs  T(C): 37 (31 Aug 2021 05:04), Max: 37 (30 Aug 2021 12:26)  T(F): 98.6 (31 Aug 2021 05:04), Max: 98.6 (30 Aug 2021 12:26)  HR: 99 (31 Aug 2021 05:04) (80 - 99)  BP: 101/68 (31 Aug 2021 05:04) (101/68 - 118/79)  BP(mean): --  RR: 19 (31 Aug 2021 05:04) (18 - 20)  SpO2: 99% (31 Aug 2021 05:04) (95% - 99%)    08-30 @ 07:01  -  08-31 @ 07:00  --------------------------------------------------------  IN: 200 mL / OUT: 0 mL / NET: 200 mL    LABS:                        18.5   19.77 )-----------( 394      ( 31 Aug 2021 07:30 )             56.3     08-31    135  |  96  |  22  ----------------------------<  119<H>  4.6   |  26  |  1.15    Ca    10.0      31 Aug 2021 07:30  Phos  3.7     08-30  Mg     2.30     08-30    TPro  7.3  /  Alb  4.5  /  TBili  0.6  /  DBili  x   /  AST  33  /  ALT  78<H>  /  AlkPhos  83  08-31    PT/INR - ( 31 Aug 2021 07:30 )   PT: 11.0 sec;   INR: 0.91 ratio    PTT - ( 31 Aug 2021 07:30 )  PTT:26.7 sec    Serum Pro-Brain Natriuretic Peptide: 329 pg/mL (08-31-21 @ 07:30)    Physical Examination:    General: No acute distress.      HEENT: Pupils equal, reactive to light.  Symmetric.    PULM: Clear to auscultation bilaterally, no significant sputum production    CVS: RRR    ABD: Soft, nondistended, nontender, normoactive bowel sounds, no masses    EXT: No edema, nontender    SKIN: Warm and well perfused, no rashes noted.    NEURO: Alert, oriented, interactive, nonfocal    RADIOLOGY REVIEWED  CXR: 8/21 grossly clear    TTE: < from: Transthoracic Echocardiogram (08.24.21 @ 06:30) >  DIMENSIONS:  Dimensions:     Normal Values:  LA:     2.8 cm    2.0 - 4.0 cm  Ao:     3.5 cm    2.0 - 3.8 cm  SEPTUM: 0.7 cm    0.6 - 1.2 cm  PWT:    0.9 cm    0.6 - 1.1 cm  LVIDd:  4.7 cm    3.0 - 5.6 cm  LVIDs:  3.4 cm    1.8 - 4.0 cm  Derived Variables:  LVMI: 61 g/m2  RWT: 0.38  Fractional short: 28 %  Ejection Fraction (Teicholtz): 54 %  ------------------------------------------------------------------------  OBSERVATIONS:  Mitral Valve: Normal mitral valve. Minimal mitral  regurgitation.  Aortic Root: Normal aortic root.  Aortic Valve: Normal trileaflet aortic valve.  Left Atrium: Normal left atrium.  Left Ventricle: Endocardium not well visualized; grossly  low normal left ventricular systolic function. Normal left  ventricular internal dimensions and wall thicknesses.  Right Heart: Normal right atrium. Normal right ventricular  size and function. Normal tricuspid valve. Minimal  tricuspid regurgitation. Normal pulmonic valve.  Pericardium/PleuraNormal pericardium with no pericardial  effusion.  ------------------------------------------------------------------------  CONCLUSIONS:  1. Normal mitral valve. Minimal mitral regurgitation.  2. Normal left ventricular internal dimensions and wall  thicknesses.  3. Endocardium not well visualized; grossly low normal left  ventricular systolic function.  4. Normal right ventricular size and function.    < end of copied text >      < from: Cardiac Cath Lab (08.24.21 @ 14:46) >  PROCEDURE:  --  Right coronary angiography.  --  Left coronary angiography.  TECHNIQUE: The risks and alternatives of the procedures and conscious  sedation were explained to the patient and informed consent was obtained.  Cardiac catheterization performed electively.  Right radial artery access. Local anesthetic given. The puncture site was  infiltrated with 2 % lidocaine. A 5 Fr. Radial Glidesheath Slender was  inserted in the vessel. Right coronary artery angiography. The vessel was  injected utilizing a 4 Fr JR 4.0 catheter. Left coronary artery  angiography. The vessel was injected utilizing a 4 Fr. JL 3.5 catheter.  RADIATION EXPOSURE: 4.9 min.  CONTRAST GIVEN: 38 ml Optiray.  MEDICATIONS GIVEN: Midazolam, 1 mg, IV. Verapamil (Isoptin, Calan, Covera),  2.5mg, IA. Nitroglycerin, 50 mcg, intracoronary. Heparin, 4000 units, IV.  2% Lidocaine, 3 ml, subcutaneously. 0.9% Normal saline, 40 ml, IV.  VENTRICLES: No left ventriculogram was performed.  CORONARY VESSELS: The coronary circulation is right dominant.  LM:   --  LM: Angiography showed minor luminal irregularities with no flow  limiting lesions.  LAD:   --  Proximal LAD: There was a discrete 80 % stenosis. There was ISMAEL  grade 3 flow through the vessel (brisk flow).  --  Mid LAD: The distal vessel was supplied by moderate collaterals from  the RCA. There was a tubular 90 % stenosis in the middle third of the  vessel segment. There was ISMAEL grade 3 flow through the vessel (brisk  flow). In a second lesion, there was a 100 % stenosis in the distal third  of the vessel segment. There was ISMAEL grade 0 flow through the vessel (no  flow).  --  D2: The vessel was small sized. There was a discrete 90 % stenosis in  the proximal third of the vessel segment. There was ISMAEL grade 3 flow  through the vessel (brisk flow).  CX:   --  Mid circumflex: There was a 100 % stenosis just after OM2. There  was ISMAEL grade 0 flow through the vessel (no flow).  --  OM1: The distal vessel was supplied by moderate collaterals from the  first obtuse marginal (bridging collaterals). There was a 100 % stenosis  in the proximal third of the vessel segment. There was ISMAEL grade 0 flow  through the vessel (no flow).  --  OM2: The vessel was very small sized.  RCA:   --  Proximal RCA: There was a tubular 20 % stenosis.  --  Mid RCA: There was a discrete 60 % stenosis in the middle third of the  vessel segment. There was ISMAEL grade 3 flow through the vessel (brisk  flow). In a second lesion, there was a discrete 80 % stenosis in the  distal third of the vesselsegment. There was ISMAEL grade 3 flow through  the vessel (brisk flow).  --  Distal RCA: The vessel was moderately ectatic. There was a tubular 30 %  stenosis in the distal third of the vessel segment.  --  RPDA: Angiography showed minor luminal irregularities with no flow  limiting lesions.  --  RPLS: There was a discrete 80 % stenosis in the proximal third of the  vessel segment.  COMPLICATIONS: There were no complications.  DIAGNOSTIC RECOMMENDATIONS: The patient's anti-anginal regimen should be  further intensified. Consultation should be obtained for surgical opinion  and coronary artery bypass grafting.    < end of copied text >     PULMONARY CONSULT    HPI: 42 y/o M with 20 pack year smoking hx (currently 1ppd), cervical radiculopathy, presented to [facility] with R shoulder/neck pain that radiates down his arm x1 week. At the time of admission, denied active chest pain but notes occasional chest pressure with activity. Cardiac cath with diffuse multivessel CAD, now here for CABG evaluation with CT surgery. Pulmonary called to consult as patient is a current smoker. Pt notes that he has smoker 1PPD for about 20 years, has tried to quit multiple times but has been unsuccessful. Lives in [place], but has been in [place] since July visiting family. Denies any hx of lung disease, inhaler use. Has never seen a pulmonologist. S/p COVID vaccine x2 doses (last dose 8/13/21). Reports occasional cough with tan sputum production and intermittent SOB that is exacerbated laying flat. Otherwise, denies any current chest pain, pleuritic chest pain, fever, chills. O2 sats 98% on RA.     PAST MEDICAL & SURGICAL HISTORY:  No significant PMH    surgery after broken nose from accident    No Known Allergies    FAMILY HISTORY:  Family history of diabetes mellitus (DM) (Mother)    FH: HTN (hypertension) (Mother)        rheumatoid arthritis (Mother)        Strong family history of CAD?MI and early death, uncles, cousins, grandfather    Social history: current smoker (1ppd x 20 years)    Review of Systems:  CONSTITUTIONAL: No fever, chills, or fatigue  EYES: No eye pain, visual disturbances, or discharge  ENMT:  No difficulty hearing, tinnitus, vertigo; No sinus or throat pain  NECK: No pain or stiffness  RESPIRATORY: Per above  CARDIOVASCULAR: Per above  GASTROINTESTINAL: No abdominal or epigastric pain. No nausea, vomiting, or hematemesis; No diarrhea or constipation. No melena or hematochezia.  GENITOURINARY: No dysuria, frequency, hematuria, or incontinence  NEUROLOGICAL: No headaches, memory loss, loss of strength, numbness, or tremors  SKIN: No itching, burning, rashes, or lesions   MUSCULOSKELETAL: rt shoulder pain  PSYCHIATRIC: No depression, anxiety, mood swings, or difficulty sleeping    Medications:  MEDICATIONS  (STANDING):  aspirin  chewable 81 milliGRAM(s) Oral daily  atorvastatin 40 milliGRAM(s) Oral at bedtime  buPROPion XL (24-Hour) . 150 milliGRAM(s) Oral daily  dexAMETHasone     Tablet 2 milliGRAM(s) Oral two times a day  enoxaparin Injectable 40 milliGRAM(s) SubCutaneous daily  metoprolol tartrate 25 milliGRAM(s) Oral every 8 hours  pantoprazole    Tablet 40 milliGRAM(s) Oral before breakfast  senna 2 Tablet(s) Oral at bedtime  sodium chloride 0.9% lock flush 3 milliLiter(s) IV Push every 8 hours    Vital Signs Last 24 Hrs  T(C): 37 (31 Aug 2021 05:04), Max: 37 (30 Aug 2021 12:26)  T(F): 98.6 (31 Aug 2021 05:04), Max: 98.6 (30 Aug 2021 12:26)  HR: 99 (31 Aug 2021 05:04) (80 - 99)  BP: 101/68 (31 Aug 2021 05:04) (101/68 - 118/79)  BP(mean): --  RR: 19 (31 Aug 2021 05:04) (18 - 20)  SpO2: 99% (31 Aug 2021 05:04) (95% - 99%)    08-30 @ 07:01  -  08-31 @ 07:00  --------------------------------------------------------  IN: 200 mL / OUT: 0 mL / NET: 200 mL    LABS:                        18.5   19.77 )-----------( 394      ( 31 Aug 2021 07:30 )             56.3     08-31    135  |  96  |  22  ----------------------------<  119<H>  4.6   |  26  |  1.15    Ca    10.0      31 Aug 2021 07:30  Phos  3.7     08-30  Mg     2.30     08-30    TPro  7.3  /  Alb  4.5  /  TBili  0.6  /  DBili  x   /  AST  33  /  ALT  78<H>  /  AlkPhos  83  08-31    PT/INR - ( 31 Aug 2021 07:30 )   PT: 11.0 sec;   INR: 0.91 ratio    PTT - ( 31 Aug 2021 07:30 )  PTT:26.7 sec    Serum Pro-Brain Natriuretic Peptide: 329 pg/mL (08-31-21 @ 07:30)    Physical Examination:    General: No acute distress.      HEENT: Pupils equal, reactive to light.  Symmetric.    PULM: Clear to auscultation bilaterally, no significant sputum production    CVS: RRR    ABD: Soft, nondistended, nontender, normoactive bowel sounds, no masses    EXT: No edema, nontender    SKIN: Warm and well perfused, no rashes noted.    NEURO: Alert, oriented, interactive, nonfocal    RADIOLOGY REVIEWED  CXR: 8/21 grossly clear    TTE: < from: Transthoracic Echocardiogram (08.24.21 @ 06:30) >  DIMENSIONS:  Dimensions:     Normal Values:  LA:     2.8 cm    2.0 - 4.0 cm  Ao:     3.5 cm    2.0 - 3.8 cm  SEPTUM: 0.7 cm    0.6 - 1.2 cm  PWT:    0.9 cm    0.6 - 1.1 cm  LVIDd:  4.7 cm    3.0 - 5.6 cm  LVIDs:  3.4 cm    1.8 - 4.0 cm  Derived Variables:  LVMI: 61 g/m2  RWT: 0.38  Fractional short: 28 %  Ejection Fraction (Teicholtz): 54 %  ------------------------------------------------------------------------  OBSERVATIONS:  Mitral Valve: Normal mitral valve. Minimal mitral  regurgitation.  Aortic Root: Normal aortic root.  Aortic Valve: Normal trileaflet aortic valve.  Left Atrium: Normal left atrium.  Left Ventricle: Endocardium not well visualized; grossly  low normal left ventricular systolic function. Normal left  ventricular internal dimensions and wall thicknesses.  Right Heart: Normal right atrium. Normal right ventricular  size and function. Normal tricuspid valve. Minimal  tricuspid regurgitation. Normal pulmonic valve.  Pericardium/PleuraNormal pericardium with no pericardial  effusion.  ------------------------------------------------------------------------  CONCLUSIONS:  1. Normal mitral valve. Minimal mitral regurgitation.  2. Normal left ventricular internal dimensions and wall  thicknesses.  3. Endocardium not well visualized; grossly low normal left  ventricular systolic function.  4. Normal right ventricular size and function.    < end of copied text >      < from: Cardiac Cath Lab (08.24.21 @ 14:46) >  PROCEDURE:  --  Right coronary angiography.  --  Left coronary angiography.  TECHNIQUE: The risks and alternatives of the procedures and conscious  sedation were explained to the patient and informed consent was obtained.  Cardiac catheterization performed electively.  Right radial artery access. Local anesthetic given. The puncture site was  infiltrated with 2 % lidocaine. A 5 Fr. Radial Glidesheath Slender was  inserted in the vessel. Right coronary artery angiography. The vessel was  injected utilizing a 4 Fr JR 4.0 catheter. Left coronary artery  angiography. The vessel was injected utilizing a 4 Fr. JL 3.5 catheter.  RADIATION EXPOSURE: 4.9 min.  CONTRAST GIVEN: 38 ml Optiray.  MEDICATIONS GIVEN: Midazolam, 1 mg, IV. Verapamil (Isoptin, Calan, Covera),  2.5mg, IA. Nitroglycerin, 50 mcg, intracoronary. Heparin, 4000 units, IV.  2% Lidocaine, 3 ml, subcutaneously. 0.9% Normal saline, 40 ml, IV.  VENTRICLES: No left ventriculogram was performed.  CORONARY VESSELS: The coronary circulation is right dominant.  LM:   --  LM: Angiography showed minor luminal irregularities with no flow  limiting lesions.  LAD:   --  Proximal LAD: There was a discrete 80 % stenosis. There was ISMAEL  grade 3 flow through the vessel (brisk flow).  --  Mid LAD: The distal vessel was supplied by moderate collaterals from  the RCA. There was a tubular 90 % stenosis in the middle third of the  vessel segment. There was ISMAEL grade 3 flow through the vessel (brisk  flow). In a second lesion, there was a 100 % stenosis in the distal third  of the vessel segment. There was ISMAEL grade 0 flow through the vessel (no  flow).  --  D2: The vessel was small sized. There was a discrete 90 % stenosis in  the proximal third of the vessel segment. There was ISMAEL grade 3 flow  through the vessel (brisk flow).  CX:   --  Mid circumflex: There was a 100 % stenosis just after OM2. There  was ISMAEL grade 0 flow through the vessel (no flow).  --  OM1: The distal vessel was supplied by moderate collaterals from the  first obtuse marginal (bridging collaterals). There was a 100 % stenosis  in the proximal third of the vessel segment. There was ISMAEL grade 0 flow  through the vessel (no flow).  --  OM2: The vessel was very small sized.  RCA:   --  Proximal RCA: There was a tubular 20 % stenosis.  --  Mid RCA: There was a discrete 60 % stenosis in the middle third of the  vessel segment. There was ISMAEL grade 3 flow through the vessel (brisk  flow). In a second lesion, there was a discrete 80 % stenosis in the  distal third of the vesselsegment. There was ISMAEL grade 3 flow through  the vessel (brisk flow).  --  Distal RCA: The vessel was moderately ectatic. There was a tubular 30 %  stenosis in the distal third of the vessel segment.  --  RPDA: Angiography showed minor luminal irregularities with no flow  limiting lesions.  --  RPLS: There was a discrete 80 % stenosis in the proximal third of the  vessel segment.  COMPLICATIONS: There were no complications.  DIAGNOSTIC RECOMMENDATIONS: The patient's anti-anginal regimen should be  further intensified. Consultation should be obtained for surgical opinion  and coronary artery bypass grafting.    < end of copied text >

## 2021-08-31 NOTE — CONSULT NOTE ADULT - PROBLEM SELECTOR RECOMMENDATION 9
Appears to be associated with exertion, but also noted to occur when laying flat  -Possibly 2nd to extensive CAD, but also may be 2nd to underlying lung disease/EAN  -No complaints of SOB at this time  -Normoxic  -Would check baseline VBG  -F/u spirometry as part of pre-op workup  -Eventual outpt PFTs  -Can consider outpatient PSG to r/o EAN Appears to be associated with exertion, but also noted to occur when laying flat  -Possibly 2nd to extensive CAD, but also may be 2nd to underlying lung disease/EAN  -No complaints of SOB at this time  -Normoxic  -Would check baseline VBG   -Check bedside spirometry  -Eventual outpt PFTs  -Can consider outpatient PSG to r/o EAN Appears to be associated with exertion, but also noted to occur when laying flat  -Possibly 2nd to extensive CAD, but also may be 2nd to underlying lung disease/EAN  -No complaints of SOB at this time  -Normoxic  -Would check baseline VBG   -Check bedside spirometry  -Check CXR  -Eventual outpt PFTs  -Can consider outpatient PSG to r/o EAN Appears to be associated with exertion, but also noted to occur when laying flat  -Possibly 2nd to extensive CAD, but also may be 2nd to underlying lung disease/EAN  -No complaints of SOB at this time  -Normoxic  -Would check baseline VBG   -Check bedside spirometry  -Eventual outpt full PFTs  -Can consider outpatient PSG to r/o EAN

## 2021-09-01 DIAGNOSIS — J43.9 EMPHYSEMA, UNSPECIFIED: ICD-10-CM

## 2021-09-01 DIAGNOSIS — M47.812 SPONDYLOSIS WITHOUT MYELOPATHY OR RADICULOPATHY, CERVICAL REGION: ICD-10-CM

## 2021-09-01 LAB
ANION GAP SERPL CALC-SCNC: 14 MMOL/L — SIGNIFICANT CHANGE UP (ref 5–17)
BASE EXCESS BLDA CALC-SCNC: 2.3 MMOL/L — SIGNIFICANT CHANGE UP (ref -2–3)
BASE EXCESS BLDV CALC-SCNC: 2.2 MMOL/L — HIGH (ref -2–2)
BLD GP AB SCN SERPL QL: NEGATIVE — SIGNIFICANT CHANGE UP
BUN SERPL-MCNC: 24 MG/DL — HIGH (ref 7–23)
CALCIUM SERPL-MCNC: 9.3 MG/DL — SIGNIFICANT CHANGE UP (ref 8.4–10.5)
CHLORIDE SERPL-SCNC: 98 MMOL/L — SIGNIFICANT CHANGE UP (ref 96–108)
CO2 BLDA-SCNC: 28 MMOL/L — HIGH (ref 19–24)
CO2 BLDV-SCNC: 29 MMOL/L — HIGH (ref 22–26)
CO2 SERPL-SCNC: 24 MMOL/L — SIGNIFICANT CHANGE UP (ref 22–31)
CREAT SERPL-MCNC: 1.02 MG/DL — SIGNIFICANT CHANGE UP (ref 0.5–1.3)
FIBRINOGEN PPP-MCNC: 295 MG/DL — SIGNIFICANT CHANGE UP (ref 290–520)
GAS PNL BLDA: SIGNIFICANT CHANGE UP
GAS PNL BLDV: SIGNIFICANT CHANGE UP
GLUCOSE SERPL-MCNC: 115 MG/DL — HIGH (ref 70–99)
HCO3 BLDA-SCNC: 26 MMOL/L — SIGNIFICANT CHANGE UP (ref 21–28)
HCO3 BLDV-SCNC: 27 MMOL/L — SIGNIFICANT CHANGE UP (ref 22–29)
HCT VFR BLD CALC: 53.2 % — HIGH (ref 39–50)
HGB BLD-MCNC: 17.4 G/DL — HIGH (ref 13–17)
HOROWITZ INDEX BLDA+IHG-RTO: 21 — SIGNIFICANT CHANGE UP
MCHC RBC-ENTMCNC: 29.5 PG — SIGNIFICANT CHANGE UP (ref 27–34)
MCHC RBC-ENTMCNC: 32.7 GM/DL — SIGNIFICANT CHANGE UP (ref 32–36)
MCV RBC AUTO: 90.2 FL — SIGNIFICANT CHANGE UP (ref 80–100)
NRBC # BLD: 0 /100 WBCS — SIGNIFICANT CHANGE UP (ref 0–0)
PCO2 BLDA: 39 MMHG — SIGNIFICANT CHANGE UP (ref 35–48)
PCO2 BLDV: 43 MMHG — SIGNIFICANT CHANGE UP (ref 42–55)
PH BLDA: 7.44 — SIGNIFICANT CHANGE UP (ref 7.35–7.45)
PH BLDV: 7.41 — SIGNIFICANT CHANGE UP (ref 7.32–7.43)
PLATELET # BLD AUTO: 317 K/UL — SIGNIFICANT CHANGE UP (ref 150–400)
PO2 BLDA: 97 MMHG — SIGNIFICANT CHANGE UP (ref 83–108)
PO2 BLDV: 48 MMHG — HIGH (ref 25–45)
POTASSIUM SERPL-MCNC: 4.1 MMOL/L — SIGNIFICANT CHANGE UP (ref 3.5–5.3)
POTASSIUM SERPL-SCNC: 4.1 MMOL/L — SIGNIFICANT CHANGE UP (ref 3.5–5.3)
RBC # BLD: 5.9 M/UL — HIGH (ref 4.2–5.8)
RBC # FLD: 13.3 % — SIGNIFICANT CHANGE UP (ref 10.3–14.5)
RH IG SCN BLD-IMP: POSITIVE — SIGNIFICANT CHANGE UP
SAO2 % BLDA: 98.6 % — HIGH (ref 94–98)
SAO2 % BLDV: 81.4 % — SIGNIFICANT CHANGE UP (ref 67–88)
SODIUM SERPL-SCNC: 136 MMOL/L — SIGNIFICANT CHANGE UP (ref 135–145)
WBC # BLD: 21.84 K/UL — HIGH (ref 3.8–10.5)
WBC # FLD AUTO: 21.84 K/UL — HIGH (ref 3.8–10.5)

## 2021-09-01 PROCEDURE — 99231 SBSQ HOSP IP/OBS SF/LOW 25: CPT

## 2021-09-01 PROCEDURE — 94010 BREATHING CAPACITY TEST: CPT | Mod: 26

## 2021-09-01 RX ORDER — ENOXAPARIN SODIUM 100 MG/ML
70 INJECTION SUBCUTANEOUS
Refills: 0 | Status: DISCONTINUED | OUTPATIENT
Start: 2021-09-01 | End: 2021-09-02

## 2021-09-01 RX ADMIN — ATORVASTATIN CALCIUM 40 MILLIGRAM(S): 80 TABLET, FILM COATED ORAL at 21:24

## 2021-09-01 RX ADMIN — ENOXAPARIN SODIUM 70 MILLIGRAM(S): 100 INJECTION SUBCUTANEOUS at 17:13

## 2021-09-01 RX ADMIN — BUPROPION HYDROCHLORIDE 150 MILLIGRAM(S): 150 TABLET, EXTENDED RELEASE ORAL at 13:11

## 2021-09-01 RX ADMIN — Medication 25 MILLIGRAM(S): at 05:52

## 2021-09-01 RX ADMIN — Medication 25 MILLIGRAM(S): at 13:11

## 2021-09-01 RX ADMIN — Medication 81 MILLIGRAM(S): at 13:11

## 2021-09-01 RX ADMIN — ENOXAPARIN SODIUM 70 MILLIGRAM(S): 100 INJECTION SUBCUTANEOUS at 07:54

## 2021-09-01 RX ADMIN — PANTOPRAZOLE SODIUM 40 MILLIGRAM(S): 20 TABLET, DELAYED RELEASE ORAL at 07:16

## 2021-09-01 RX ADMIN — SODIUM CHLORIDE 3 MILLILITER(S): 9 INJECTION INTRAMUSCULAR; INTRAVENOUS; SUBCUTANEOUS at 21:13

## 2021-09-01 RX ADMIN — SODIUM CHLORIDE 3 MILLILITER(S): 9 INJECTION INTRAMUSCULAR; INTRAVENOUS; SUBCUTANEOUS at 06:00

## 2021-09-01 RX ADMIN — SODIUM CHLORIDE 3 MILLILITER(S): 9 INJECTION INTRAMUSCULAR; INTRAVENOUS; SUBCUTANEOUS at 13:12

## 2021-09-01 NOTE — PROGRESS NOTE ADULT - PROBLEM SELECTOR PLAN 2
CT chest with paraseptal emphysema  -Not currently exacerbated  -Normoxic  -VBG noted   -F/u bedside spirometry   -Eventual outpt PFTs CT chest with paraseptal emphysema  -Not currently exacerbated  -Normoxic  -VBG noted   -Bedside spirometry suggestive of moderate restriction  -Eventual outpt PFTs CT chest with paraseptal emphysema  -Not currently exacerbated  -Normoxic  -VBG noted   -Eventual outpt PFTs

## 2021-09-01 NOTE — PROGRESS NOTE ADULT - PROBLEM SELECTOR PLAN 1
Appears to be associated with exertion, but also noted to occur when laying flat  -Possibly 2nd to extensive CAD, but also may be 2nd to underlying lung disease/EAN  -CT chest with paraseptal emphysema  -VBG noted, normal pH with no CO2 retention   -No complaints of SOB at this time  -Normoxic  -F/u bedside spirometry  -Eventual full PFTs outpt   -Can consider outpatient PSG to r/o EAN Appears to be associated with exertion, but also noted to occur when laying flat  -Possibly 2nd to extensive CAD, but also may be 2nd to underlying lung disease/ENA  -CT chest with paraseptal emphysema  -VBG noted, normal pH with no CO2 retention   -No complaints of SOB at this time  -Normoxic  -Bedside spirometry suggestive of moderate restriction  -Eventual full PFTs outpt   -Can consider outpatient PSG to r/o EAN Appears to be associated with exertion, but also noted to occur when laying flat  -Possibly 2nd to extensive CAD, but also may be 2nd to underlying lung disease/EAN  -CT chest with paraseptal emphysema  -VBG noted, normal pH with no CO2 retention   -No complaints of SOB at this time  -Normoxic  -Eventual full PFTs outpt   -Can consider outpatient PSG to r/o EAN

## 2021-09-01 NOTE — PROGRESS NOTE ADULT - ASSESSMENT
42 y/o M with no pmhx current smoker 20 pack year history presented for R shoulder pain started acutely approx a week ago. The patient was lifting heavy objects in his house before onset of pain. Pain started on the R neck and would radiate downward towards his arms. Denies trauma to the area. Denies weakness to the R arm. Pain is sharp like. Worse with movement when abducting his arms and flexion upward. Pain is constant. Denies numbness or tingling. Physical exam at bedside showed reproducible R shoulder/arm pain when the patient's head is extended backward and R arm raised upward. He is also an active smoker. Patient is admitted for ACS work up after presenting to the ED. Currently denies active chest pain. However he does endorse occasional pressure-like chest pain when he is running. Active smoker of 1ppk/day. Denies hx of HLD, HTN or DM. Reports. Does not see a cardiologist.     Hospital Course:   8/31 Pulmonary Consult called for h/o active smoker.  Chest CT revealed: Paraseptal emphysema. 3 mm right middlelobe calcified nodule. No pleural effusion. No central endobronchial lesion.  9/1 Leukocytosis --> likely from prior Prednisone use currently off.  RA ABG revealed: pH, Arterial: 7.44 / pCO2, Arterial: 39 mmHg / pO2, Arterial: 97 mmHg / HCO3, Arterial: 26 mmol/L / Base Excess, Arterial: 2.3 mmol/L / Oxygen Saturation, Arterial: 98.6 % / Total CO2, Arterial: 28 mmol/L /  FIO2, Arterial: 21.  Patient cleared for Pulmonary standpoint for CABG recommend Bronchodilators for pre op wheezing PRN. Pre op cardiac surgery work up in progress.   Plan for OR with Dr. Sow on Friday 9/3

## 2021-09-01 NOTE — PROGRESS NOTE ADULT - SUBJECTIVE AND OBJECTIVE BOX
Follow-up Pulm Progress Note    No new respiratory events overnight.  OOB to chair  O2 sats 97% on RA  Denies SOB/CP.     Medications:  MEDICATIONS  (STANDING):  aspirin  chewable 81 milliGRAM(s) Oral daily  atorvastatin 40 milliGRAM(s) Oral at bedtime  buPROPion XL (24-Hour) . 150 milliGRAM(s) Oral daily  enoxaparin Injectable 70 milliGRAM(s) SubCutaneous two times a day  metoprolol tartrate 25 milliGRAM(s) Oral every 8 hours  pantoprazole    Tablet 40 milliGRAM(s) Oral before breakfast  senna 2 Tablet(s) Oral at bedtime  sodium chloride 0.9% lock flush 3 milliLiter(s) IV Push every 8 hours    Vital Signs Last 24 Hrs  T(C): 36.7 (01 Sep 2021 11:35), Max: 36.7 (31 Aug 2021 14:57)  T(F): 98.1 (01 Sep 2021 11:35), Max: 98.1 (01 Sep 2021 11:35)  HR: 73 (01 Sep 2021 11:35) (71 - 73)  BP: 108/71 (01 Sep 2021 11:35) (98/61 - 121/80)  BP(mean): --  RR: 18 (01 Sep 2021 11:35) (18 - 18)  SpO2: 97% (01 Sep 2021 11:35) (97% - 98%)    ABG - ( 01 Sep 2021 09:04 )  pH, Arterial: 7.44  pH, Blood: x     /  pCO2: 39    /  pO2: 97    / HCO3: 26    / Base Excess: 2.3   /  SaO2: 98.6      VBG pH 7.41  @ 07:31    VBG pCO2 43  @ 07:31    VBG O2 sat 81.4  @ 07:31    VBG lactate --  @ 07:31       @ 07:01  -   @ 07:00  --------------------------------------------------------  IN: 720 mL / OUT: 0 mL / NET: 720 mL    LABS:                        17.4   21.84 )-----------( 317      ( 01 Sep 2021 07:16 )             53.2         136  |  98  |  24<H>  ----------------------------<  115<H>  4.1   |  24  |  1.02    Ca    9.3      01 Sep 2021 07:18    TPro  7.3  /  Alb  4.5  /  TBili  0.6  /  DBili  x   /  AST  33  /  ALT  78<H>  /  AlkPhos  83      PT/INR - ( 31 Aug 2021 07:30 )   PT: 11.0 sec;   INR: 0.91 ratio    PTT - ( 31 Aug 2021 07:30 )  PTT:26.7 sec  Urinalysis Basic - ( 31 Aug 2021 11:21 )    Color: Light Yellow / Appearance: Clear / S.020 / pH: x  Gluc: x / Ketone: Negative  / Bili: Negative / Urobili: Negative   Blood: x / Protein: Negative / Nitrite: Negative   Leuk Esterase: Negative / RBC: x / WBC x   Sq Epi: x / Non Sq Epi: x / Bacteria: x    Serum Pro-Brain Natriuretic Peptide: 329 pg/mL (21 @ 07:30)    Physical Examination:  PULM: Clear to auscultation bilaterally, no significant sputum production  CVS: RRR     RADIOLOGY REVIEWED  CT chest: < from: CT Chest No Cont (21 @ 13:41) >  FINDINGS:    Tubes/Lines: None.    Lungs, airways and pleura: Paraseptal emphysema. 3 mm right middlelobe calcified nodule. No pleural effusion. No central endobronchial lesion.    Mediastinum: Heart size is normal. No pericardial effusion. Coronary artery calcifications. A few small mediastinal lymph nodes. The thyroid gland is normal.    Upper Abdomen: The upper abdomen is unremarkable.    Bones And Soft Tissues: The bones are unremarkable.  The soft tissues are unremarkable.      IMPRESSION:    1.  Paraseptal emphysema.  2.  Coronary artery calcifications.    < end of copied text >    TTE: < from: Transthoracic Echocardiogram (21 @ 06:30) >  DIMENSIONS:  Dimensions:     Normal Values:  LA:     2.8 cm    2.0 - 4.0 cm  Ao:     3.5 cm    2.0 - 3.8 cm  SEPTUM: 0.7 cm    0.6 - 1.2 cm  PWT:    0.9 cm    0.6 - 1.1 cm  LVIDd:  4.7 cm    3.0 - 5.6 cm  LVIDs:  3.4 cm    1.8 - 4.0 cm  Derived Variables:  LVMI: 61 g/m2  RWT: 0.38  Fractional short: 28 %  Ejection Fraction (Teicholtz): 54 %  ------------------------------------------------------------------------  OBSERVATIONS:  Mitral Valve: Normal mitral valve. Minimal mitral  regurgitation.  Aortic Root: Normal aortic root.  Aortic Valve: Normal trileaflet aortic valve.  Left Atrium: Normal left atrium.  Left Ventricle: Endocardium not well visualized; grossly  low normal left ventricular systolic function. Normal left  ventricular internal dimensions and wall thicknesses.  Right Heart: Normal right atrium. Normal right ventricular  size and function. Normal tricuspid valve. Minimal  tricuspid regurgitation. Normal pulmonic valve.  Pericardium/PleuraNormal pericardium with no pericardial  effusion.  ------------------------------------------------------------------------  CONCLUSIONS:  1. Normal mitral valve. Minimal mitral regurgitation.  2. Normal left ventricular internal dimensions and wall  thicknesses.  3. Endocardium not well visualized; grossly low normal left  ventricular systolic function.  4. Normal right ventricular size and function.    < end of copied text >     Cardiac Cath:  < from: Cardiac Cath Lab (21 @ 14:46) >  PRIOR DIAGNOSTIC TEST RESULTS: Echocardiography (transthoracic) performed (  2021): 54 % estimated ejection fraction.  PROCEDURE:  --  Right coronary angiography.  --  Left coronary angiography.  TECHNIQUE: The risks and alternatives of the procedures and conscious  sedation were explained to the patient and informed consent was obtained.  Cardiac catheterization performed electively.  Right radial artery access. Local anesthetic given. The puncture site was  infiltrated with 2 % lidocaine. A 5 Fr. Radial Glidesheath Slender was  inserted in the vessel. Right coronary artery angiography. The vessel was  injected utilizing a 4 Fr JR 4.0 catheter. Left coronary artery  angiography. The vessel was injected utilizing a 4 Fr. JL 3.5 catheter.  RADIATION EXPOSURE: 4.9 min.  CONTRAST GIVEN: 38 ml Optiray.  MEDICATIONS GIVEN: Midazolam, 1 mg, IV. Verapamil (Isoptin, Calan, Covera),  2.5mg, IA. Nitroglycerin, 50 mcg, intracoronary. Heparin, 4000 units, IV.  2% Lidocaine, 3 ml, subcutaneously. 0.9% Normal saline, 40 ml, IV.  VENTRICLES: No left ventriculogram was performed.  CORONARY VESSELS: The coronary circulation is right dominant.  LM:   --  LM: Angiography showed minor luminal irregularities with no flow  limiting lesions.  LAD:   --  Proximal LAD: There was a discrete 80 % stenosis. There was ISMAEL  grade 3 flow through the vessel (brisk flow).  --  Mid LAD: The distal vessel was supplied by moderate collaterals from  the RCA. There was a tubular 90 % stenosis in the middle third of the  vessel segment. There was ISMAEL grade 3 flow through the vessel (brisk  flow). In a second lesion, there was a 100 % stenosis in the distal third  of the vessel segment. There was ISMAEL grade 0 flow through the vessel (no  flow).  --  D2: The vessel was small sized. There was a discrete 90 % stenosis in  the proximal third of the vessel segment. There was ISMAEL grade 3 flow  through the vessel (brisk flow).  CX:   --  Mid circumflex: There was a 100 % stenosis just after OM2. There  was ISMAEL grade 0 flow through the vessel (no flow).  --  OM1: The distal vessel was supplied by moderate collaterals from the  first obtuse marginal (bridging collaterals). There was a 100 % stenosis  in the proximal third of the vessel segment. There was ISMAEL grade 0 flow  through the vessel (no flow).  --  OM2: The vessel was very small sized.  RCA:   --  Proximal RCA: There was a tubular 20 % stenosis.  --  Mid RCA: There was a discrete 60 % stenosis in the middle third of the  vessel segment. There was ISMAEL grade 3 flow through the vessel (brisk  flow). In a second lesion, there was a discrete 80 % stenosis in the  distal third of the vesselsegment. There was ISMAEL grade 3 flow through  the vessel (brisk flow).  --  Distal RCA: The vessel was moderately ectatic. There was a tubular 30 %  stenosis in the distal third of the vessel segment.  --  RPDA: Angiography showed minor luminal irregularities with no flow  limiting lesions.  --  RPLS: There was a discrete 80 % stenosis in the proximal third of the  vessel segment.  COMPLICATIONS: There were no complications.  DIAGNOSTIC RECOMMENDATIONS: The patient's anti-anginal regimen should be  further intensified. Consultation should be obtained for surgical opinion  and coronary artery bypass grafting.    < end of copied text >

## 2021-09-01 NOTE — PROGRESS NOTE ADULT - SUBJECTIVE AND OBJECTIVE BOX
VITAL SIGNS    Subjective: "I'm feeling ok." Denies CP, palpitation, SOB, ESPINOZA, HA, dizziness, N/V/D, fever or chills.  No acute event noted overnight.     Telemetry: NSR 60-80     Vital Signs Last 24 Hrs  T(C): 36.7 (21 @ 11:35), Max: 36.7 (21 @ 11:35)  T(F): 98.1 (21 @ 11:35), Max: 98.1 (21 @ 11:35)  HR: 73 (21 @ 11:35) (71 - 73)  BP: 108/71 (21 @ 11:35) (99/63 - 121/80)  RR: 18 (21 @ 11:35) (18 - 18)  SpO2: 97% (21 @ 11:35) (97% - 98%)            @ 07:  -   @ 07:00  --------------------------------------------------------  IN: 720 mL / OUT: 0 mL / NET: 720 mL     @ 07:01  -   @ 17:27  --------------------------------------------------------  IN: 600 mL / OUT: 0 mL / NET: 600 mL    Daily     Daily Weight in k (01 Sep 2021 08:24)       PHYSICAL EXAM    Neurology: alert and oriented x 3, nonfocal, no gross deficits    CV: (+) S1 and S2, No murmurs, rubs, gallops or clicks     Lungs: CTA B/L     Abdomen: soft, nontender, nondistended, positive bowel sounds, (+) Flatus; (+) BM     :  Voiding               Extremities:  B/L LE (-) edema; negative calf tenderness; (+) 2 DP palpable        aspirin chewable 81 milliGRAM(s) Oral daily  atorvastatin 40 milliGRAM(s) Oral at bedtime  buPROPion XL (24-Hour) . 150 milliGRAM(s) Oral daily  enoxaparin Injectable 70 milliGRAM(s) SubCutaneous two times a day  metoprolol tartrate 25 milliGRAM(s) Oral every 8 hours  pantoprazole Tablet 40 milliGRAM(s) Oral before breakfast  senna 2 Tablet(s) Oral at bedtime  sodium chloride 0.9% lock flush 3 milliLiter(s) IV Push every 8 hours    Physical Therapy Rec:   Home  [  ]   Home w/ PT  [ X ]  Rehab  [  ]    Discussed with Cardiothoracic Team at AM rounds.

## 2021-09-01 NOTE — PROGRESS NOTE ADULT - PROBLEM SELECTOR PLAN 1
Pre op Cardiac surgery work up in progress   Continue with ASA 81 mg PO daily   Continue with Lopressor 25 mg PO TID   Continue with Atorvastatin 40 mg PO HS    Started on Lovenox 70 mg SQ BID for ACS   PFT's   Plan for Cardiac Surgery  with Dr. Sow on Friday 9/3

## 2021-09-01 NOTE — PROGRESS NOTE ADULT - ASSESSMENT
42 y/o M with 20 pack year smoking hx (currently 1ppd), presented to [facility] with R shoulder/neck pain that radiates down his arm x1 week. At the time of admission, denied active chest pain but notes occasional chest pressure with activity. Cardiac cath with diffuse multivessel CAD, now here for CABG evaluation with CT surgery. Pulmonary called to consult as patient is a current smoker. O2 sats 98% on RA.

## 2021-09-01 NOTE — PROGRESS NOTE ADULT - NUTRITIONAL ASSESSMENT
Attestation Statements:    Attestation Statements:  Attending supervision statement: I have personally seen and examined the patient.  I fully participated in the care of this patient.  I have made amendments to the documentation where necessary, and agree with the history, physical exam, and plan as documented by the ACP.     Pt with strong family history of CAD/MI, found to have significant CAD and here for CABG.  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  20+y history of smoking, never diagnosed or treated for any lung disease, sat 98% on RA  Suggest bedside spirometry, check VBG  Would dc steroids in anticipa

## 2021-09-02 LAB
ANION GAP SERPL CALC-SCNC: 15 MMOL/L — SIGNIFICANT CHANGE UP (ref 5–17)
BUN SERPL-MCNC: 23 MG/DL — SIGNIFICANT CHANGE UP (ref 7–23)
CALCIUM SERPL-MCNC: 8.7 MG/DL — SIGNIFICANT CHANGE UP (ref 8.4–10.5)
CHLORIDE SERPL-SCNC: 100 MMOL/L — SIGNIFICANT CHANGE UP (ref 96–108)
CO2 SERPL-SCNC: 20 MMOL/L — LOW (ref 22–31)
CREAT SERPL-MCNC: 0.9 MG/DL — SIGNIFICANT CHANGE UP (ref 0.5–1.3)
GLUCOSE SERPL-MCNC: 92 MG/DL — SIGNIFICANT CHANGE UP (ref 70–99)
HCT VFR BLD CALC: 50.6 % — HIGH (ref 39–50)
HGB BLD-MCNC: 16.5 G/DL — SIGNIFICANT CHANGE UP (ref 13–17)
MCHC RBC-ENTMCNC: 29 PG — SIGNIFICANT CHANGE UP (ref 27–34)
MCHC RBC-ENTMCNC: 32.6 GM/DL — SIGNIFICANT CHANGE UP (ref 32–36)
MCV RBC AUTO: 89.1 FL — SIGNIFICANT CHANGE UP (ref 80–100)
NRBC # BLD: 0 /100 WBCS — SIGNIFICANT CHANGE UP (ref 0–0)
PLATELET # BLD AUTO: 281 K/UL — SIGNIFICANT CHANGE UP (ref 150–400)
POTASSIUM SERPL-MCNC: 4.3 MMOL/L — SIGNIFICANT CHANGE UP (ref 3.5–5.3)
POTASSIUM SERPL-SCNC: 4.3 MMOL/L — SIGNIFICANT CHANGE UP (ref 3.5–5.3)
RBC # BLD: 5.68 M/UL — SIGNIFICANT CHANGE UP (ref 4.2–5.8)
RBC # FLD: 13.4 % — SIGNIFICANT CHANGE UP (ref 10.3–14.5)
SARS-COV-2 RNA SPEC QL NAA+PROBE: SIGNIFICANT CHANGE UP
SODIUM SERPL-SCNC: 135 MMOL/L — SIGNIFICANT CHANGE UP (ref 135–145)
WBC # BLD: 18.32 K/UL — HIGH (ref 3.8–10.5)
WBC # FLD AUTO: 18.32 K/UL — HIGH (ref 3.8–10.5)

## 2021-09-02 PROCEDURE — 99232 SBSQ HOSP IP/OBS MODERATE 35: CPT

## 2021-09-02 RX ORDER — CEFUROXIME AXETIL 250 MG
1500 TABLET ORAL ONCE
Refills: 0 | Status: DISCONTINUED | OUTPATIENT
Start: 2021-09-02 | End: 2021-09-03

## 2021-09-02 RX ORDER — CHLORHEXIDINE GLUCONATE 213 G/1000ML
1 SOLUTION TOPICAL ONCE
Refills: 0 | Status: COMPLETED | OUTPATIENT
Start: 2021-09-02 | End: 2021-09-02

## 2021-09-02 RX ORDER — CHLORHEXIDINE GLUCONATE 213 G/1000ML
15 SOLUTION TOPICAL ONCE
Refills: 0 | Status: COMPLETED | OUTPATIENT
Start: 2021-09-02 | End: 2021-09-03

## 2021-09-02 RX ORDER — ENOXAPARIN SODIUM 100 MG/ML
70 INJECTION SUBCUTANEOUS ONCE
Refills: 0 | Status: COMPLETED | OUTPATIENT
Start: 2021-09-02 | End: 2021-09-02

## 2021-09-02 RX ADMIN — Medication 25 MILLIGRAM(S): at 21:34

## 2021-09-02 RX ADMIN — SODIUM CHLORIDE 3 MILLILITER(S): 9 INJECTION INTRAMUSCULAR; INTRAVENOUS; SUBCUTANEOUS at 13:14

## 2021-09-02 RX ADMIN — SODIUM CHLORIDE 3 MILLILITER(S): 9 INJECTION INTRAMUSCULAR; INTRAVENOUS; SUBCUTANEOUS at 21:22

## 2021-09-02 RX ADMIN — ATORVASTATIN CALCIUM 40 MILLIGRAM(S): 80 TABLET, FILM COATED ORAL at 21:34

## 2021-09-02 RX ADMIN — Medication 81 MILLIGRAM(S): at 13:11

## 2021-09-02 RX ADMIN — Medication 25 MILLIGRAM(S): at 05:23

## 2021-09-02 RX ADMIN — ENOXAPARIN SODIUM 70 MILLIGRAM(S): 100 INJECTION SUBCUTANEOUS at 05:23

## 2021-09-02 RX ADMIN — Medication 25 MILLIGRAM(S): at 13:11

## 2021-09-02 RX ADMIN — PANTOPRAZOLE SODIUM 40 MILLIGRAM(S): 20 TABLET, DELAYED RELEASE ORAL at 05:23

## 2021-09-02 RX ADMIN — BUPROPION HYDROCHLORIDE 150 MILLIGRAM(S): 150 TABLET, EXTENDED RELEASE ORAL at 13:11

## 2021-09-02 RX ADMIN — CHLORHEXIDINE GLUCONATE 1 APPLICATION(S): 213 SOLUTION TOPICAL at 21:22

## 2021-09-02 RX ADMIN — SODIUM CHLORIDE 3 MILLILITER(S): 9 INJECTION INTRAMUSCULAR; INTRAVENOUS; SUBCUTANEOUS at 05:19

## 2021-09-02 RX ADMIN — ENOXAPARIN SODIUM 70 MILLIGRAM(S): 100 INJECTION SUBCUTANEOUS at 18:20

## 2021-09-02 NOTE — PROGRESS NOTE ADULT - PROBLEM SELECTOR PLAN 1
Appears to be associated with exertion, but also noted to occur when laying flat  -Possibly 2nd to extensive CAD, but also may be 2nd to underlying lung disease/EAN  -CT chest with paraseptal emphysema  -VBG noted, normal pH with no CO2 retention   -No complaints of SOB at this time  -Normoxic  -Eventual full PFTs outpt   -Can consider outpatient PSG to r/o EAN

## 2021-09-02 NOTE — PROGRESS NOTE ADULT - ASSESSMENT
40 y/o M with 20 pack year smoking hx (currently 1ppd), presented to [facility] with R shoulder/neck pain that radiates down his arm x1 week. At the time of admission, denied active chest pain but notes occasional chest pressure with activity. Cardiac cath with diffuse multivessel CAD, now here for CABG evaluation with CT surgery. Pulmonary called to consult as patient is a current smoker. Currently breathing comfortably on RA.

## 2021-09-02 NOTE — PROGRESS NOTE ADULT - SUBJECTIVE AND OBJECTIVE BOX
Follow-up Pulm Progress Note    No new respiratory events overnight.  Denies SOB/CP.     Medications:  MEDICATIONS  (STANDING):  aspirin  chewable 81 milliGRAM(s) Oral daily  atorvastatin 40 milliGRAM(s) Oral at bedtime  buPROPion XL (24-Hour) . 150 milliGRAM(s) Oral daily  cefuroxime  IVPB 1500 milliGRAM(s) IV Intermittent once  chlorhexidine 0.12% Liquid 15 milliLiter(s) Swish and Spit once  chlorhexidine 4% Liquid 1 Application(s) Topical once  enoxaparin Injectable 70 milliGRAM(s) SubCutaneous once  metoprolol tartrate 25 milliGRAM(s) Oral every 8 hours  pantoprazole    Tablet 40 milliGRAM(s) Oral before breakfast  senna 2 Tablet(s) Oral at bedtime  sodium chloride 0.9% lock flush 3 milliLiter(s) IV Push every 8 hours        Vital Signs Last 24 Hrs  T(C): 36.7 (02 Sep 2021 11:21), Max: 36.8 (01 Sep 2021 21:00)  T(F): 98.1 (02 Sep 2021 11:21), Max: 98.2 (01 Sep 2021 21:00)  HR: 72 (02 Sep 2021 13:07) (71 - 75)  BP: 102/68 (02 Sep 2021 13:07) (91/57 - 102/68)  BP(mean): --  RR: 18 (02 Sep 2021 13:07) (18 - 18)  SpO2: 97% (02 Sep 2021 13:07) (96% - 98%)    ABG - ( 01 Sep 2021 09:04 )  pH, Arterial: 7.44  pH, Blood: x     /  pCO2: 39    /  pO2: 97    / HCO3: 26    / Base Excess: 2.3   /  SaO2: 98.6              VBG pH 7.41 09-01 @ 07:31    VBG pCO2 43 09-01 @ 07:31    VBG O2 sat 81.4 09-01 @ 07:31    VBG lactate -- 09-01 @ 07:31      09-01 @ 07:01  -  09-02 @ 07:00  --------------------------------------------------------  IN: 1000 mL / OUT: 0 mL / NET: 1000 mL          LABS:                        16.5   18.32 )-----------( 281      ( 02 Sep 2021 07:08 )             50.6     09-02    135  |  100  |  23  ----------------------------<  92  4.3   |  20<L>  |  0.90    Ca    8.7      02 Sep 2021 07:08            Serum Pro-Brain Natriuretic Peptide: 329 pg/mL (08-31-21 @ 07:30)          Physical Examination:  PULM: Clear to auscultation bilaterally, no significant sputum production  CVS: RRR     RADIOLOGY REVIEWED  CT chest: < from: CT Chest No Cont (08.31.21 @ 13:41) >  FINDINGS:    Tubes/Lines: None.    Lungs, airways and pleura: Paraseptal emphysema. 3 mm right middlelobe calcified nodule. No pleural effusion. No central endobronchial lesion.    Mediastinum: Heart size is normal. No pericardial effusion. Coronary artery calcifications. A few small mediastinal lymph nodes. The thyroid gland is normal.    Upper Abdomen: The upper abdomen is unremarkable.    Bones And Soft Tissues: The bones are unremarkable.  The soft tissues are unremarkable.      IMPRESSION:    1.  Paraseptal emphysema.  2.  Coronary artery calcifications.    < end of copied text >

## 2021-09-02 NOTE — PROGRESS NOTE ADULT - PROBLEM SELECTOR PLAN 2
CT chest with paraseptal emphysema  -Not currently exacerbated  -Normoxic  -VBG noted   -Eventual outpt PFTs  -Optimization for surgery documented by attending 9/1. Suggest periop bronchodilators if wheezes.

## 2021-09-02 NOTE — PROGRESS NOTE ADULT - ASSESSMENT
42 y/o M with no pmhx current smoker 20 pack year history presented for R shoulder pain started acutely approx a week ago. The patient was lifting heavy objects in his house before onset of pain. Pain started on the R neck and would radiate downward towards his arms. Denies trauma to the area. Denies weakness to the R arm. Pain is sharp like. Worse with movement when abducting his arms and flexion upward. Pain is constant. Denies numbness or tingling. Physical exam at bedside showed reproducible R shoulder/arm pain when the patient's head is extended backward and R arm raised upward. He is also an active smoker. Patient is admitted for ACS work up after presenting to the ED. Currently denies active chest pain. However he does endorse occasional pressure-like chest pain when he is running. Active smoker of 1ppk/day. Denies hx of HLD, HTN or DM. Reports. Does not see a cardiologist.     Hospital Course:   8/31 Pulmonary Consult called for h/o active smoker.  Chest CT revealed: Paraseptal emphysema. 3 mm right middlelobe calcified nodule. No pleural effusion. No central endobronchial lesion.  9/1 Leukocytosis --> likely from prior Prednisone use currently off.  RA ABG revealed: pH, Arterial: 7.44 / pCO2, Arterial: 39 mmHg / pO2, Arterial: 97 mmHg / HCO3, Arterial: 26 mmol/L / Base Excess, Arterial: 2.3 mmol/L / Oxygen Saturation, Arterial: 98.6 % / Total CO2, Arterial: 28 mmol/L /  FIO2, Arterial: 21.  Patient cleared for Pulmonary standpoint for CABG recommend Bronchodilators for pre op wheezing PRN. Pre op cardiac surgery work up in progress.   9/2 VSS lovenox x 1 tonight - then d/c NPO for or in am  Plan for OR with Dr. Sow on Friday 9/3

## 2021-09-02 NOTE — PROGRESS NOTE ADULT - SUBJECTIVE AND OBJECTIVE BOX
Cardiac Surgery Pre-op Note:  CC: Patient is a 41y old  Male who presents with a chief complaint of shoulder pain, transfer from [facility] , cabg eval (01 Sep 2021 17:27)    Referring Physician:                                                                                             Surgeon: Dr. Ga Sow   Procedure: (Date) (Procedure)cabg    Allergies  No Known Allergies    HPI:  40 y/o M with no pmhx current smoker 20 pack year history presented for R shoulder pain started acutely approx a week ago. The patient was lifting heavy objects in his house before onset of pain. Pain started on the R neck and would radiate downward towards his arms. Denies trauma to the area. Denies weakness to the R arm. Pain is sharp like. Worse with movement when abducting his arms and flexion upward. Pain is constant. Denies numbness or tingling. Physical exam at bedside showed reproducible R shoulder/arm pain when the patient's head is extended backward and R arm raised upward.    He is also an active smoker. Patient is admitted for ACS work up after presenting to the ED. Currently denies active chest pain. However he does endorse occasional pressure-like chest pain when he is running. Active smoker of 1ppk/day. Denies hx of HLD, HTN or DM. Reports. Does not see a cardiologist.     PAST MEDICAL & SURGICAL HISTORY:  Family history of hypertension in mother    No significant past surgical history    MEDICATIONS  (STANDING):  aspirin  chewable 81 milliGRAM(s) Oral daily  atorvastatin 40 milliGRAM(s) Oral at bedtime  buPROPion XL (24-Hour) . 150 milliGRAM(s) Oral daily  cefuroxime  IVPB 1500 milliGRAM(s) IV Intermittent once  chlorhexidine 0.12% Liquid 15 milliLiter(s) Swish and Spit once  chlorhexidine 4% Liquid 1 Application(s) Topical once  enoxaparin Injectable 70 milliGRAM(s) SubCutaneous once  metoprolol tartrate 25 milliGRAM(s) Oral every 8 hours  pantoprazole    Tablet 40 milliGRAM(s) Oral before breakfast  senna 2 Tablet(s) Oral at bedtime  sodium chloride 0.9% lock flush 3 milliLiter(s) IV Push every 8 hours  Labs:                        16.5   18.32 )-----------( 281      ( 02 Sep 2021 07:08 )             50.6     135  |  100  |  23  ----------------------------<  92  4.3   |  20<L>  |  0.90    Ca    8.7      02 Sep 2021 07:08  Blood Type: ABO Interpretation: O ( @ 07:54)  HGB A1C: A1C with Estimated Average Glucose (21 @ 09:28)    A1C with Estimated Average Glucose Result: 6.1: Method: Immunoassay       Reference Range                4.0-5.6%       High risk (prediabetic)        5.7-6.4%       Diabetic, diagnostic             >=6.5%       ADA diabetic treatment goal       <7.0%  The Hemoglobin A1c testing is NGSP-certified.Reference ranges are based  upon the 2010 recommendations of  the American Diabetes Association.  Interpretation may vary for children  and adolescents. %    Estimated Average Glucose: 128: The Estimated Average Glucose (eAG) or Mean Plasma Glucose (MPG) value is  calculated from the hemoglobin A1c value and covers the same time period.   The American Diabetes Association (ADA) and other professional  organizations recommend reporting the eAG with the HgbA1c. mg/dL    Prealbumin: Prealbumin, Serum: 42 mg/dL ( @ 11:40)  Pro-BNP: Serum Pro-Brain Natriuretic Peptide: 329 pg/mL ( @ 07:30)  Thyroid Panel:  @ 11:40/1.54  1.6/--/--   @ 10:44/--  --/--/40    MRSA: MRSA PCR Result.: NotDetec ( @ 09:11)   / MSSA:   Urinalysis Basic - ( 31 Aug 2021 11:21 )    Color: Light Yellow / Appearance: Clear / S.020 / pH: x  Gluc: x / Ketone: Negative  / Bili: Negative / Urobili: Negative   Blood: x / Protein: Negative / Nitrite: Negative   Leuk Esterase: Negative / RBC: x / WBC x   Sq Epi: x / Non Sq Epi: x / Bacteria: x    CXR: < from: Xray Chest 2 Views PA/Lat (21 @ 17:40) >    No radiographic evidence of acute cardiopulmonary disease.    EKG: < from: 12 Lead ECG (21 @ 07:36) >  Diagnosis Line SINUS RHYTHM [id number] DEGREE A-V BLOCK  LATERAL INFARCT , AGE UNDETERMINED  INFERIOR INJURY PATTERN    Carotid Duplex:  < from: VA Duplex Carotid, Bilat (21 @ 12:12) >  IMPRESSION: No significant hemodynamic stenosis of either carotid artery.    PFT's:    Echocardiogram: < from: Transthoracic Echocardiogram (21 @ 06:30) >  CONCLUSIONS:  1. Normal mitral valve. Minimal mitral regurgitation.  2. Normal left ventricular internal dimensions and wall  thicknesses.  3. Endocardium not well visualized; grossly low normal left  ventricular systolic function.  4. Normal right ventricular size and function.    Cardiac catheterization: < from: Cardiac Cath Lab (21 @ 14:46) >  CORONARY VESSELS: The coronary circulation is right dominant.  LM:   --  LM: Angiography showed minor luminal irregularities with no flow  limiting lesions.  LAD:   --  Proximal LAD: There was a discrete 80 % stenosis. There was ISMAEL  grade 3 flow through the vessel (brisk flow).  --  Mid LAD: The distal vessel was supplied by moderate collaterals from  the RCA. There was a tubular 90 % stenosis in the middle third of the  vessel segment. There was ISMAEL grade 3 flow through the vessel (brisk  flow). In a second lesion, there was a 100 % stenosis in the distal third  of the vessel segment. There was ISMAEL grade 0 flow through the vessel (no  flow).  --  D2: The vessel was small sized. There was a discrete 90 % stenosis in  the proximal third of the vessel segment. There was ISMAEL grade 3 flow  through the vessel (brisk flow).  CX:   --  Mid circumflex: There was a 100 % stenosis just after OM2. There  was ISMAEL grade 0 flow through the vessel (no flow).  --  OM1: The distal vessel was supplied by moderate collaterals from the  first obtuse marginal (bridging collaterals). There was a 100 % stenosis  in the proximal third of the vessel segment. There was ISMAEL grade 0 flow  through the vessel (no flow).  --  OM2: The vessel was very small sized.  RCA:   --  Proximal RCA: There was a tubular 20 % stenosis.  --  Mid RCA: There was a discrete 60 % stenosis in the middle third of the  vessel segment. There was ISMAEL grade 3 flow through the vessel (brisk  flow). In a second lesion, there was a discrete 80 % stenosis in the  distal third of the vesselsegment. There was ISMAEL grade 3 flow through  the vessel (brisk flow).  --  Distal RCA: The vessel was moderately ectatic. There was a tubular 30 %  stenosis in the distal third of the vessel segment.  --  RPDA: Angiography showed minor luminal irregularities with no flow  limiting lesions.  --  RPLS: There was a discrete 80 % stenosis in the proximal third of the  vessel segment.    Gen: WN/WD NAD  Neuro: AAOx3, nonfocal  Pulm: CTA B/L  CV: RRR, S1S2 +systolic murmur  Abd: Soft, NT, ND +BS  Ext: No edema, + peripheral pulses    Pt has AICD/PPM [ ] Yes  [x ] No             Brand Name:  Pre-op Beta Blocker ordered within 24 hrs of surgery (CABG ONLY)?  [x ] Yes  [ ] No  If not, Why?  Type & Cross  [x ] Yes  [ ] No  NPO after Midnight [x ] Yes  [ ] No  Pre-op ABX ordered, to be taped on chart:  [ x] Yes  [ ] No     Hibiclens/Peridex ordered [x ] Yes  [ ] No  Intraop on Hold: PRBCs, CXR, RHIANNON [x ]   Consent obtained  [x ] Yes  [ ] No

## 2021-09-03 ENCOUNTER — APPOINTMENT (OUTPATIENT)
Dept: CARDIOTHORACIC SURGERY | Facility: HOSPITAL | Age: 41
End: 2021-09-03

## 2021-09-03 LAB
ALBUMIN SERPL ELPH-MCNC: 2.8 G/DL — LOW (ref 3.3–5)
ALP SERPL-CCNC: 42 U/L — SIGNIFICANT CHANGE UP (ref 40–120)
ALT FLD-CCNC: 41 U/L — SIGNIFICANT CHANGE UP (ref 10–45)
ANION GAP SERPL CALC-SCNC: 10 MMOL/L — SIGNIFICANT CHANGE UP (ref 5–17)
APTT BLD: 33.4 SEC — SIGNIFICANT CHANGE UP (ref 27.5–35.5)
AST SERPL-CCNC: 61 U/L — HIGH (ref 10–40)
BASE EXCESS BLDV CALC-SCNC: -0.7 MMOL/L — SIGNIFICANT CHANGE UP (ref -2–2)
BASE EXCESS BLDV CALC-SCNC: 0.7 MMOL/L — SIGNIFICANT CHANGE UP (ref -2–2)
BASE EXCESS BLDV CALC-SCNC: 1.8 MMOL/L — SIGNIFICANT CHANGE UP (ref -2–2)
BASOPHILS # BLD AUTO: 0 K/UL — SIGNIFICANT CHANGE UP (ref 0–0.2)
BASOPHILS NFR BLD AUTO: 0 % — SIGNIFICANT CHANGE UP (ref 0–2)
BILIRUB SERPL-MCNC: 0.8 MG/DL — SIGNIFICANT CHANGE UP (ref 0.2–1.2)
BLOOD GAS VENOUS - CREATININE: SIGNIFICANT CHANGE UP MG/DL (ref 0.5–1.3)
BLOOD GAS VENOUS - CREATININE: SIGNIFICANT CHANGE UP MG/DL (ref 0.5–1.3)
BUN SERPL-MCNC: 17 MG/DL — SIGNIFICANT CHANGE UP (ref 7–23)
CA-I SERPL-SCNC: 0.7 MMOL/L — CRITICAL LOW (ref 1.15–1.33)
CA-I SERPL-SCNC: 0.85 MMOL/L — LOW (ref 1.15–1.33)
CA-I SERPL-SCNC: 1.08 MMOL/L — LOW (ref 1.15–1.33)
CALCIUM SERPL-MCNC: 8.1 MG/DL — LOW (ref 8.4–10.5)
CHLORIDE BLDV-SCNC: 102 MMOL/L — SIGNIFICANT CHANGE UP (ref 96–108)
CHLORIDE BLDV-SCNC: 104 MMOL/L — SIGNIFICANT CHANGE UP (ref 96–108)
CHLORIDE BLDV-SCNC: 108 MMOL/L — SIGNIFICANT CHANGE UP (ref 96–108)
CHLORIDE SERPL-SCNC: 106 MMOL/L — SIGNIFICANT CHANGE UP (ref 96–108)
CK MB BLD-MCNC: 8.5 % — HIGH (ref 0–3.5)
CK MB CFR SERPL CALC: 52.4 NG/ML — HIGH (ref 0–6.7)
CK SERPL-CCNC: 613 U/L — HIGH (ref 30–200)
CO2 BLDV-SCNC: 25 MMOL/L — SIGNIFICANT CHANGE UP (ref 22–26)
CO2 BLDV-SCNC: 27 MMOL/L — HIGH (ref 22–26)
CO2 BLDV-SCNC: 28 MMOL/L — HIGH (ref 22–26)
CO2 SERPL-SCNC: 22 MMOL/L — SIGNIFICANT CHANGE UP (ref 22–31)
CREAT SERPL-MCNC: 0.74 MG/DL — SIGNIFICANT CHANGE UP (ref 0.5–1.3)
EOSINOPHIL # BLD AUTO: 0.3 K/UL — SIGNIFICANT CHANGE UP (ref 0–0.5)
EOSINOPHIL NFR BLD AUTO: 1 % — SIGNIFICANT CHANGE UP (ref 0–6)
FIBRINOGEN PPP-MCNC: 386 MG/DL — SIGNIFICANT CHANGE UP (ref 290–520)
GAS PNL BLDA: SIGNIFICANT CHANGE UP
GAS PNL BLDV: 132 MMOL/L — LOW (ref 136–145)
GAS PNL BLDV: 136 MMOL/L — SIGNIFICANT CHANGE UP (ref 136–145)
GAS PNL BLDV: 136 MMOL/L — SIGNIFICANT CHANGE UP (ref 136–145)
GAS PNL BLDV: SIGNIFICANT CHANGE UP
GLUCOSE BLDV-MCNC: 117 MG/DL — HIGH (ref 70–99)
GLUCOSE BLDV-MCNC: 129 MG/DL — HIGH (ref 70–99)
GLUCOSE BLDV-MCNC: 144 MG/DL — HIGH (ref 70–99)
GLUCOSE SERPL-MCNC: 163 MG/DL — HIGH (ref 70–99)
HCO3 BLDV-SCNC: 24 MMOL/L — SIGNIFICANT CHANGE UP (ref 22–29)
HCO3 BLDV-SCNC: 26 MMOL/L — SIGNIFICANT CHANGE UP (ref 22–29)
HCO3 BLDV-SCNC: 27 MMOL/L — SIGNIFICANT CHANGE UP (ref 22–29)
HCT VFR BLD CALC: 37.2 % — LOW (ref 39–50)
HCT VFR BLDA CALC: 27 % — LOW (ref 39–51)
HCT VFR BLDA CALC: 27 % — LOW (ref 39–51)
HCT VFR BLDA CALC: 29 % — LOW (ref 39–51)
HEPARINASE TEG R TIME: 6.2 MIN — SIGNIFICANT CHANGE UP (ref 4.3–8.3)
HEPARINASE TEG R TIME: 8 MIN — SIGNIFICANT CHANGE UP (ref 4.3–8.3)
HGB BLD CALC-MCNC: 9 G/DL — LOW (ref 12.6–17.4)
HGB BLD CALC-MCNC: 9.1 G/DL — LOW (ref 12.6–17.4)
HGB BLD CALC-MCNC: 9.5 G/DL — LOW (ref 12.6–17.4)
HGB BLD-MCNC: 12.6 G/DL — LOW (ref 13–17)
HOROWITZ INDEX BLDV+IHG-RTO: 40 — SIGNIFICANT CHANGE UP
INR BLD: 1.06 RATIO — SIGNIFICANT CHANGE UP (ref 0.88–1.16)
LACTATE BLDV-MCNC: 1.9 MMOL/L — SIGNIFICANT CHANGE UP (ref 0.7–2)
LACTATE BLDV-MCNC: 2.1 MMOL/L — HIGH (ref 0.7–2)
LACTATE BLDV-MCNC: 2.1 MMOL/L — HIGH (ref 0.7–2)
LYMPHOCYTES # BLD AUTO: 13 % — SIGNIFICANT CHANGE UP (ref 13–44)
LYMPHOCYTES # BLD AUTO: 3.86 K/UL — HIGH (ref 1–3.3)
MAGNESIUM SERPL-MCNC: 3 MG/DL — HIGH (ref 1.6–2.6)
MANUAL SMEAR VERIFICATION: SIGNIFICANT CHANGE UP
MCHC RBC-ENTMCNC: 30.1 PG — SIGNIFICANT CHANGE UP (ref 27–34)
MCHC RBC-ENTMCNC: 33.9 GM/DL — SIGNIFICANT CHANGE UP (ref 32–36)
MCV RBC AUTO: 89 FL — SIGNIFICANT CHANGE UP (ref 80–100)
MONOCYTES # BLD AUTO: 1.19 K/UL — HIGH (ref 0–0.9)
MONOCYTES NFR BLD AUTO: 4 % — SIGNIFICANT CHANGE UP (ref 2–14)
NEUTROPHILS # BLD AUTO: 24.35 K/UL — HIGH (ref 1.8–7.4)
NEUTROPHILS NFR BLD AUTO: 80 % — HIGH (ref 43–77)
NEUTS BAND # BLD: 2 % — SIGNIFICANT CHANGE UP (ref 0–8)
NRBC # BLD: 0 /100 — SIGNIFICANT CHANGE UP (ref 0–0)
PCO2 BLDV: 40 MMHG — LOW (ref 42–55)
PCO2 BLDV: 42 MMHG — SIGNIFICANT CHANGE UP (ref 42–55)
PCO2 BLDV: 44 MMHG — SIGNIFICANT CHANGE UP (ref 42–55)
PH BLDV: 7.38 — SIGNIFICANT CHANGE UP (ref 7.32–7.43)
PH BLDV: 7.39 — SIGNIFICANT CHANGE UP (ref 7.32–7.43)
PH BLDV: 7.41 — SIGNIFICANT CHANGE UP (ref 7.32–7.43)
PHOSPHATE SERPL-MCNC: 3.3 MG/DL — SIGNIFICANT CHANGE UP (ref 2.5–4.5)
PLAT MORPH BLD: NORMAL — SIGNIFICANT CHANGE UP
PLATELET # BLD AUTO: 164 K/UL — SIGNIFICANT CHANGE UP (ref 150–400)
PO2 BLDV: 100 MMHG — HIGH (ref 25–45)
PO2 BLDV: 34 MMHG — SIGNIFICANT CHANGE UP (ref 25–45)
PO2 BLDV: 60 MMHG — HIGH (ref 25–45)
POTASSIUM BLDV-SCNC: 4.1 MMOL/L — SIGNIFICANT CHANGE UP (ref 3.5–5.1)
POTASSIUM BLDV-SCNC: 5.7 MMOL/L — HIGH (ref 3.5–5.1)
POTASSIUM BLDV-SCNC: 6.2 MMOL/L — CRITICAL HIGH (ref 3.5–5.1)
POTASSIUM SERPL-MCNC: 4.3 MMOL/L — SIGNIFICANT CHANGE UP (ref 3.5–5.3)
POTASSIUM SERPL-SCNC: 4.3 MMOL/L — SIGNIFICANT CHANGE UP (ref 3.5–5.3)
PROT SERPL-MCNC: 4.2 G/DL — LOW (ref 6–8.3)
PROTHROM AB SERPL-ACNC: 12.7 SEC — SIGNIFICANT CHANGE UP (ref 10.6–13.6)
RAPIDTEG MAXIMUM AMPLITUDE: 51 MM (ref 52–70)
RAPIDTEG MAXIMUM AMPLITUDE: 59.9 MM — SIGNIFICANT CHANGE UP (ref 52–70)
RBC # BLD: 4.18 M/UL — LOW (ref 4.2–5.8)
RBC # FLD: 13.2 % — SIGNIFICANT CHANGE UP (ref 10.3–14.5)
RBC BLD AUTO: NORMAL — SIGNIFICANT CHANGE UP
SAO2 % BLDV: 60.6 % — LOW (ref 67–88)
SAO2 % BLDV: 93.5 % — HIGH (ref 67–88)
SAO2 % BLDV: 98.5 % — HIGH (ref 67–88)
SODIUM SERPL-SCNC: 138 MMOL/L — SIGNIFICANT CHANGE UP (ref 135–145)
TEG FUNCTIONAL FIBRINOGEN: 15.7 MM — SIGNIFICANT CHANGE UP (ref 15–32)
TEG FUNCTIONAL FIBRINOGEN: 18.4 MM — SIGNIFICANT CHANGE UP (ref 15–32)
TEG MAXIMUM AMPLITUDE: 53.2 MM — SIGNIFICANT CHANGE UP (ref 52–69)
TEG MAXIMUM AMPLITUDE: 57.2 MM — SIGNIFICANT CHANGE UP (ref 52–69)
TEG REACTION TIME: 7.2 MIN — SIGNIFICANT CHANGE UP (ref 4.6–9.1)
TEG REACTION TIME: 9.5 MIN (ref 4.6–9.1)
TROPONIN T, HIGH SENSITIVITY RESULT: 819 NG/L — HIGH (ref 0–51)
WBC # BLD: 29.69 K/UL — HIGH (ref 3.8–10.5)
WBC # FLD AUTO: 29.69 K/UL — HIGH (ref 3.8–10.5)

## 2021-09-03 PROCEDURE — 33533 CABG ARTERIAL SINGLE: CPT

## 2021-09-03 PROCEDURE — 33517 CABG ARTERY-VEIN SINGLE: CPT

## 2021-09-03 PROCEDURE — 71045 X-RAY EXAM CHEST 1 VIEW: CPT | Mod: 26

## 2021-09-03 PROCEDURE — 99291 CRITICAL CARE FIRST HOUR: CPT

## 2021-09-03 PROCEDURE — 33508 ENDOSCOPIC VEIN HARVEST: CPT | Mod: 59

## 2021-09-03 RX ORDER — SODIUM CHLORIDE 9 MG/ML
1000 INJECTION INTRAMUSCULAR; INTRAVENOUS; SUBCUTANEOUS
Refills: 0 | Status: DISCONTINUED | OUTPATIENT
Start: 2021-09-03 | End: 2021-09-07

## 2021-09-03 RX ORDER — SODIUM CHLORIDE 9 MG/ML
500 INJECTION, SOLUTION INTRAVENOUS ONCE
Refills: 0 | Status: COMPLETED | OUTPATIENT
Start: 2021-09-03 | End: 2021-09-03

## 2021-09-03 RX ORDER — PANTOPRAZOLE SODIUM 20 MG/1
40 TABLET, DELAYED RELEASE ORAL DAILY
Refills: 0 | Status: DISCONTINUED | OUTPATIENT
Start: 2021-09-03 | End: 2021-09-07

## 2021-09-03 RX ORDER — CALCIUM GLUCONATE 100 MG/ML
1 VIAL (ML) INTRAVENOUS ONCE
Refills: 0 | Status: COMPLETED | OUTPATIENT
Start: 2021-09-03 | End: 2021-09-03

## 2021-09-03 RX ORDER — OXYCODONE HYDROCHLORIDE 5 MG/1
5 TABLET ORAL EVERY 4 HOURS
Refills: 0 | Status: DISCONTINUED | OUTPATIENT
Start: 2021-09-03 | End: 2021-09-09

## 2021-09-03 RX ORDER — DEXTROSE 50 % IN WATER 50 %
25 SYRINGE (ML) INTRAVENOUS
Refills: 0 | Status: DISCONTINUED | OUTPATIENT
Start: 2021-09-03 | End: 2021-09-09

## 2021-09-03 RX ORDER — HYDROMORPHONE HYDROCHLORIDE 2 MG/ML
0.5 INJECTION INTRAMUSCULAR; INTRAVENOUS; SUBCUTANEOUS ONCE
Refills: 0 | Status: DISCONTINUED | OUTPATIENT
Start: 2021-09-03 | End: 2021-09-03

## 2021-09-03 RX ORDER — POTASSIUM CHLORIDE 20 MEQ
10 PACKET (EA) ORAL
Refills: 0 | Status: DISCONTINUED | OUTPATIENT
Start: 2021-09-03 | End: 2021-09-04

## 2021-09-03 RX ORDER — NOREPINEPHRINE BITARTRATE/D5W 8 MG/250ML
0.05 PLASTIC BAG, INJECTION (ML) INTRAVENOUS
Qty: 8 | Refills: 0 | Status: DISCONTINUED | OUTPATIENT
Start: 2021-09-03 | End: 2021-09-04

## 2021-09-03 RX ORDER — METOCLOPRAMIDE HCL 10 MG
10 TABLET ORAL EVERY 8 HOURS
Refills: 0 | Status: COMPLETED | OUTPATIENT
Start: 2021-09-03 | End: 2021-09-05

## 2021-09-03 RX ORDER — ALBUMIN HUMAN 25 %
250 VIAL (ML) INTRAVENOUS
Refills: 0 | Status: COMPLETED | OUTPATIENT
Start: 2021-09-03 | End: 2021-09-03

## 2021-09-03 RX ORDER — CEFUROXIME AXETIL 250 MG
1500 TABLET ORAL EVERY 8 HOURS
Refills: 0 | Status: COMPLETED | OUTPATIENT
Start: 2021-09-03 | End: 2021-09-04

## 2021-09-03 RX ORDER — DOBUTAMINE HCL 250MG/20ML
1 VIAL (ML) INTRAVENOUS
Qty: 500 | Refills: 0 | Status: DISCONTINUED | OUTPATIENT
Start: 2021-09-03 | End: 2021-09-05

## 2021-09-03 RX ORDER — DEXTROSE 50 % IN WATER 50 %
50 SYRINGE (ML) INTRAVENOUS
Refills: 0 | Status: DISCONTINUED | OUTPATIENT
Start: 2021-09-03 | End: 2021-09-09

## 2021-09-03 RX ORDER — INSULIN HUMAN 100 [IU]/ML
3 INJECTION, SOLUTION SUBCUTANEOUS
Qty: 100 | Refills: 0 | Status: DISCONTINUED | OUTPATIENT
Start: 2021-09-03 | End: 2021-09-05

## 2021-09-03 RX ORDER — ALBUMIN HUMAN 25 %
250 VIAL (ML) INTRAVENOUS ONCE
Refills: 0 | Status: COMPLETED | OUTPATIENT
Start: 2021-09-03 | End: 2021-09-03

## 2021-09-03 RX ORDER — VASOPRESSIN 20 [USP'U]/ML
0.05 INJECTION INTRAVENOUS
Qty: 50 | Refills: 0 | Status: DISCONTINUED | OUTPATIENT
Start: 2021-09-03 | End: 2021-09-07

## 2021-09-03 RX ORDER — FENTANYL CITRATE 50 UG/ML
50 INJECTION INTRAVENOUS ONCE
Refills: 0 | Status: DISCONTINUED | OUTPATIENT
Start: 2021-09-03 | End: 2021-09-03

## 2021-09-03 RX ORDER — CHLORHEXIDINE GLUCONATE 213 G/1000ML
1 SOLUTION TOPICAL
Refills: 0 | Status: DISCONTINUED | OUTPATIENT
Start: 2021-09-03 | End: 2021-09-09

## 2021-09-03 RX ORDER — ACETAMINOPHEN 500 MG
650 TABLET ORAL EVERY 6 HOURS
Refills: 0 | Status: DISCONTINUED | OUTPATIENT
Start: 2021-09-03 | End: 2021-09-09

## 2021-09-03 RX ORDER — CHLORHEXIDINE GLUCONATE 213 G/1000ML
15 SOLUTION TOPICAL EVERY 12 HOURS
Refills: 0 | Status: DISCONTINUED | OUTPATIENT
Start: 2021-09-03 | End: 2021-09-04

## 2021-09-03 RX ORDER — IPRATROPIUM/ALBUTEROL SULFATE 18-103MCG
3 AEROSOL WITH ADAPTER (GRAM) INHALATION EVERY 6 HOURS
Refills: 0 | Status: DISCONTINUED | OUTPATIENT
Start: 2021-09-03 | End: 2021-09-09

## 2021-09-03 RX ORDER — DEXMEDETOMIDINE HYDROCHLORIDE IN 0.9% SODIUM CHLORIDE 4 UG/ML
0.5 INJECTION INTRAVENOUS
Qty: 200 | Refills: 0 | Status: DISCONTINUED | OUTPATIENT
Start: 2021-09-03 | End: 2021-09-04

## 2021-09-03 RX ORDER — POLYETHYLENE GLYCOL 3350 17 G/17G
17 POWDER, FOR SOLUTION ORAL DAILY
Refills: 0 | Status: DISCONTINUED | OUTPATIENT
Start: 2021-09-03 | End: 2021-09-09

## 2021-09-03 RX ADMIN — HYDROMORPHONE HYDROCHLORIDE 0.5 MILLIGRAM(S): 2 INJECTION INTRAMUSCULAR; INTRAVENOUS; SUBCUTANEOUS at 15:23

## 2021-09-03 RX ADMIN — Medication 10 MILLIGRAM(S): at 15:23

## 2021-09-03 RX ADMIN — Medication 125 MILLILITER(S): at 15:23

## 2021-09-03 RX ADMIN — FENTANYL CITRATE 50 MICROGRAM(S): 50 INJECTION INTRAVENOUS at 20:30

## 2021-09-03 RX ADMIN — CHLORHEXIDINE GLUCONATE 15 MILLILITER(S): 213 SOLUTION TOPICAL at 05:18

## 2021-09-03 RX ADMIN — SODIUM CHLORIDE 2000 MILLILITER(S): 9 INJECTION, SOLUTION INTRAVENOUS at 14:30

## 2021-09-03 RX ADMIN — Medication 1000 MILLIGRAM(S): at 23:40

## 2021-09-03 RX ADMIN — CHLORHEXIDINE GLUCONATE 15 MILLILITER(S): 213 SOLUTION TOPICAL at 18:17

## 2021-09-03 RX ADMIN — Medication 125 MILLILITER(S): at 20:17

## 2021-09-03 RX ADMIN — Medication 100 GRAM(S): at 18:17

## 2021-09-03 RX ADMIN — HYDROMORPHONE HYDROCHLORIDE 0.5 MILLIGRAM(S): 2 INJECTION INTRAMUSCULAR; INTRAVENOUS; SUBCUTANEOUS at 15:38

## 2021-09-03 RX ADMIN — Medication 100 MILLIGRAM(S): at 16:29

## 2021-09-03 RX ADMIN — VASOPRESSIN 3 UNIT(S)/MIN: 20 INJECTION INTRAVENOUS at 21:52

## 2021-09-03 RX ADMIN — Medication 500 MILLILITER(S): at 16:30

## 2021-09-03 RX ADMIN — Medication 400 MILLIGRAM(S): at 23:10

## 2021-09-03 RX ADMIN — FENTANYL CITRATE 50 MICROGRAM(S): 50 INJECTION INTRAVENOUS at 20:15

## 2021-09-03 RX ADMIN — SODIUM CHLORIDE 2000 MILLILITER(S): 9 INJECTION, SOLUTION INTRAVENOUS at 17:38

## 2021-09-03 RX ADMIN — INSULIN HUMAN 3 UNIT(S)/HR: 100 INJECTION, SOLUTION SUBCUTANEOUS at 21:52

## 2021-09-03 RX ADMIN — Medication 25 MILLIGRAM(S): at 05:16

## 2021-09-03 RX ADMIN — SODIUM CHLORIDE 3 MILLILITER(S): 9 INJECTION INTRAMUSCULAR; INTRAVENOUS; SUBCUTANEOUS at 05:26

## 2021-09-03 RX ADMIN — DEXMEDETOMIDINE HYDROCHLORIDE IN 0.9% SODIUM CHLORIDE 9.24 MICROGRAM(S)/KG/HR: 4 INJECTION INTRAVENOUS at 21:52

## 2021-09-03 RX ADMIN — Medication 6.93 MICROGRAM(S)/KG/MIN: at 21:52

## 2021-09-03 RX ADMIN — PANTOPRAZOLE SODIUM 40 MILLIGRAM(S): 20 TABLET, DELAYED RELEASE ORAL at 05:16

## 2021-09-03 RX ADMIN — Medication 10 MILLIGRAM(S): at 21:43

## 2021-09-03 RX ADMIN — SODIUM CHLORIDE 2000 MILLILITER(S): 9 INJECTION, SOLUTION INTRAVENOUS at 14:00

## 2021-09-03 RX ADMIN — Medication 500 MILLILITER(S): at 17:10

## 2021-09-03 RX ADMIN — Medication 125 MILLILITER(S): at 21:00

## 2021-09-03 RX ADMIN — Medication 11.1 MICROGRAM(S)/KG/MIN: at 21:52

## 2021-09-03 NOTE — PROGRESS NOTE ADULT - SUBJECTIVE AND OBJECTIVE BOX
MICHI ESTEVEZ  MRN-[id number]  Patient is a 41y old  Male who presents with a chief complaint of shoulder pain, transfer from [facility] , [facility] eval (02 Sep 2021 14:18)    HPI:  42 y/o M with no pmhx current smoker 20 pack year history presented for R shoulder pain started acutely approx a week ago. The patient was lifting heavy objects in his house before onset of pain. Pain started on the R neck and would radiate downward towards his arms. Denies trauma to the area. Denies weakness to the R arm. Pain is sharp like. Worse with movement when abducting his arms and flexion upward. Pain is constant. Denies numbness or tingling. Physical exam at bedside showed reproducible R shoulder/arm pain when the patient's head is extended backward and R arm raised upward.      He is also an active smoker. Patient is admitted for ACS work up after presenting to the ED. Currently denies active chest pain. However he does endorse occasional pressure-like chest pain when he is running. Active smoker of 1ppk/day. Denies hx of HLD, HTN or DM. Reports. Does not see a cardiologist.     Upon ACS work up, s/p LHC which revealed,     Denies hx of surgeries or metal in body. Does not take daily medications. (30 Aug 2021 18:38)      Surgery/Hospital course:  9/3 C1V w/ additional flow from [facility]    Today/Overnight:  Patient with history of coronary artery disease, subsequently underwent coronary artery bypass grafting procedure earlier today requiring IV Vasopressin and IV Levophed infusions for vasogenic shock.     Vital Signs Last 24 Hrs  T(C): 35.3 (03 Sep 2021 13:45), Max: 36.9 (03 Sep 2021 03:50)  T(F): 95.5 (03 Sep 2021 13:45), Max: 98.5 (03 Sep 2021 03:50)  HR: 104 (03 Sep 2021 17:00) (77 - 107)  BP: 99/65 (03 Sep 2021 06:58) (96/61 - 106/71)  BP(mean): 83 (02 Sep 2021 20:40) (83 - 83)  RR: 14 (03 Sep 2021 17:00) (13 - 22)  SpO2: 100% (03 Sep 2021 17:00) (97% - 100%)  ============================I/O===========================  I&O's Summary    02 Sep 2021 07:01  -  03 Sep 2021 07:00  --------------------------------------------------------  IN: 490 mL / OUT: 900 mL / NET: -410 mL    03 Sep 2021 07:01  -  03 Sep 2021 17:45  --------------------------------------------------------  IN: 2456.3 mL / OUT: 1345 mL / NET: 1111.3 mL      ============================ LABS =========================                        12.6   29.69 )-----------( 164      ( 03 Sep 2021 14:13 )             37.2     09-03    138  |  106  |  17  ----------------------------<  163<H>  4.3   |  22  |  0.74    Ca    8.1<L>      03 Sep 2021 14:13  Phos  3.3     09-03  Mg     3.0     09-03    TPro  4.2<L>  /  Alb  2.8<L>  /  TBili  0.8  /  DBili  x   /  AST  61<H>  /  ALT  41  /  AlkPhos  42  09-03    LIVER FUNCTIONS - ( 03 Sep 2021 14:13 )  Alb: 2.8 g/dL / Pro: 4.2 g/dL / ALK PHOS: 42 U/L / ALT: 41 U/L / AST: 61 U/L / GGT: x           PT/INR - ( 03 Sep 2021 14:12 )   PT: 12.7 sec;   INR: 1.06 ratio         PTT - ( 03 Sep 2021 14:12 )  PTT:33.4 sec  ABG - ( 03 Sep 2021 16:00 )  pH, Arterial: 7.35  pH, Blood: x     /  pCO2: 46    /  pO2: 170   / HCO3: 25    / Base Excess: -0.5  /  SaO2: 98.6                ======================Micro/Rad/Cardio=================  CXR: Reviewed  Echo: Reviewed  ======================================================  PAST MEDICAL & SURGICAL HISTORY:  Family history of hypertension in mother    No significant past surgical history      ========================ASSESSMENT ================  Coronary Artery Disease status post Coronary Artery Bypass Graft on 9/3/21  Hypertension  Leukocytosis   Stress Hyperglycemia     Plan:  ====================== NEUROLOGY=====================  Addressed analgesic regimen to optimize function and sedated for ventilatory synchrony.     acetaminophen   Tablet .. 650 milliGRAM(s) Oral every 6 hours PRN Mild Pain (1 - 3)  dexMEDEtomidine Infusion 0.5 MICROgram(s)/kG/Hr (9.24 mL/Hr) IV Continuous <Continuous>  oxyCODONE    IR 5 milliGRAM(s) Oral every 4 hours PRN Moderate Pain (4 - 6)    ==================== RESPIRATORY======================  Respiratory status required full ventilatory support, close monitoring of respiratory rate and breathing pattern, the following of ABG’s with A-line monitoring, continuous pulse oximetry monitoring    Mechanical Ventilation:  Mode: AC/ CMV (Assist Control/ Continuous Mandatory Ventilation)  RR (machine): 12  TV (machine): 600  FiO2: 50  PEEP: 5  ITime: 1  MAP: 9  PIP: 24      ====================CARDIOVASCULAR==================  Patient with history of coronary artery disease, subsequently underwent coronary artery bypass grafting procedure earlier today requiring IV Vasopressin and IV Levophed infusions for vasogenic shock. Patient with a history of Hypertension, continue IV Dobutamine for inotropic management. Invasive hemodynamic monitoring with a central venous catheter & an A-line were required for the continuous central venous and MAP/BP monitoring to ensure adequate cardiovascular support.    DOBUTamine Infusion 5 MICROgram(s)/kG/Min (11.1 mL/Hr) IV Continuous <Continuous>  norepinephrine Infusion 0.05 MICROgram(s)/kG/Min (6.93 mL/Hr) IV Continuous <Continuous>  vasopressin Infusion 0.05 Unit(s)/Min (3 mL/Hr) IV Continuous <Continuous>    ===================HEMATOLOGIC/ONC ===================  Continue to monitor hemoglobin and hematocrit levels.     ===================== RENAL =========================  Optimize renal perfusion with adequate volume resuscitation and continued monitoring of urine output, fluid balance, BUN/Creatinine.     ==================== GASTROINTESTINAL===================  NPO after recent procedure, advance diet as tolerated. Continue Protonix for stress ulcer prophylaxis. Bowel regimen with Miralax. Reglan for gut motility     metoclopramide Injectable 10 milliGRAM(s) IV Push every 8 hours  pantoprazole  Injectable 40 milliGRAM(s) IV Push daily  polyethylene glycol 3350 17 Gram(s) Oral daily  potassium chloride  10 mEq/50 mL IVPB 10 milliEquivalent(s) IV Intermittent every 1 hour  potassium chloride  10 mEq/50 mL IVPB 10 milliEquivalent(s) IV Intermittent every 1 hour  potassium chloride  10 mEq/50 mL IVPB 10 milliEquivalent(s) IV Intermittent every 1 hour  sodium chloride 0.9%. 1000 milliLiter(s) (10 mL/Hr) IV Continuous <Continuous>    =======================    ENDOCRINE  =====================  Metabolic stability, stress hyperglycemia required an IV regular Insulin drip while following serial glucose levels to help achieve and maintain euglycemia.        dextrose 50% Injectable 50 milliLiter(s) IV Push every 15 minutes  dextrose 50% Injectable 25 milliLiter(s) IV Push every 15 minutes  insulin regular Infusion 3 Unit(s)/Hr (3 mL/Hr) IV Continuous <Continuous>      ========================INFECTIOUS DISEASE================  Afebrile, white blood cells elevated at 29.69, monitor temperature and trend white blood cells. Continue Cefuroxime for perioperative antibiotic coverage.     cefuroxime  IVPB 1500 milliGRAM(s) IV Intermittent every 8 hours      Patient requires continuous monitoring with bedside rhythm monitoring, pulse oximetry monitoring, and continuous central venous and arterial pressure monitoring; and intermittent blood gas analysis.  Care plan discussed with ICU care team.    Patient remained critical, at risk for life threatening decompensation.   I have spent 35 minutes providing acute care with multiple re-evaluations throughout the evening.     By signing my name below, I, Letty Hill , attest that this documentation has been prepared under the direction and in the presence of Valerie Quevedo MD   Electronically signed: Abdulkadir Galindo, 09-03-21 @ 17:45    I, Valerie Quevedo, personally performed the services described in this documentation. All medical record entries made by the scribe were at my direction and in my presence. I have reviewed the chart and agree that the record reflects my personal performance and is accurate and complete  Electronically signed: Valerie Quevedo MD 09-03-21 @ 17:45       MICHI ESTEVEZ  MRN-[id number]  Patient is a 41y old  Male who presents with a chief complaint of shoulder pain, transfer from [facility] , [facility] eval (02 Sep 2021 14:18)    HPI:  42 y/o M with no pmhx current smoker 20 pack year history presented for R shoulder pain started acutely approx a week ago. The patient was lifting heavy objects in his house before onset of pain. Pain started on the R neck and would radiate downward towards his arms. Denies trauma to the area. Denies weakness to the R arm. Pain is sharp like. Worse with movement when abducting his arms and flexion upward. Pain is constant. Denies numbness or tingling. Physical exam at bedside showed reproducible R shoulder/arm pain when the patient's head is extended backward and R arm raised upward.      He is also an active smoker. Patient is admitted for ACS work up after presenting to the ED. Currently denies active chest pain. However he does endorse occasional pressure-like chest pain when he is running. Active smoker of 1ppk/day. Denies hx of HLD, HTN or DM. Reports. Does not see a cardiologist.     Upon ACS work up, s/p LHC which revealed,     Denies hx of surgeries or metal in body. Does not take daily medications. (30 Aug 2021 18:38)      Surgery/Hospital course:  9/3 C1V w/ additional flow from [facility]    Today/Overnight:  Patient with history of coronary artery disease, subsequently underwent coronary artery bypass grafting procedure earlier today requiring IV Vasopressin and IV Levophed infusions for vasogenic shock.     Vital Signs Last 24 Hrs  T(C): 35.3 (03 Sep 2021 13:45), Max: 36.9 (03 Sep 2021 03:50)  T(F): 95.5 (03 Sep 2021 13:45), Max: 98.5 (03 Sep 2021 03:50)  HR: 104 (03 Sep 2021 17:00) (77 - 107)  BP: 99/65 (03 Sep 2021 06:58) (96/61 - 106/71)  BP(mean): 83 (02 Sep 2021 20:40) (83 - 83)  RR: 14 (03 Sep 2021 17:00) (13 - 22)  SpO2: 100% (03 Sep 2021 17:00) (97% - 100%)  ============================I/O===========================  I&O's Summary    02 Sep 2021 07:01  -  03 Sep 2021 07:00  --------------------------------------------------------  IN: 490 mL / OUT: 900 mL / NET: -410 mL    03 Sep 2021 07:01  -  03 Sep 2021 17:45  --------------------------------------------------------  IN: 2456.3 mL / OUT: 1345 mL / NET: 1111.3 mL      ============================ LABS =========================                        12.6   29.69 )-----------( 164      ( 03 Sep 2021 14:13 )             37.2     09-03    138  |  106  |  17  ----------------------------<  163<H>  4.3   |  22  |  0.74    Ca    8.1<L>      03 Sep 2021 14:13  Phos  3.3     09-03  Mg     3.0     09-03    TPro  4.2<L>  /  Alb  2.8<L>  /  TBili  0.8  /  DBili  x   /  AST  61<H>  /  ALT  41  /  AlkPhos  42  09-03    LIVER FUNCTIONS - ( 03 Sep 2021 14:13 )  Alb: 2.8 g/dL / Pro: 4.2 g/dL / ALK PHOS: 42 U/L / ALT: 41 U/L / AST: 61 U/L / GGT: x           PT/INR - ( 03 Sep 2021 14:12 )   PT: 12.7 sec;   INR: 1.06 ratio         PTT - ( 03 Sep 2021 14:12 )  PTT:33.4 sec  ABG - ( 03 Sep 2021 16:00 )  pH, Arterial: 7.35  pH, Blood: x     /  pCO2: 46    /  pO2: 170   / HCO3: 25    / Base Excess: -0.5  /  SaO2: 98.6                ======================Micro/Rad/Cardio=================  CXR: Reviewed  Echo: Reviewed  ======================================================  PAST MEDICAL & SURGICAL HISTORY:  Family history of hypertension in mother    No significant past surgical history      ========================ASSESSMENT ================  Coronary Artery Disease status post Coronary Artery Bypass Graft on 9/3/21  Hypertension  Leukocytosis   Stress Hyperglycemia     Plan:  ====================== NEUROLOGY=====================  Addressed analgesic regimen to optimize function and sedated for ventilatory synchrony.     acetaminophen   Tablet .. 650 milliGRAM(s) Oral every 6 hours PRN Mild Pain (1 - 3)  dexMEDEtomidine Infusion 0.5 MICROgram(s)/kG/Hr (9.24 mL/Hr) IV Continuous <Continuous>  oxyCODONE    IR 5 milliGRAM(s) Oral every 4 hours PRN Moderate Pain (4 - 6)    ==================== RESPIRATORY======================  Respiratory status required full ventilatory support, close monitoring of respiratory rate and breathing pattern, the following of ABG’s with A-line monitoring, continuous pulse oximetry monitoring    Mechanical Ventilation:  Mode: AC/ CMV (Assist Control/ Continuous Mandatory Ventilation)  RR (machine): 12  TV (machine): 600  FiO2: 50  PEEP: 5  ITime: 1  MAP: 9  PIP: 24      ====================CARDIOVASCULAR==================  Patient with history of coronary artery disease, subsequently underwent coronary artery bypass grafting procedure earlier today requiring IV Vasopressin and IV Levophed infusions for vasogenic shock. Patient with a history of Hypertension, continue IV Dobutamine for inotropic management. Invasive hemodynamic monitoring with a central venous catheter & an A-line were required for the continuous central venous and MAP/BP monitoring to ensure adequate cardiovascular support.    DOBUTamine Infusion 5 MICROgram(s)/kG/Min (11.1 mL/Hr) IV Continuous <Continuous>  norepinephrine Infusion 0.05 MICROgram(s)/kG/Min (6.93 mL/Hr) IV Continuous <Continuous>  vasopressin Infusion 0.05 Unit(s)/Min (3 mL/Hr) IV Continuous <Continuous>    ===================HEMATOLOGIC/ONC ===================  Received 5 Cryo intraoperatively, continue to monitor hemoglobin and hematocrit levels.     ===================== RENAL =========================  Optimize renal perfusion with adequate volume resuscitation and continued monitoring of urine output, fluid balance, BUN/Creatinine.     ==================== GASTROINTESTINAL===================  NPO after recent procedure, advance diet as tolerated. Continue Protonix for stress ulcer prophylaxis. Bowel regimen with Miralax. Reglan for gut motility     metoclopramide Injectable 10 milliGRAM(s) IV Push every 8 hours  pantoprazole  Injectable 40 milliGRAM(s) IV Push daily  polyethylene glycol 3350 17 Gram(s) Oral daily  potassium chloride  10 mEq/50 mL IVPB 10 milliEquivalent(s) IV Intermittent every 1 hour  potassium chloride  10 mEq/50 mL IVPB 10 milliEquivalent(s) IV Intermittent every 1 hour  potassium chloride  10 mEq/50 mL IVPB 10 milliEquivalent(s) IV Intermittent every 1 hour  sodium chloride 0.9%. 1000 milliLiter(s) (10 mL/Hr) IV Continuous <Continuous>    =======================    ENDOCRINE  =====================  Metabolic stability, stress hyperglycemia required an IV regular Insulin drip while following serial glucose levels to help achieve and maintain euglycemia.        dextrose 50% Injectable 50 milliLiter(s) IV Push every 15 minutes  dextrose 50% Injectable 25 milliLiter(s) IV Push every 15 minutes  insulin regular Infusion 3 Unit(s)/Hr (3 mL/Hr) IV Continuous <Continuous>      ========================INFECTIOUS DISEASE================  Afebrile, white blood cells elevated at 29.69, monitor temperature and trend white blood cells. Continue Cefuroxime for perioperative antibiotic coverage.     cefuroxime  IVPB 1500 milliGRAM(s) IV Intermittent every 8 hours      Patient requires continuous monitoring with bedside rhythm monitoring, pulse oximetry monitoring, and continuous central venous and arterial pressure monitoring; and intermittent blood gas analysis.  Care plan discussed with ICU care team.    Patient remained critical, at risk for life threatening decompensation.   I have spent 35 minutes providing acute care with multiple re-evaluations throughout the evening.     By signing my name below, I, Letty Hill , attest that this documentation has been prepared under the direction and in the presence of Valerie Quevedo MD   Electronically signed: Abdulkadir Galindo, 09-03-21 @ 17:45    I, Valerie Quevedo, personally performed the services described in this documentation. All medical record entries made by the scribe were at my direction and in my presence. I have reviewed the chart and agree that the record reflects my personal performance and is accurate and complete  Electronically signed: Valerie Quevedo MD 09-03-21 @ 17:45       MICHI ESTEVEZ  MRN-[id number]  Patient is a 41y old  Male who presents with a chief complaint of shoulder pain, transfer from [facility] , [facility] eval (02 Sep 2021 14:18)    HPI:  42 y/o M with no pmhx current smoker 20 pack year history presented for R shoulder pain started acutely approx a week ago. The patient was lifting heavy objects in his house before onset of pain. Pain started on the R neck and would radiate downward towards his arms. Denies trauma to the area. Denies weakness to the R arm. Pain is sharp like. Worse with movement when abducting his arms and flexion upward. Pain is constant. Denies numbness or tingling. Physical exam at bedside showed reproducible R shoulder/arm pain when the patient's head is extended backward and R arm raised upward.      He is also an active smoker. Patient is admitted for ACS work up after presenting to the ED. Currently denies active chest pain. However he does endorse occasional pressure-like chest pain when he is running. Active smoker of 1ppk/day. Denies hx of HLD, HTN or DM. Reports. Does not see a cardiologist.     Upon ACS work up, s/p LHC which revealed, multivessel CAD.    Denies hx of surgeries or metal in body. Does not take daily medications. (30 Aug 2021 18:38)      Surgery/Hospital course:  9/3 C1V w/ additional flow from [facility] to [facility].    Vital Signs Last 24 Hrs  T(C): 35.3 (03 Sep 2021 13:45), Max: 36.9 (03 Sep 2021 03:50)  T(F): 95.5 (03 Sep 2021 13:45), Max: 98.5 (03 Sep 2021 03:50)  HR: 104 (03 Sep 2021 17:00) (77 - 107)  BP: 99/65 (03 Sep 2021 06:58) (96/61 - 106/71)  BP(mean): 83 (02 Sep 2021 20:40) (83 - 83)  RR: 14 (03 Sep 2021 17:00) (13 - 22)  SpO2: 100% (03 Sep 2021 17:00) (97% - 100%)  ============================I/O===========================  I&O's Summary    02 Sep 2021 07:01  -  03 Sep 2021 07:00  --------------------------------------------------------  IN: 490 mL / OUT: 900 mL / NET: -410 mL    03 Sep 2021 07:01  -  03 Sep 2021 17:45  --------------------------------------------------------  IN: 2456.3 mL / OUT: 1345 mL / NET: 1111.3 mL      ============================ LABS =========================                        12.6   29.69 )-----------( 164      ( 03 Sep 2021 14:13 )             37.2     09-03    138  |  106  |  17  ----------------------------<  163<H>  4.3   |  22  |  0.74    Ca    8.1<L>      03 Sep 2021 14:13  Phos  3.3     09-03  Mg     3.0     09-03    TPro  4.2<L>  /  Alb  2.8<L>  /  TBili  0.8  /  DBili  x   /  AST  61<H>  /  ALT  41  /  AlkPhos  42  09-03    LIVER FUNCTIONS - ( 03 Sep 2021 14:13 )  Alb: 2.8 g/dL / Pro: 4.2 g/dL / ALK PHOS: 42 U/L / ALT: 41 U/L / AST: 61 U/L / GGT: x           PT/INR - ( 03 Sep 2021 14:12 )   PT: 12.7 sec;   INR: 1.06 ratio         PTT - ( 03 Sep 2021 14:12 )  PTT:33.4 sec  ABG - ( 03 Sep 2021 16:00 )  pH, Arterial: 7.35  pH, Blood: x     /  pCO2: 46    /  pO2: 170   / HCO3: 25    / Base Excess: -0.5  /  SaO2: 98.6      ======================Micro/Rad/Cardio=================  CXR: Reviewed  Echo: Reviewed  ======================================================  PAST MEDICAL & SURGICAL HISTORY:  Family history of hypertension in mother    No significant past surgical history      ========================ASSESSMENT ================  Coronary Artery Disease status post Coronary Artery Bypass Graft on 9/3/21  Hypertension  Leukocytosis   Stress Hyperglycemia     Plan:  ====================== NEUROLOGY=====================  Addressed analgesic regimen to optimize function and sedated for ventilatory synchrony. No focal deficit noted on exam.    acetaminophen   Tablet .. 650 milliGRAM(s) Oral every 6 hours PRN Mild Pain (1 - 3)  dexMEDEtomidine Infusion 0.5 MICROgram(s)/kG/Hr (9.24 mL/Hr) IV Continuous <Continuous>  oxyCODONE    IR 5 milliGRAM(s) Oral every 4 hours PRN Moderate Pain (4 - 6)    ==================== RESPIRATORY======================  Respiratory status required full ventilatory support, close monitoring of respiratory rate and breathing pattern, the following of ABG’s with A-line monitoring, continuous pulse oximetry monitoring. Fi02 weaned and now plan for assessment on pressure support.    Mechanical Ventilation:  Mode: AC/ CMV (Assist Control/ Continuous Mandatory Ventilation)  RR (machine): 12  TV (machine): 600  FiO2: 50  PEEP: 5  ITime: 1  MAP: 9  PIP: 24      ====================CARDIOVASCULAR==================  Patient with history of coronary artery disease, subsequently underwent coronary artery bypass grafting procedure earlier today.  Patient had acute systolic dysfunction post bypass requiring an IV Dobutamine infusion for inotropic support. Patient also required volume resuscitation and IV Vasopressin and IV Levophed infusions for vasogenic shock. Invasive hemodynamic monitoring with a central venous catheter & an A-line were required for the continuous central venous and MAP/BP monitoring to ensure adequate cardiovascular support.    DOBUTamine Infusion 5 MICROgram(s)/kG/Min (11.1 mL/Hr) IV Continuous <Continuous>  norepinephrine Infusion 0.05 MICROgram(s)/kG/Min (6.93 mL/Hr) IV Continuous <Continuous>  vasopressin Infusion 0.05 Unit(s)/Min (3 mL/Hr) IV Continuous <Continuous>    ===================HEMATOLOGIC/ONC ===================  Received 5 Cryoprecipitate intraoperatively, patient has had some ongoing mediastinal bleeding which is slowing. Continue to monitor hemoglobin and hematocrit levels. Platelets and Coag parameters adequate.    ===================== RENAL =========================  Optimize renal perfusion with adequate volume resuscitation and continued monitoring of urine output, fluid balance, and BUN/Creatinine.     ==================== GASTROINTESTINAL===================  NPO after recent procedure, advance diet as tolerated. Continue Protonix for stress ulcer prophylaxis. Bowel regimen with Miralax. Reglan for gut motility     metoclopramide Injectable 10 milliGRAM(s) IV Push every 8 hours  pantoprazole  Injectable 40 milliGRAM(s) IV Push daily  polyethylene glycol 3350 17 Gram(s) Oral daily    =======================    ENDOCRINE  =====================  Metabolic stability, stress hyperglycemia required an IV regular Insulin drip while following serial glucose levels to help achieve and maintain euglycemia.      insulin regular Infusion 3 Unit(s)/Hr (3 mL/Hr) IV Continuous <Continuous>    ========================INFECTIOUS DISEASE================  Afebrile, white blood cells elevated at 29.69, monitor temperature and trend white blood cells. Continue Cefuroxime for perioperative antibiotic coverage. Preoperatively patient received Decadron for a diagnosis of cervical radiculopathy and this was felt to be etiology of leukocytosis.    cefuroxime  IVPB 1500 milliGRAM(s) IV Intermittent every 8 hours      Patient requires continuous monitoring with bedside rhythm monitoring, pulse oximetry monitoring, and continuous central venous and arterial pressure monitoring; and intermittent blood gas analysis.  Care plan discussed with ICU care team.    Patient remained critical, at risk for life threatening decompensation.   I have spent 35 minutes providing acute care with multiple re-evaluations throughout the evening.     By signing my name below, I, Letty Hill , attest that this documentation has been prepared under the direction and in the presence of Valerie Quevedo MD   Electronically signed: Abdulkadir Galindo, 09-03-21 @ 17:45    I, Valerie Quevedo, personally performed the services described in this documentation. All medical record entries made by the scribe were at my direction and in my presence. I have reviewed the chart and agree that the record reflects my personal performance and is accurate and complete  Electronically signed: Valerie Quevedo MD 09-03-21 @ 17:45

## 2021-09-03 NOTE — PRE-ANESTHESIA EVALUATION ADULT - NSANTHOSAYNRD_GEN_A_CORE
No. EAN screening performed.  STOP BANG Legend: 0-2 = LOW Risk; 3-4 = INTERMEDIATE Risk; 5-8 = HIGH Risk

## 2021-09-03 NOTE — BRIEF OPERATIVE NOTE - OPERATION/FINDINGS
CAD. Extentive CAD with poor targets. CABG x1 SVG-LAD with LIMA to the SVG. 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

## 2021-09-04 LAB
ALBUMIN SERPL ELPH-MCNC: 3.9 G/DL — SIGNIFICANT CHANGE UP (ref 3.3–5)
ALP SERPL-CCNC: 32 U/L — LOW (ref 40–120)
ALT FLD-CCNC: 36 U/L — SIGNIFICANT CHANGE UP (ref 10–45)
ANION GAP SERPL CALC-SCNC: 10 MMOL/L — SIGNIFICANT CHANGE UP (ref 5–17)
AST SERPL-CCNC: 63 U/L — HIGH (ref 10–40)
BASE EXCESS BLDV CALC-SCNC: -0.2 MMOL/L — SIGNIFICANT CHANGE UP (ref -2–2)
BASE EXCESS BLDV CALC-SCNC: -1 MMOL/L — SIGNIFICANT CHANGE UP (ref -2–2)
BASOPHILS # BLD AUTO: 0.02 K/UL — SIGNIFICANT CHANGE UP (ref 0–0.2)
BASOPHILS NFR BLD AUTO: 0.1 % — SIGNIFICANT CHANGE UP (ref 0–2)
BILIRUB SERPL-MCNC: 1.5 MG/DL — HIGH (ref 0.2–1.2)
BLD GP AB SCN SERPL QL: NEGATIVE — SIGNIFICANT CHANGE UP
BUN SERPL-MCNC: 14 MG/DL — SIGNIFICANT CHANGE UP (ref 7–23)
CA-I SERPL-SCNC: 1.14 MMOL/L — LOW (ref 1.15–1.33)
CALCIUM SERPL-MCNC: 8.5 MG/DL — SIGNIFICANT CHANGE UP (ref 8.4–10.5)
CHLORIDE BLDV-SCNC: 98 MMOL/L — SIGNIFICANT CHANGE UP (ref 96–108)
CHLORIDE SERPL-SCNC: 105 MMOL/L — SIGNIFICANT CHANGE UP (ref 96–108)
CK MB BLD-MCNC: 4.2 % — HIGH (ref 0–3.5)
CK MB CFR SERPL CALC: 39.9 NG/ML — HIGH (ref 0–6.7)
CK SERPL-CCNC: 953 U/L — HIGH (ref 30–200)
CO2 BLDV-SCNC: 26 MMOL/L — SIGNIFICANT CHANGE UP (ref 22–26)
CO2 BLDV-SCNC: 28 MMOL/L — HIGH (ref 22–26)
CO2 SERPL-SCNC: 23 MMOL/L — SIGNIFICANT CHANGE UP (ref 22–31)
CREAT SERPL-MCNC: 0.91 MG/DL — SIGNIFICANT CHANGE UP (ref 0.5–1.3)
EOSINOPHIL # BLD AUTO: 0.02 K/UL — SIGNIFICANT CHANGE UP (ref 0–0.5)
EOSINOPHIL NFR BLD AUTO: 0.1 % — SIGNIFICANT CHANGE UP (ref 0–6)
GAS PNL BLDA: SIGNIFICANT CHANGE UP
GAS PNL BLDV: 129 MMOL/L — LOW (ref 136–145)
GAS PNL BLDV: SIGNIFICANT CHANGE UP
GLUCOSE BLDV-MCNC: 127 MG/DL — HIGH (ref 70–99)
GLUCOSE SERPL-MCNC: 137 MG/DL — HIGH (ref 70–99)
HCO3 BLDV-SCNC: 25 MMOL/L — SIGNIFICANT CHANGE UP (ref 22–29)
HCO3 BLDV-SCNC: 26 MMOL/L — SIGNIFICANT CHANGE UP (ref 22–29)
HCT VFR BLD CALC: 26.8 % — LOW (ref 39–50)
HCT VFR BLDA CALC: 22 % — LOW (ref 39–51)
HGB BLD CALC-MCNC: 7.3 G/DL — LOW (ref 12.6–17.4)
HGB BLD-MCNC: 8.9 G/DL — LOW (ref 13–17)
HOROWITZ INDEX BLDV+IHG-RTO: 36 — SIGNIFICANT CHANGE UP
HOROWITZ INDEX BLDV+IHG-RTO: 36 — SIGNIFICANT CHANGE UP
IMM GRANULOCYTES NFR BLD AUTO: 0.8 % — SIGNIFICANT CHANGE UP (ref 0–1.5)
LACTATE BLDV-MCNC: 2.3 MMOL/L — HIGH (ref 0.7–2)
LYMPHOCYTES # BLD AUTO: 1.06 K/UL — SIGNIFICANT CHANGE UP (ref 1–3.3)
LYMPHOCYTES # BLD AUTO: 5.3 % — LOW (ref 13–44)
MAGNESIUM SERPL-MCNC: 2.4 MG/DL — SIGNIFICANT CHANGE UP (ref 1.6–2.6)
MCHC RBC-ENTMCNC: 29.9 PG — SIGNIFICANT CHANGE UP (ref 27–34)
MCHC RBC-ENTMCNC: 33.2 GM/DL — SIGNIFICANT CHANGE UP (ref 32–36)
MCV RBC AUTO: 89.9 FL — SIGNIFICANT CHANGE UP (ref 80–100)
MONOCYTES # BLD AUTO: 1.5 K/UL — HIGH (ref 0–0.9)
MONOCYTES NFR BLD AUTO: 7.4 % — SIGNIFICANT CHANGE UP (ref 2–14)
NEUTROPHILS # BLD AUTO: 17.4 K/UL — HIGH (ref 1.8–7.4)
NEUTROPHILS NFR BLD AUTO: 86.3 % — HIGH (ref 43–77)
NRBC # BLD: 0 /100 WBCS — SIGNIFICANT CHANGE UP (ref 0–0)
PCO2 BLDV: 46 MMHG — SIGNIFICANT CHANGE UP (ref 42–55)
PCO2 BLDV: 50 MMHG — SIGNIFICANT CHANGE UP (ref 42–55)
PH BLDV: 7.33 — SIGNIFICANT CHANGE UP (ref 7.32–7.43)
PH BLDV: 7.34 — SIGNIFICANT CHANGE UP (ref 7.32–7.43)
PHOSPHATE SERPL-MCNC: 4.4 MG/DL — SIGNIFICANT CHANGE UP (ref 2.5–4.5)
PLATELET # BLD AUTO: 133 K/UL — LOW (ref 150–400)
PO2 BLDV: 39 MMHG — SIGNIFICANT CHANGE UP (ref 25–45)
PO2 BLDV: 43 MMHG — SIGNIFICANT CHANGE UP (ref 25–45)
POTASSIUM BLDV-SCNC: 3.6 MMOL/L — SIGNIFICANT CHANGE UP (ref 3.5–5.1)
POTASSIUM SERPL-MCNC: 4.4 MMOL/L — SIGNIFICANT CHANGE UP (ref 3.5–5.3)
POTASSIUM SERPL-SCNC: 4.4 MMOL/L — SIGNIFICANT CHANGE UP (ref 3.5–5.3)
PROT SERPL-MCNC: 5.1 G/DL — LOW (ref 6–8.3)
RBC # BLD: 2.98 M/UL — LOW (ref 4.2–5.8)
RBC # FLD: 13.2 % — SIGNIFICANT CHANGE UP (ref 10.3–14.5)
RH IG SCN BLD-IMP: POSITIVE — SIGNIFICANT CHANGE UP
SAO2 % BLDV: 68.5 % — SIGNIFICANT CHANGE UP (ref 67–88)
SAO2 % BLDV: 75.1 % — SIGNIFICANT CHANGE UP (ref 67–88)
SODIUM SERPL-SCNC: 138 MMOL/L — SIGNIFICANT CHANGE UP (ref 135–145)
TROPONIN T, HIGH SENSITIVITY RESULT: 719 NG/L — HIGH (ref 0–51)
WBC # BLD: 20.16 K/UL — HIGH (ref 3.8–10.5)
WBC # FLD AUTO: 20.16 K/UL — HIGH (ref 3.8–10.5)

## 2021-09-04 PROCEDURE — 99291 CRITICAL CARE FIRST HOUR: CPT | Mod: 25

## 2021-09-04 PROCEDURE — 71045 X-RAY EXAM CHEST 1 VIEW: CPT | Mod: 26

## 2021-09-04 PROCEDURE — 36620 INSERTION CATHETER ARTERY: CPT | Mod: 58

## 2021-09-04 PROCEDURE — 93010 ELECTROCARDIOGRAM REPORT: CPT

## 2021-09-04 PROCEDURE — 99292 CRITICAL CARE ADDL 30 MIN: CPT | Mod: 25

## 2021-09-04 RX ORDER — POTASSIUM CHLORIDE 20 MEQ
20 PACKET (EA) ORAL ONCE
Refills: 0 | Status: COMPLETED | OUTPATIENT
Start: 2021-09-04 | End: 2021-09-04

## 2021-09-04 RX ORDER — POTASSIUM CHLORIDE 20 MEQ
10 PACKET (EA) ORAL
Refills: 0 | Status: COMPLETED | OUTPATIENT
Start: 2021-09-04 | End: 2021-09-04

## 2021-09-04 RX ORDER — ALBUMIN HUMAN 25 %
250 VIAL (ML) INTRAVENOUS ONCE
Refills: 0 | Status: COMPLETED | OUTPATIENT
Start: 2021-09-04 | End: 2021-09-04

## 2021-09-04 RX ORDER — ENOXAPARIN SODIUM 100 MG/ML
40 INJECTION SUBCUTANEOUS DAILY
Refills: 0 | Status: DISCONTINUED | OUTPATIENT
Start: 2021-09-04 | End: 2021-09-09

## 2021-09-04 RX ORDER — BUPROPION HYDROCHLORIDE 150 MG/1
150 TABLET, EXTENDED RELEASE ORAL
Refills: 0 | Status: DISCONTINUED | OUTPATIENT
Start: 2021-09-07 | End: 2021-09-09

## 2021-09-04 RX ORDER — HYDROMORPHONE HYDROCHLORIDE 2 MG/ML
0.5 INJECTION INTRAMUSCULAR; INTRAVENOUS; SUBCUTANEOUS ONCE
Refills: 0 | Status: DISCONTINUED | OUTPATIENT
Start: 2021-09-04 | End: 2021-09-04

## 2021-09-04 RX ORDER — ACETAMINOPHEN 500 MG
1000 TABLET ORAL ONCE
Refills: 0 | Status: COMPLETED | OUTPATIENT
Start: 2021-09-04 | End: 2021-09-03

## 2021-09-04 RX ORDER — ATORVASTATIN CALCIUM 80 MG/1
80 TABLET, FILM COATED ORAL DAILY
Refills: 0 | Status: DISCONTINUED | OUTPATIENT
Start: 2021-09-04 | End: 2021-09-09

## 2021-09-04 RX ORDER — CLOPIDOGREL BISULFATE 75 MG/1
75 TABLET, FILM COATED ORAL DAILY
Refills: 0 | Status: DISCONTINUED | OUTPATIENT
Start: 2021-09-04 | End: 2021-09-05

## 2021-09-04 RX ORDER — BUPROPION HYDROCHLORIDE 150 MG/1
150 TABLET, EXTENDED RELEASE ORAL
Refills: 0 | Status: COMPLETED | OUTPATIENT
Start: 2021-09-04 | End: 2021-09-06

## 2021-09-04 RX ORDER — FUROSEMIDE 40 MG
40 TABLET ORAL ONCE
Refills: 0 | Status: COMPLETED | OUTPATIENT
Start: 2021-09-04 | End: 2021-09-04

## 2021-09-04 RX ORDER — ALBUMIN HUMAN 25 %
250 VIAL (ML) INTRAVENOUS ONCE
Refills: 0 | Status: COMPLETED | OUTPATIENT
Start: 2021-09-04 | End: 2021-09-03

## 2021-09-04 RX ORDER — FUROSEMIDE 40 MG
20 TABLET ORAL ONCE
Refills: 0 | Status: COMPLETED | OUTPATIENT
Start: 2021-09-04 | End: 2021-09-04

## 2021-09-04 RX ORDER — ASPIRIN/CALCIUM CARB/MAGNESIUM 324 MG
81 TABLET ORAL DAILY
Refills: 0 | Status: DISCONTINUED | OUTPATIENT
Start: 2021-09-04 | End: 2021-09-09

## 2021-09-04 RX ORDER — ACETAMINOPHEN 500 MG
1000 TABLET ORAL ONCE
Refills: 0 | Status: COMPLETED | OUTPATIENT
Start: 2021-09-04 | End: 2021-09-04

## 2021-09-04 RX ADMIN — Medication 500 MILLILITER(S): at 07:12

## 2021-09-04 RX ADMIN — Medication 3 MILLILITER(S): at 12:51

## 2021-09-04 RX ADMIN — HYDROMORPHONE HYDROCHLORIDE 0.5 MILLIGRAM(S): 2 INJECTION INTRAMUSCULAR; INTRAVENOUS; SUBCUTANEOUS at 05:10

## 2021-09-04 RX ADMIN — HYDROMORPHONE HYDROCHLORIDE 0.5 MILLIGRAM(S): 2 INJECTION INTRAMUSCULAR; INTRAVENOUS; SUBCUTANEOUS at 17:15

## 2021-09-04 RX ADMIN — ATORVASTATIN CALCIUM 80 MILLIGRAM(S): 80 TABLET, FILM COATED ORAL at 05:39

## 2021-09-04 RX ADMIN — Medication 100 MILLIGRAM(S): at 16:19

## 2021-09-04 RX ADMIN — Medication 1000 MILLIGRAM(S): at 18:55

## 2021-09-04 RX ADMIN — HYDROMORPHONE HYDROCHLORIDE 0.5 MILLIGRAM(S): 2 INJECTION INTRAMUSCULAR; INTRAVENOUS; SUBCUTANEOUS at 23:26

## 2021-09-04 RX ADMIN — HYDROMORPHONE HYDROCHLORIDE 0.5 MILLIGRAM(S): 2 INJECTION INTRAMUSCULAR; INTRAVENOUS; SUBCUTANEOUS at 09:20

## 2021-09-04 RX ADMIN — Medication 81 MILLIGRAM(S): at 05:39

## 2021-09-04 RX ADMIN — Medication 100 MILLIGRAM(S): at 08:02

## 2021-09-04 RX ADMIN — HYDROMORPHONE HYDROCHLORIDE 0.5 MILLIGRAM(S): 2 INJECTION INTRAMUSCULAR; INTRAVENOUS; SUBCUTANEOUS at 18:25

## 2021-09-04 RX ADMIN — Medication 3 MILLILITER(S): at 00:41

## 2021-09-04 RX ADMIN — ENOXAPARIN SODIUM 40 MILLIGRAM(S): 100 INJECTION SUBCUTANEOUS at 11:01

## 2021-09-04 RX ADMIN — Medication 3 MILLILITER(S): at 06:57

## 2021-09-04 RX ADMIN — HYDROMORPHONE HYDROCHLORIDE 0.5 MILLIGRAM(S): 2 INJECTION INTRAMUSCULAR; INTRAVENOUS; SUBCUTANEOUS at 22:30

## 2021-09-04 RX ADMIN — HYDROMORPHONE HYDROCHLORIDE 0.5 MILLIGRAM(S): 2 INJECTION INTRAMUSCULAR; INTRAVENOUS; SUBCUTANEOUS at 18:40

## 2021-09-04 RX ADMIN — HYDROMORPHONE HYDROCHLORIDE 0.5 MILLIGRAM(S): 2 INJECTION INTRAMUSCULAR; INTRAVENOUS; SUBCUTANEOUS at 22:20

## 2021-09-04 RX ADMIN — Medication 10 MILLIGRAM(S): at 05:39

## 2021-09-04 RX ADMIN — HYDROMORPHONE HYDROCHLORIDE 0.5 MILLIGRAM(S): 2 INJECTION INTRAMUSCULAR; INTRAVENOUS; SUBCUTANEOUS at 09:05

## 2021-09-04 RX ADMIN — HYDROMORPHONE HYDROCHLORIDE 0.5 MILLIGRAM(S): 2 INJECTION INTRAMUSCULAR; INTRAVENOUS; SUBCUTANEOUS at 17:00

## 2021-09-04 RX ADMIN — HYDROMORPHONE HYDROCHLORIDE 0.5 MILLIGRAM(S): 2 INJECTION INTRAMUSCULAR; INTRAVENOUS; SUBCUTANEOUS at 10:55

## 2021-09-04 RX ADMIN — Medication 400 MILLIGRAM(S): at 18:25

## 2021-09-04 RX ADMIN — Medication 20 MILLIGRAM(S): at 20:14

## 2021-09-04 RX ADMIN — Medication 3 MILLILITER(S): at 18:16

## 2021-09-04 RX ADMIN — Medication 40 MILLIGRAM(S): at 23:27

## 2021-09-04 RX ADMIN — OXYCODONE HYDROCHLORIDE 5 MILLIGRAM(S): 5 TABLET ORAL at 20:30

## 2021-09-04 RX ADMIN — HYDROMORPHONE HYDROCHLORIDE 0.5 MILLIGRAM(S): 2 INJECTION INTRAMUSCULAR; INTRAVENOUS; SUBCUTANEOUS at 11:25

## 2021-09-04 RX ADMIN — Medication 50 MILLIEQUIVALENT(S): at 22:54

## 2021-09-04 RX ADMIN — Medication 100 MILLIGRAM(S): at 23:49

## 2021-09-04 RX ADMIN — HYDROMORPHONE HYDROCHLORIDE 0.5 MILLIGRAM(S): 2 INJECTION INTRAMUSCULAR; INTRAVENOUS; SUBCUTANEOUS at 18:10

## 2021-09-04 RX ADMIN — Medication 500 MILLILITER(S): at 08:49

## 2021-09-04 RX ADMIN — OXYCODONE HYDROCHLORIDE 5 MILLIGRAM(S): 5 TABLET ORAL at 08:20

## 2021-09-04 RX ADMIN — Medication 650 MILLIGRAM(S): at 08:15

## 2021-09-04 RX ADMIN — BUPROPION HYDROCHLORIDE 150 MILLIGRAM(S): 150 TABLET, EXTENDED RELEASE ORAL at 09:25

## 2021-09-04 RX ADMIN — Medication 4.43 MICROGRAM(S)/KG/MIN: at 20:16

## 2021-09-04 RX ADMIN — Medication 650 MILLIGRAM(S): at 07:45

## 2021-09-04 RX ADMIN — OXYCODONE HYDROCHLORIDE 5 MILLIGRAM(S): 5 TABLET ORAL at 19:54

## 2021-09-04 RX ADMIN — OXYCODONE HYDROCHLORIDE 5 MILLIGRAM(S): 5 TABLET ORAL at 08:50

## 2021-09-04 RX ADMIN — INSULIN HUMAN 3 UNIT(S)/HR: 100 INJECTION, SOLUTION SUBCUTANEOUS at 20:16

## 2021-09-04 RX ADMIN — HYDROMORPHONE HYDROCHLORIDE 0.5 MILLIGRAM(S): 2 INJECTION INTRAMUSCULAR; INTRAVENOUS; SUBCUTANEOUS at 05:25

## 2021-09-04 RX ADMIN — VASOPRESSIN 3 UNIT(S)/MIN: 20 INJECTION INTRAVENOUS at 20:16

## 2021-09-04 RX ADMIN — Medication 50 MILLIEQUIVALENT(S): at 22:02

## 2021-09-04 RX ADMIN — Medication 10 MILLIGRAM(S): at 13:02

## 2021-09-04 RX ADMIN — POLYETHYLENE GLYCOL 3350 17 GRAM(S): 17 POWDER, FOR SOLUTION ORAL at 11:01

## 2021-09-04 RX ADMIN — Medication 20 MILLIEQUIVALENT(S): at 22:01

## 2021-09-04 RX ADMIN — CHLORHEXIDINE GLUCONATE 1 APPLICATION(S): 213 SOLUTION TOPICAL at 05:39

## 2021-09-04 RX ADMIN — PANTOPRAZOLE SODIUM 40 MILLIGRAM(S): 20 TABLET, DELAYED RELEASE ORAL at 11:02

## 2021-09-04 RX ADMIN — Medication 100 MILLIGRAM(S): at 00:25

## 2021-09-04 RX ADMIN — CLOPIDOGREL BISULFATE 75 MILLIGRAM(S): 75 TABLET, FILM COATED ORAL at 12:12

## 2021-09-04 RX ADMIN — Medication 10 MILLIGRAM(S): at 22:00

## 2021-09-04 NOTE — PROGRESS NOTE ADULT - SUBJECTIVE AND OBJECTIVE BOX
MICHI ESTEVEZ   MRN#: [id number]     The patient is a 41y Male who was seen, evaluated, & examined with the CTICU staff on rounds with a multidisciplinary care plan formulated and implemented.  All available clinical, laboratory, radiographic, pharmacologic, and electrocardiographic data were reviewed & analyzed.      The patient was still in the CTICU in critical condition secondary to persistent cardiopulmonary dysfunction, hemodynamically significant hypovolemia shock, and stress hyperglycemia.      Respiratory status required supplemental oxygen, the following of ABG’s with A-line monitoring, and continuous pulse oximetry monitoring for support & to evaluate for & prevent further decompensation secondary to persistent cardiopulmonary dysfunction.    Invasive hemodynamic monitoring with an A-line was required for the following of continuous MAP/BP monitoring to ensure adequate cardiovascular support and to evaluate for & help prevent decompensation while receiving intermittent volume expansion, an IV Levophed infusion, an IV Vasopressin drip, and an IV Dobutamine drip secondary to hemodynamically significant hypovolemia-shock.    Metabolic stability, postprocedure hyperglycemia, & infection prophylaxis required intermittent regular Insulin & the following of serial glucose levels to help achieve & maintain euglycemia.      Patient required critical care management and is at risk for life threatening decompensation  I provided 35 minutes of non-continuous care to the patient.

## 2021-09-04 NOTE — PHYSICAL THERAPY INITIAL EVALUATION ADULT - PLANNED THERAPY INTERVENTIONS, PT EVAL
GOAL: Pt will be able to independently asc/desc 10 steps with 1 HR within 2 weeks./bed mobility training/gait training/transfer training

## 2021-09-04 NOTE — PROGRESS NOTE ADULT - SUBJECTIVE AND OBJECTIVE BOX
Follow-up Pulm Progress Note    No new respiratory events overnight.  Denies SOB/CP.     Medications:  MEDICATIONS  (STANDING):  albuterol/ipratropium for Nebulization 3 milliLiter(s) Nebulizer every 6 hours  aspirin enteric coated 81 milliGRAM(s) Oral daily  atorvastatin 80 milliGRAM(s) Oral daily  buPROPion SR (12-Hour) 150 milliGRAM(s) Oral <User Schedule>  cefuroxime  IVPB 1500 milliGRAM(s) IV Intermittent every 8 hours  chlorhexidine 2% Cloths 1 Application(s) Topical <User Schedule>  clopidogrel Tablet 75 milliGRAM(s) Oral daily  dextrose 50% Injectable 50 milliLiter(s) IV Push every 15 minutes  dextrose 50% Injectable 25 milliLiter(s) IV Push every 15 minutes  DOBUTamine Infusion 3 MICROgram(s)/kG/Min (6.65 mL/Hr) IV Continuous <Continuous>  enoxaparin Injectable 40 milliGRAM(s) SubCutaneous daily  insulin regular Infusion 3 Unit(s)/Hr (3 mL/Hr) IV Continuous <Continuous>  metoclopramide Injectable 10 milliGRAM(s) IV Push every 8 hours  norepinephrine Infusion 0.05 MICROgram(s)/kG/Min (6.93 mL/Hr) IV Continuous <Continuous>  pantoprazole  Injectable 40 milliGRAM(s) IV Push daily  polyethylene glycol 3350 17 Gram(s) Oral daily  potassium chloride  10 mEq/50 mL IVPB 10 milliEquivalent(s) IV Intermittent every 1 hour  potassium chloride  10 mEq/50 mL IVPB 10 milliEquivalent(s) IV Intermittent every 1 hour  potassium chloride  10 mEq/50 mL IVPB 10 milliEquivalent(s) IV Intermittent every 1 hour  sodium chloride 0.9%. 1000 milliLiter(s) (10 mL/Hr) IV Continuous <Continuous>  vasopressin Infusion 0.05 Unit(s)/Min (3 mL/Hr) IV Continuous <Continuous>    MEDICATIONS  (PRN):  acetaminophen   Tablet .. 650 milliGRAM(s) Oral every 6 hours PRN Mild Pain (1 - 3)  oxyCODONE    IR 5 milliGRAM(s) Oral every 4 hours PRN Moderate Pain (4 - 6)      Mode: Off       Vital Signs Last 24 Hrs  T(C): 37.4 (04 Sep 2021 12:00), Max: 38.1 (03 Sep 2021 20:00)  T(F): 99.3 (04 Sep 2021 12:00), Max: 100.6 (03 Sep 2021 20:00)  HR: 92 (04 Sep 2021 14:00) (85 - 119)  BP: 102/58 (04 Sep 2021 08:30) (102/58 - 102/58)  BP(mean): 74 (04 Sep 2021 08:30) (74 - 74)  RR: 16 (04 Sep 2021 14:00) (13 - 42)  SpO2: 100% (04 Sep 2021 14:00) (96% - 100%)    ABG - ( 04 Sep 2021 13:44 )  pH, Arterial: 7.40  pH, Blood: x     /  pCO2: 43    /  pO2: 118   / HCO3: 27    / Base Excess: 1.6   /  SaO2: 99.5              VBG pH 7.33 09-04 @ 13:44    VBG pCO2 50 09-04 @ 13:44    VBG O2 sat 68.5 09-04 @ 13:44    VBG lactate -- 09-04 @ 13:44  VBG pH 7.38 09-03 @ 17:53    VBG pCO2 44 09-03 @ 17:53    VBG O2 sat 60.6 09-03 @ 17:53    VBG lactate 2.1 09-03 @ 17:53  VBG pH 7.39 09-03 @ 11:52    VBG pCO2 40 09-03 @ 11:52    VBG O2 sat 93.5 09-03 @ 11:52    VBG lactate 2.1 09-03 @ 11:52  VBG pH 7.41 09-03 @ 11:27    VBG pCO2 42 09-03 @ 11:27    VBG O2 sat 98.5 09-03 @ 11:27    VBG lactate 1.9 09-03 @ 11:27  VBG pH 7.36 09-03 @ 10:54    VBG pCO2 43 09-03 @ 10:54    VBG O2 sat 98.3 09-03 @ 10:54    VBG lactate 1.7 09-03 @ 10:54      09-03 @ 07:01  -  09-04 @ 07:00  --------------------------------------------------------  IN: 3850.6 mL / OUT: 3650 mL / NET: 200.6 mL          LABS:                        8.9    20.16 )-----------( 133      ( 04 Sep 2021 00:12 )             26.8     09-04    138  |  105  |  14  ----------------------------<  137<H>  4.4   |  23  |  0.91    Ca    8.5      04 Sep 2021 00:12  Phos  4.4     09-04  Mg     2.4     09-04    TPro  5.1<L>  /  Alb  3.9  /  TBili  1.5<H>  /  DBili  x   /  AST  63<H>  /  ALT  36  /  AlkPhos  32<L>  09-04      CARDIAC MARKERS ( 04 Sep 2021 00:56 )  x     / x     / 953 U/L / x     / 39.9 ng/mL  CARDIAC MARKERS ( 03 Sep 2021 14:13 )  x     / x     / 613 U/L / x     / 52.4 ng/mL      CAPILLARY BLOOD GLUCOSE  141 (04 Sep 2021 07:00)      POCT Blood Glucose.: 143 mg/dL (04 Sep 2021 13:56)    PT/INR - ( 03 Sep 2021 14:12 )   PT: 12.7 sec;   INR: 1.06 ratio         PTT - ( 03 Sep 2021 14:12 )  PTT:33.4 sec                    CULTURES: (if applicable)                                Physical Examination:  PULM: Clear to auscultation bilaterally, no significant sputum production  CVS: S1, S2 heard    RADIOLOGY REVIEWED  CXR:     CT chest:    TTE:   Follow-up Pulm Progress Note    OOB to chair  Breathing comfortably on N/C, O2 sats high 90s  Mild incisional chest pain  Denies SOB    Medications:  MEDICATIONS  (STANDING):  albuterol/ipratropium for Nebulization 3 milliLiter(s) Nebulizer every 6 hours  aspirin enteric coated 81 milliGRAM(s) Oral daily  atorvastatin 80 milliGRAM(s) Oral daily  buPROPion SR (12-Hour) 150 milliGRAM(s) Oral <User Schedule>  cefuroxime  IVPB 1500 milliGRAM(s) IV Intermittent every 8 hours  chlorhexidine 2% Cloths 1 Application(s) Topical <User Schedule>  clopidogrel Tablet 75 milliGRAM(s) Oral daily  dextrose 50% Injectable 50 milliLiter(s) IV Push every 15 minutes  dextrose 50% Injectable 25 milliLiter(s) IV Push every 15 minutes  DOBUTamine Infusion 3 MICROgram(s)/kG/Min (6.65 mL/Hr) IV Continuous <Continuous>  enoxaparin Injectable 40 milliGRAM(s) SubCutaneous daily  insulin regular Infusion 3 Unit(s)/Hr (3 mL/Hr) IV Continuous <Continuous>  metoclopramide Injectable 10 milliGRAM(s) IV Push every 8 hours  norepinephrine Infusion 0.05 MICROgram(s)/kG/Min (6.93 mL/Hr) IV Continuous <Continuous>  pantoprazole  Injectable 40 milliGRAM(s) IV Push daily  polyethylene glycol 3350 17 Gram(s) Oral daily  potassium chloride  10 mEq/50 mL IVPB 10 milliEquivalent(s) IV Intermittent every 1 hour  potassium chloride  10 mEq/50 mL IVPB 10 milliEquivalent(s) IV Intermittent every 1 hour  potassium chloride  10 mEq/50 mL IVPB 10 milliEquivalent(s) IV Intermittent every 1 hour  sodium chloride 0.9%. 1000 milliLiter(s) (10 mL/Hr) IV Continuous <Continuous>  vasopressin Infusion 0.05 Unit(s)/Min (3 mL/Hr) IV Continuous <Continuous>    MEDICATIONS  (PRN):  acetaminophen   Tablet .. 650 milliGRAM(s) Oral every 6 hours PRN Mild Pain (1 - 3)  oxyCODONE    IR 5 milliGRAM(s) Oral every 4 hours PRN Moderate Pain (4 - 6)      Vital Signs Last 24 Hrs  T(C): 37.4 (04 Sep 2021 12:00), Max: 38.1 (03 Sep 2021 20:00)  T(F): 99.3 (04 Sep 2021 12:00), Max: 100.6 (03 Sep 2021 20:00)  HR: 92 (04 Sep 2021 14:00) (85 - 119)  BP: 102/58 (04 Sep 2021 08:30) (102/58 - 102/58)  BP(mean): 74 (04 Sep 2021 08:30) (74 - 74)  RR: 16 (04 Sep 2021 14:00) (13 - 42)  SpO2: 100% (04 Sep 2021 14:00) (96% - 100%)    ABG - ( 04 Sep 2021 13:44 )  pH, Arterial: 7.40  pH, Blood: x     /  pCO2: 43    /  pO2: 118   / HCO3: 27    / Base Excess: 1.6   /  SaO2: 99.5      VBG pH 7.33 09-04 @ 13:44    VBG pCO2 50 09-04 @ 13:44    VBG O2 sat 68.5 09-04 @ 13:44    VBG lactate -- 09-04 @ 13:44  VBG pH 7.38 09-03 @ 17:53    VBG pCO2 44 09-03 @ 17:53    VBG O2 sat 60.6 09-03 @ 17:53    VBG lactate 2.1 09-03 @ 17:53  VBG pH 7.39 09-03 @ 11:52    VBG pCO2 40 09-03 @ 11:52    VBG O2 sat 93.5 09-03 @ 11:52    VBG lactate 2.1 09-03 @ 11:52  VBG pH 7.41 09-03 @ 11:27    VBG pCO2 42 09-03 @ 11:27    VBG O2 sat 98.5 09-03 @ 11:27    VBG lactate 1.9 09-03 @ 11:27  VBG pH 7.36 09-03 @ 10:54    VBG pCO2 43 09-03 @ 10:54    VBG O2 sat 98.3 09-03 @ 10:54    VBG lactate 1.7 09-03 @ 10:54      09-03 @ 07:01  -  09-04 @ 07:00  --------------------------------------------------------  IN: 3850.6 mL / OUT: 3650 mL / NET: 200.6 mL    LABS:                        8.9    20.16 )-----------( 133      ( 04 Sep 2021 00:12 )             26.8     09-04    138  |  105  |  14  ----------------------------<  137<H>  4.4   |  23  |  0.91    Ca    8.5      04 Sep 2021 00:12  Phos  4.4     09-04  Mg     2.4     09-04    TPro  5.1<L>  /  Alb  3.9  /  TBili  1.5<H>  /  DBili  x   /  AST  63<H>  /  ALT  36  /  AlkPhos  32<L>  09-04      CARDIAC MARKERS ( 04 Sep 2021 00:56 )  x     / x     / 953 U/L / x     / 39.9 ng/mL  CARDIAC MARKERS ( 03 Sep 2021 14:13 )  x     / x     / 613 U/L / x     / 52.4 ng/mL      CAPILLARY BLOOD GLUCOSE  141 (04 Sep 2021 07:00)    POCT Blood Glucose.: 143 mg/dL (04 Sep 2021 13:56)    PT/INR - ( 03 Sep 2021 14:12 )   PT: 12.7 sec;   INR: 1.06 ratio         PTT - ( 03 Sep 2021 14:12 )  PTT:33.4 sec    Physical Examination:  PULM: Clear to auscultation bilaterally, no significant sputum production  CVS: RRR    RADIOLOGY REVIEWED    CT chest: < from: CT Chest No Cont (08.31.21 @ 13:41) >  FINDINGS:    Tubes/Lines: None.    Lungs, airways and pleura: Paraseptal emphysema. 3 mm right middlelobe calcified nodule. No pleural effusion. No central endobronchial lesion.    Mediastinum: Heart size is normal. No pericardial effusion. Coronary artery calcifications. A few small mediastinal lymph nodes. The thyroid gland is normal.    Upper Abdomen: The upper abdomen is unremarkable.    Bones And Soft Tissues: The bones are unremarkable.  The soft tissues are unremarkable.      IMPRESSION:    1.  Paraseptal emphysema.  2.  Coronary artery calcifications.    < end of copied text >      TTE:

## 2021-09-04 NOTE — PROGRESS NOTE ADULT - PROBLEM SELECTOR PLAN 1
Appears to be associated with exertion, but also noted to occur when laying flat  -Possibly 2nd to extensive CAD, but also may be 2nd to underlying lung disease/EAN  -Now s/p CABG 9/3  -Keep sats >90% with supplemental O2   -CT chest with paraseptal emphysema  -Eventual full PFTs outpt   -Can consider outpatient PSG to r/o EAN

## 2021-09-04 NOTE — PROGRESS NOTE ADULT - PROBLEM SELECTOR PLAN 2
CT chest with paraseptal emphysema  -Not currently exacerbated  -Suggest periop bronchodilators if wheezes   -Eventual outpt PFTs

## 2021-09-04 NOTE — PHYSICAL THERAPY INITIAL EVALUATION ADULT - CRITERIA FOR SKILLED THERAPEUTIC INTERVENTIONS
functional limitations in following categories/rehab potential/therapy frequency/predicted duration of therapy intervention/anticipated equipment needs at discharge/anticipated discharge recommendation

## 2021-09-04 NOTE — PHYSICAL THERAPY INITIAL EVALUATION ADULT - PERTINENT HX OF CURRENT PROBLEM, REHAB EVAL
42 y/o M with no pmhx active 1 pack day smoker presented for R shoulder pain started acutely. Incidentally found to have mild ST changes. Admitted for ACS work up found to have severe diffuse multivessel CAD, now transferred to [facility] for CABG evaluation with Dr Sow. Dx: Angina pectoris, Neck Pain R, Tobacco use disorder. S/P CABG.

## 2021-09-04 NOTE — PROGRESS NOTE ADULT - ASSESSMENT
40 y/o M with 20 pack year smoking hx (currently 1ppd), presented to [facility] with R shoulder/neck pain that radiates down his arm x1 week. At the time of admission, denied active chest pain but notes occasional chest pressure with activity. Cardiac cath with diffuse multivessel CAD, now here for CABG evaluation with CT surgery. Pulmonary called to consult as patient is a current smoker. Currently breathing comfortably on RA.

## 2021-09-04 NOTE — PHYSICAL THERAPY INITIAL EVALUATION ADULT - GENERAL OBSERVATIONS, REHAB EVAL
Pt received seated in chair in NAD, +A-line, +R IJ, +CTs, +O2 3L NC,+Gunter, +ICU monitoring. Pt AOx4 pleasant and cooperative.

## 2021-09-04 NOTE — PROGRESS NOTE ADULT - SUBJECTIVE AND OBJECTIVE BOX
MICHI ESTEVEZ  MRN-[id number]  Patient is a 41y old  Male who presents with a chief complaint of shoulder pain, transfer from [facility] , cabg eval (04 Sep 2021 14:24)    HPI:  40 y/o M with no pmhx current smoker 20 pack year history presented for R shoulder pain started acutely approx a week ago. The patient was lifting heavy objects in his house before onset of pain. Pain started on the R neck and would radiate downward towards his arms. Denies trauma to the area. Denies weakness to the R arm. Pain is sharp like. Worse with movement when abducting his arms and flexion upward. Pain is constant. Denies numbness or tingling. Physical exam at bedside showed reproducible R shoulder/arm pain when the patient's head is extended backward and R arm raised upward.      He is also an active smoker. Patient is admitted for ACS work up after presenting to the ED. Currently denies active chest pain. However he does endorse occasional pressure-like chest pain when he is running. Active smoker of 1ppk/day. Denies hx of HLD, HTN or DM. Reports. Does not see a cardiologist.     Upon ACS work up, s/p LHC which revealed,     Denies hx of surgeries or metal in body. Does not take daily medications. (30 Aug 2021 18:38)      Surgery/Hospital course:  9/3 C1V w/ additional flow from LIMA to SVG.    Today/Overnight:  Patient is POD# 1 from a CABG requiring IV Vasopressin and IV Levophed for vasogenic shock.    Vital Signs Last 24 Hrs  T(C): 38.1 (04 Sep 2021 16:00), Max: 38.1 (03 Sep 2021 20:00)  T(F): 100.6 (04 Sep 2021 16:00), Max: 100.6 (03 Sep 2021 20:00)  HR: 92 (04 Sep 2021 19:00) (88 - 129)  BP: 106/71 (04 Sep 2021 18:00) (102/58 - 106/71)  BP(mean): 80 (04 Sep 2021 18:00) (74 - 80)  RR: 15 (04 Sep 2021 19:00) (14 - 63)  SpO2: 99% (04 Sep 2021 19:00) (89% - 100%)  ============================I/O===========================  I&O's Detail    03 Sep 2021 07:01  -  04 Sep 2021 07:00  --------------------------------------------------------  IN:    Albumin 5%  - 250 mL: 1500 mL    Dexmedetomidine: 38.9 mL    DOBUTamine: 166.5 mL    DOBUTamine: 20.1 mL    Insulin: 26 mL    IV PiggyBack: 250 mL    Lactated Ringers Bolus: 1500 mL    Norepinephrine: 73.1 mL    sodium chloride 0.9%: 180 mL    Vasopressin: 96 mL  Total IN: 3850.6 mL    OUT:    Chest Tube (mL): 290 mL    Chest Tube (mL): 480 mL    Indwelling Catheter - Urethral (mL): 2870 mL    Nasogastric/Oral tube (mL): 10 mL  Total OUT: 3650 mL    Total NET: 200.6 mL      04 Sep 2021 07:01  -  04 Sep 2021 19:37  --------------------------------------------------------  IN:    Albumin 5%  - 250 mL: 250 mL    DOBUTamine: 53.6 mL    DOBUTamine: 17.6 mL    Insulin: 26 mL    IV PiggyBack: 100 mL    Norepinephrine: 9.7 mL    Oral Fluid: 270 mL    sodium chloride 0.9%: 120 mL    Vasopressin: 18 mL  Total IN: 864.9 mL    OUT:    Chest Tube (mL): 140 mL    Chest Tube (mL): 120 mL    Indwelling Catheter - Urethral (mL): 600 mL  Total OUT: 860 mL    Total NET: 4.9 mL        ============================ LABS =========================                        8.9    20.16 )-----------( 133      ( 04 Sep 2021 00:12 )             26.8     09-04    138  |  105  |  14  ----------------------------<  137<H>  4.4   |  23  |  0.91    Ca    8.5      04 Sep 2021 00:12  Phos  4.4     09-04  Mg     2.4     09-04    TPro  5.1<L>  /  Alb  3.9  /  TBili  1.5<H>  /  DBili  x   /  AST  63<H>  /  ALT  36  /  AlkPhos  32<L>  09-04    LIVER FUNCTIONS - ( 04 Sep 2021 00:12 )  Alb: 3.9 g/dL / Pro: 5.1 g/dL / ALK PHOS: 32 U/L / ALT: 36 U/L / AST: 63 U/L / GGT: x           PT/INR - ( 03 Sep 2021 14:12 )   PT: 12.7 sec;   INR: 1.06 ratio         PTT - ( 03 Sep 2021 14:12 )  PTT:33.4 sec  ABG - ( 04 Sep 2021 18:39 )  pH, Arterial: 7.40  pH, Blood: x     /  pCO2: 43    /  pO2: 97    / HCO3: 27    / Base Excess: 1.6   /  SaO2: 99.0                ======================Micro/Rad/Cardio=================  CXR: Reviewed  Echo: Reviewed  ======================================================  PAST MEDICAL & SURGICAL HISTORY:  Family history of hypertension in mother    No significant past surgical history      ========================ASSESSMENT ================  Coronary Artery Disease status post Coronary Artery Bypass Graft on 9/3/21  Hypertension  Stress Hyperglycemia   Acute Blood Loss Anemia   Leukocytosis   Thrombocytopenia    Plan:  ====================== NEUROLOGY=====================  Continue to monitor neuro status per ICU protocol.   Continue with Tylenol and Oxycodone for pain management   C/w Bupropion for smoking cessation    acetaminophen   Tablet .. 650 milliGRAM(s) Oral every 6 hours PRN Mild Pain (1 - 3)  buPROPion SR (12-Hour) 150 milliGRAM(s) Oral <User Schedule>  oxyCODONE    IR 5 milliGRAM(s) Oral every 4 hours PRN Moderate Pain (4 - 6)    ==================== RESPIRATORY======================  Extubated yesterday to room air, SpO2 99%  Continue to monitor SpO2 via pulse oximetry  Encourage bedside spirometry   C/w bronchodilators    albuterol/ipratropium for Nebulization 3 milliLiter(s) Nebulizer every 6 hours    ====================CARDIOVASCULAR==================  Patient with history of coronary artery disease, subsequently underwent coronary artery bypass grafting procedure yesterday.   Patient had acute systolic dysfunction post bypass requiring an IV Dobutamine infusion for inotropic support.  Patient also required volume resuscitation and IV Vasopressin and IV Levophed infusions for vasogenic shock.   Continue with ASA, Lipitor, Plavix for graft patency.   Invasive hemodynamic monitoring, assess perfusion indices.       atorvastatin 80 milliGRAM(s) Oral daily  aspirin enteric coated 81 milliGRAM(s) Oral daily  clopidogrel Tablet 75 milliGRAM(s) Oral daily  DOBUTamine Infusion 2 MICROgram(s)/kG/Min (4.43 mL/Hr) IV Continuous <Continuous>  norepinephrine Infusion 0.05 MICROgram(s)/kG/Min (6.93 mL/Hr) IV Continuous <Continuous>  vasopressin Infusion 0.05 Unit(s)/Min (3 mL/Hr) IV Continuous <Continuous>    ===================HEMATOLOGIC/ONC ===================  Acute Blood Loss Anemia and Thrombocytopenia, received 5 Cryoprecipitate intraoperatively  Patient has had some ongoing mediastinal bleeding which is slowing s/p 9/3/2021  Continue to monitor hemoglobin and hematocrit levels.   Continue Levonox for venous thromboembolism prophylaxis.     enoxaparin Injectable 40 milliGRAM(s) SubCutaneous daily    ===================== RENAL =========================  Continue to monitor I/Os, BUN/Creatinine, and urine output.   Goal net negative fluid balance. Replete lytes PRN. Keep K> 4 and Mg >2.   c/w IV Lasix for diuresis    furosemide   Injectable 20 milliGRAM(s) IV Push once    ==================== GASTROINTESTINAL===================  Tolerating regular diet  Continue Protonix for stress ulcer prophylaxis.   Bowel regimen with Miralax.   Reglan for gut motility     metoclopramide Injectable 10 milliGRAM(s) IV Push every 8 hours  pantoprazole  Injectable 40 milliGRAM(s) IV Push daily  polyethylene glycol 3350 17 Gram(s) Oral daily  potassium chloride  10 mEq/50 mL IVPB 10 milliEquivalent(s) IV Intermittent every 1 hour  potassium chloride  10 mEq/50 mL IVPB 10 milliEquivalent(s) IV Intermittent every 1 hour  potassium chloride  10 mEq/50 mL IVPB 10 milliEquivalent(s) IV Intermittent every 1 hour  sodium chloride 0.9%. 1000 milliLiter(s) (10 mL/Hr) IV Continuous <Continuous>    =======================    ENDOCRINE  =====================  Metabolic stability, stress hyperglycemia required an IV regular Insulin drip while following serial glucose levels to help achieve and maintain euglycemia.      dextrose 50% Injectable 50 milliLiter(s) IV Push every 15 minutes  dextrose 50% Injectable 25 milliLiter(s) IV Push every 15 minutes  insulin regular Infusion 3 Unit(s)/Hr (3 mL/Hr) IV Continuous <Continuous>    ========================INFECTIOUS DISEASE================  TMAX: 100.6, white blood cells elevated at 29.69->20.16  monitor temperature and trend white blood cells  Continue Cefuroxime for perioperative antibiotic coverage.  Preoperatively patient received Decadron for a diagnosis of cervical radiculopathy and this was felt to be etiology of leukocytosis.    cefuroxime  IVPB 1500 milliGRAM(s) IV Intermittent every 8 hours      Patient requires continuous monitoring with bedside rhythm monitoring, pulse oximetry monitoring, and continuous central venous and arterial pressure monitoring; and intermittent blood gas analysis.  Care plan discussed with ICU care team.    Patient remained critical, at risk for life threatening decompensation.   I have spent 35 minutes providing acute care with multiple re-evaluations throughout the evening.     By signing my name below, I, Letty Hill, attest that this documentation has been prepared under the direction and in the presence of MEGHAN Neri.  Electronically signed: Abdulkadir Galindo, 09-04-21 @ 19:37    I, MEGHAN Neri, personally performed the services described in this documentation. All medical record entries made by the inocenciaibbernadette were at my direction and in my presence. I have reviewed the chart and agree that the record reflects my personal performance and is accurate and complete  Electronically signed: MEGHAN Neri, 09-04-21 @ 19:37       MICHI ESTEVEZ  MRN-[id number]  Patient is a 41y old  Male who presents with a chief complaint of shoulder pain, transfer from [facility] , cabg eval (04 Sep 2021 14:24)    HPI:  40 y/o M with no pmhx current smoker 20 pack year history presented for R shoulder pain started acutely approx a week ago. The patient was lifting heavy objects in his house before onset of pain. Pain started on the R neck and would radiate downward towards his arms. Denies trauma to the area. Denies weakness to the R arm. Pain is sharp like. Worse with movement when abducting his arms and flexion upward. Pain is constant. Denies numbness or tingling. Physical exam at bedside showed reproducible R shoulder/arm pain when the patient's head is extended backward and R arm raised upward.      He is also an active smoker. Patient is admitted for ACS work up after presenting to the ED. Currently denies active chest pain. However he does endorse occasional pressure-like chest pain when he is running. Active smoker of 1ppk/day. Denies hx of HLD, HTN or DM. Reports. Does not see a cardiologist.     Upon ACS work up, s/p LHC which revealed,     Denies hx of surgeries or metal in body. Does not take daily medications. (30 Aug 2021 18:38)      Surgery/Hospital course:  9/3 C1V w/ additional flow from LIMA to SVG.    Today/Overnight:  Patient is POD# 1 from a CABG requiring IV Vasopressin and IV Levophed for vasogenic shock.    Vital Signs Last 24 Hrs  T(C): 38.1 (04 Sep 2021 16:00), Max: 38.1 (03 Sep 2021 20:00)  T(F): 100.6 (04 Sep 2021 16:00), Max: 100.6 (03 Sep 2021 20:00)  HR: 92 (04 Sep 2021 19:00) (88 - 129)  BP: 106/71 (04 Sep 2021 18:00) (102/58 - 106/71)  BP(mean): 80 (04 Sep 2021 18:00) (74 - 80)  RR: 15 (04 Sep 2021 19:00) (14 - 63)  SpO2: 99% (04 Sep 2021 19:00) (89% - 100%)  ============================I/O===========================  I&O's Detail    03 Sep 2021 07:01  -  04 Sep 2021 07:00  --------------------------------------------------------  IN:    Albumin 5%  - 250 mL: 1500 mL    Dexmedetomidine: 38.9 mL    DOBUTamine: 166.5 mL    DOBUTamine: 20.1 mL    Insulin: 26 mL    IV PiggyBack: 250 mL    Lactated Ringers Bolus: 1500 mL    Norepinephrine: 73.1 mL    sodium chloride 0.9%: 180 mL    Vasopressin: 96 mL  Total IN: 3850.6 mL    OUT:    Chest Tube (mL): 290 mL    Chest Tube (mL): 480 mL    Indwelling Catheter - Urethral (mL): 2870 mL    Nasogastric/Oral tube (mL): 10 mL  Total OUT: 3650 mL    Total NET: 200.6 mL      04 Sep 2021 07:01  -  04 Sep 2021 19:37  --------------------------------------------------------  IN:    Albumin 5%  - 250 mL: 250 mL    DOBUTamine: 53.6 mL    DOBUTamine: 17.6 mL    Insulin: 26 mL    IV PiggyBack: 100 mL    Norepinephrine: 9.7 mL    Oral Fluid: 270 mL    sodium chloride 0.9%: 120 mL    Vasopressin: 18 mL  Total IN: 864.9 mL    OUT:    Chest Tube (mL): 140 mL    Chest Tube (mL): 120 mL    Indwelling Catheter - Urethral (mL): 600 mL  Total OUT: 860 mL    Total NET: 4.9 mL        ============================ LABS =========================                        8.9    20.16 )-----------( 133      ( 04 Sep 2021 00:12 )             26.8     09-04    138  |  105  |  14  ----------------------------<  137<H>  4.4   |  23  |  0.91    Ca    8.5      04 Sep 2021 00:12  Phos  4.4     09-04  Mg     2.4     09-04    TPro  5.1<L>  /  Alb  3.9  /  TBili  1.5<H>  /  DBili  x   /  AST  63<H>  /  ALT  36  /  AlkPhos  32<L>  09-04    LIVER FUNCTIONS - ( 04 Sep 2021 00:12 )  Alb: 3.9 g/dL / Pro: 5.1 g/dL / ALK PHOS: 32 U/L / ALT: 36 U/L / AST: 63 U/L / GGT: x           PT/INR - ( 03 Sep 2021 14:12 )   PT: 12.7 sec;   INR: 1.06 ratio         PTT - ( 03 Sep 2021 14:12 )  PTT:33.4 sec  ABG - ( 04 Sep 2021 18:39 )  pH, Arterial: 7.40  pH, Blood: x     /  pCO2: 43    /  pO2: 97    / HCO3: 27    / Base Excess: 1.6   /  SaO2: 99.0                ======================Micro/Rad/Cardio=================  CXR: Reviewed  Echo: Reviewed  ======================================================  PAST MEDICAL & SURGICAL HISTORY:  Family history of hypertension in mother    No significant past surgical history      ========================ASSESSMENT ================  Coronary Artery Disease status post Coronary Artery Bypass Graft on 9/3/21  Hypertension  Stress Hyperglycemia   Acute Blood Loss Anemia   Leukocytosis   Thrombocytopenia    Plan:  ====================== NEUROLOGY=====================  Continue to monitor neuro status per ICU protocol.   Continue with Tylenol and Oxycodone for pain management   C/w Bupropion for smoking cessation    acetaminophen   Tablet .. 650 milliGRAM(s) Oral every 6 hours PRN Mild Pain (1 - 3)  buPROPion SR (12-Hour) 150 milliGRAM(s) Oral <User Schedule>  oxyCODONE    IR 5 milliGRAM(s) Oral every 4 hours PRN Moderate Pain (4 - 6)    ==================== RESPIRATORY======================  Extubated yesterday to room air, SpO2 99%  Continue to monitor SpO2 via pulse oximetry  Encourage bedside spirometry   C/w bronchodilators    albuterol/ipratropium for Nebulization 3 milliLiter(s) Nebulizer every 6 hours    ====================CARDIOVASCULAR==================  Patient with history of coronary artery disease, subsequently underwent coronary artery bypass grafting procedure yesterday.   Patient had acute systolic dysfunction post bypass requiring an IV Dobutamine infusion for inotropic support.  Patient also required volume resuscitation and IV Vasopressin and IV Levophed infusions for vasogenic shock.   Continue with ASA, Lipitor, Plavix for graft patency.   Invasive hemodynamic monitoring, assess perfusion indices.       atorvastatin 80 milliGRAM(s) Oral daily  aspirin enteric coated 81 milliGRAM(s) Oral daily  clopidogrel Tablet 75 milliGRAM(s) Oral daily  DOBUTamine Infusion 2 MICROgram(s)/kG/Min (4.43 mL/Hr) IV Continuous <Continuous>  norepinephrine Infusion 0.05 MICROgram(s)/kG/Min (6.93 mL/Hr) IV Continuous <Continuous>  vasopressin Infusion 0.05 Unit(s)/Min (3 mL/Hr) IV Continuous <Continuous>    ===================HEMATOLOGIC/ONC ===================  Acute Blood Loss Anemia and Thrombocytopenia, received 5 Cryoprecipitate intraoperatively  Patient has had some ongoing mediastinal bleeding which is slowing s/p 9/3/2021  Continue to monitor hemoglobin and hematocrit levels.   Continue Levonox for venous thromboembolism prophylaxis.     enoxaparin Injectable 40 milliGRAM(s) SubCutaneous daily    ===================== RENAL =========================  Continue to monitor I/Os, BUN/Creatinine, and urine output.   Goal net negative fluid balance. Replete lytes PRN. Keep K> 4 and Mg >2.   c/w IV Lasix for diuresis    furosemide   Injectable 20 milliGRAM(s) IV Push once    ==================== GASTROINTESTINAL===================  Tolerating regular diet  Continue Protonix for stress ulcer prophylaxis.   Bowel regimen with Miralax.   Reglan for gut motility     metoclopramide Injectable 10 milliGRAM(s) IV Push every 8 hours  pantoprazole  Injectable 40 milliGRAM(s) IV Push daily  polyethylene glycol 3350 17 Gram(s) Oral daily  potassium chloride  10 mEq/50 mL IVPB 10 milliEquivalent(s) IV Intermittent every 1 hour  potassium chloride  10 mEq/50 mL IVPB 10 milliEquivalent(s) IV Intermittent every 1 hour  potassium chloride  10 mEq/50 mL IVPB 10 milliEquivalent(s) IV Intermittent every 1 hour  sodium chloride 0.9%. 1000 milliLiter(s) (10 mL/Hr) IV Continuous <Continuous>    =======================    ENDOCRINE  =====================  Metabolic stability, stress hyperglycemia required an IV regular Insulin drip while following serial glucose levels to help achieve and maintain euglycemia.      dextrose 50% Injectable 50 milliLiter(s) IV Push every 15 minutes  dextrose 50% Injectable 25 milliLiter(s) IV Push every 15 minutes  insulin regular Infusion 3 Unit(s)/Hr (3 mL/Hr) IV Continuous <Continuous>    ========================INFECTIOUS DISEASE================  TMAX: 100.6, white blood cells elevated at 29.69->20.16  monitor temperature and trend white blood cells  Continue Cefuroxime for perioperative antibiotic coverage.  Preoperatively patient received Decadron for a diagnosis of cervical radiculopathy and this was felt to be etiology of leukocytosis.    cefuroxime  IVPB 1500 milliGRAM(s) IV Intermittent every 8 hours      Patient requires continuous monitoring with bedside rhythm monitoring, pulse oximetry monitoring, and continuous central venous and arterial pressure monitoring; and intermittent blood gas analysis.  Care plan discussed with ICU care team.    Patient remained critical, at risk for life threatening decompensation.   I have spent 40 minutes providing acute care with multiple re-evaluations throughout the evening.     By signing my name below, I, Letty Hill, attest that this documentation has been prepared under the direction and in the presence of MEGHAN Neri.  Electronically signed: Abdulkadir Galindo, 09-04-21 @ 19:37    I, MEGHAN Neri, personally performed the services described in this documentation. All medical record entries made by the inocenciaibbernadette were at my direction and in my presence. I have reviewed the chart and agree that the record reflects my personal performance and is accurate and complete  Electronically signed: MEGHAN Neri, 09-04-21 @ 19:37       MICHI ESTEVEZ  MRN-[id number]  Patient is a 41y old  Male who presents with a chief complaint of shoulder pain, transfer from [facility] , cabg eval (04 Sep 2021 14:24)    HPI:  40 y/o M with no pmhx current smoker 20 pack year history presented for R shoulder pain started acutely approx a week ago. The patient was lifting heavy objects in his house before onset of pain. Pain started on the R neck and would radiate downward towards his arms. Denies trauma to the area. Denies weakness to the R arm. Pain is sharp like. Worse with movement when abducting his arms and flexion upward. Pain is constant. Denies numbness or tingling. Physical exam at bedside showed reproducible R shoulder/arm pain when the patient's head is extended backward and R arm raised upward.      He is also an active smoker. Patient is admitted for ACS work up after presenting to the ED. Currently denies active chest pain. However he does endorse occasional pressure-like chest pain when he is running. Active smoker of 1ppk/day. Denies hx of HLD, HTN or DM. Reports. Does not see a cardiologist.     Upon ACS work up, s/p LHC which revealed,     Denies hx of surgeries or metal in body. Does not take daily medications. (30 Aug 2021 18:38)      Surgery/Hospital course:  9/3 C1V w/ additional flow from LIMA to SVG. Composite graft.    Today/Overnight:  Patient is POD# 1 from a CABG requiring IV Vasopressin and Dobutamine for inotropic support.    Vital Signs Last 24 Hrs  T(C): 38.1 (04 Sep 2021 16:00), Max: 38.1 (03 Sep 2021 20:00)  T(F): 100.6 (04 Sep 2021 16:00), Max: 100.6 (03 Sep 2021 20:00)  HR: 92 (04 Sep 2021 19:00) (88 - 129)  BP: 106/71 (04 Sep 2021 18:00) (102/58 - 106/71)  BP(mean): 80 (04 Sep 2021 18:00) (74 - 80)  RR: 15 (04 Sep 2021 19:00) (14 - 63)  SpO2: 99% (04 Sep 2021 19:00) (89% - 100%)  ============================I/O===========================  I&O's Detail    03 Sep 2021 07:01  -  04 Sep 2021 07:00  --------------------------------------------------------  IN:    Albumin 5%  - 250 mL: 1500 mL    Dexmedetomidine: 38.9 mL    DOBUTamine: 166.5 mL    DOBUTamine: 20.1 mL    Insulin: 26 mL    IV PiggyBack: 250 mL    Lactated Ringers Bolus: 1500 mL    Norepinephrine: 73.1 mL    sodium chloride 0.9%: 180 mL    Vasopressin: 96 mL  Total IN: 3850.6 mL    OUT:    Chest Tube (mL): 290 mL    Chest Tube (mL): 480 mL    Indwelling Catheter - Urethral (mL): 2870 mL    Nasogastric/Oral tube (mL): 10 mL  Total OUT: 3650 mL    Total NET: 200.6 mL      04 Sep 2021 07:01  -  04 Sep 2021 19:37  --------------------------------------------------------  IN:    Albumin 5%  - 250 mL: 250 mL    DOBUTamine: 53.6 mL    DOBUTamine: 17.6 mL    Insulin: 26 mL    IV PiggyBack: 100 mL    Norepinephrine: 9.7 mL    Oral Fluid: 270 mL    sodium chloride 0.9%: 120 mL    Vasopressin: 18 mL  Total IN: 864.9 mL    OUT:    Chest Tube (mL): 140 mL    Chest Tube (mL): 120 mL    Indwelling Catheter - Urethral (mL): 600 mL  Total OUT: 860 mL    Total NET: 4.9 mL        ============================ LABS =========================                        8.9    20.16 )-----------( 133      ( 04 Sep 2021 00:12 )             26.8     09-04    138  |  105  |  14  ----------------------------<  137<H>  4.4   |  23  |  0.91    Ca    8.5      04 Sep 2021 00:12  Phos  4.4     09-04  Mg     2.4     09-04    TPro  5.1<L>  /  Alb  3.9  /  TBili  1.5<H>  /  DBili  x   /  AST  63<H>  /  ALT  36  /  AlkPhos  32<L>  09-04    LIVER FUNCTIONS - ( 04 Sep 2021 00:12 )  Alb: 3.9 g/dL / Pro: 5.1 g/dL / ALK PHOS: 32 U/L / ALT: 36 U/L / AST: 63 U/L / GGT: x           PT/INR - ( 03 Sep 2021 14:12 )   PT: 12.7 sec;   INR: 1.06 ratio         PTT - ( 03 Sep 2021 14:12 )  PTT:33.4 sec  ABG - ( 04 Sep 2021 18:39 )  pH, Arterial: 7.40  pH, Blood: x     /  pCO2: 43    /  pO2: 97    / HCO3: 27    / Base Excess: 1.6   /  SaO2: 99.0                ======================Micro/Rad/Cardio=================  CXR: Reviewed  Echo: Reviewed  ======================================================  PAST MEDICAL & SURGICAL HISTORY:  Family history of hypertension in mother    No significant past surgical history      ========================ASSESSMENT ================  Coronary Artery Disease status post Coronary Artery Bypass Graft on 9/3/21  Hypertension  Stress Hyperglycemia   Acute Blood Loss Anemia   Leukocytosis   Thrombocytopenia    Plan:  ====================== NEUROLOGY=====================  Continue to monitor neuro status per ICU protocol.   Continue with Tylenol and Oxycodone for pain management   C/w Bupropion for smoking cessation    acetaminophen   Tablet .. 650 milliGRAM(s) Oral every 6 hours PRN Mild Pain (1 - 3)  buPROPion SR (12-Hour) 150 milliGRAM(s) Oral <User Schedule>  oxyCODONE    IR 5 milliGRAM(s) Oral every 4 hours PRN Moderate Pain (4 - 6)    ==================== RESPIRATORY======================  Extubated yesterday to room air, SpO2 99%  Continue to monitor SpO2 via pulse oximetry  Encourage bedside spirometry   C/w bronchodilators  Aggressive chest PT, pulm toilet    albuterol/ipratropium for Nebulization 3 milliLiter(s) Nebulizer every 6 hours    ====================CARDIOVASCULAR==================  Patient with history of coronary artery disease, subsequently underwent coronary artery bypass grafting procedure 9/3   Patient had acute systolic dysfunction post bypass requiring an IV Dobutamine infusion for inotropic support.  Patient also required volume resuscitation and IV Vasopressin for vasogenic shock.   Continue with ASA, Lipitor, Plavix for graft patency.   Invasive hemodynamic monitoring, assess perfusion indices.       atorvastatin 80 milliGRAM(s) Oral daily  aspirin enteric coated 81 milliGRAM(s) Oral daily  clopidogrel Tablet 75 milliGRAM(s) Oral daily  DOBUTamine Infusion 2 MICROgram(s)/kG/Min (4.43 mL/Hr) IV Continuous <Continuous>  norepinephrine Infusion 0.05 MICROgram(s)/kG/Min (6.93 mL/Hr) IV Continuous <Continuous>  vasopressin Infusion 0.05 Unit(s)/Min (3 mL/Hr) IV Continuous <Continuous>    ===================HEMATOLOGIC/ONC ===================  Acute Blood Loss Anemia and Thrombocytopenia, received 5 Cryoprecipitate intraoperatively  Continue to monitor hemoglobin and hematocrit levels. Transfused 1 PRBC 9/4 evening.  Continue Levonox for venous thromboembolism prophylaxis.     enoxaparin Injectable 40 milliGRAM(s) SubCutaneous daily    ===================== RENAL =========================  Continue to monitor I/Os, BUN/Creatinine, and urine output.   Goal net negative fluid balance. Replete lytes PRN. Keep K> 4 and Mg >2.   c/w IV Lasix for diuresis    furosemide   Injectable 20 milliGRAM(s) IV Push once    ==================== GASTROINTESTINAL===================  Tolerating regular diet  Continue Protonix for stress ulcer prophylaxis.   Bowel regimen with Miralax.   Reglan for gut motility     metoclopramide Injectable 10 milliGRAM(s) IV Push every 8 hours  pantoprazole  Injectable 40 milliGRAM(s) IV Push daily  polyethylene glycol 3350 17 Gram(s) Oral daily  potassium chloride  10 mEq/50 mL IVPB 10 milliEquivalent(s) IV Intermittent every 1 hour  potassium chloride  10 mEq/50 mL IVPB 10 milliEquivalent(s) IV Intermittent every 1 hour  potassium chloride  10 mEq/50 mL IVPB 10 milliEquivalent(s) IV Intermittent every 1 hour  sodium chloride 0.9%. 1000 milliLiter(s) (10 mL/Hr) IV Continuous <Continuous>    =======================    ENDOCRINE  =====================  Metabolic stability, stress hyperglycemia required an IV regular Insulin drip while following serial glucose levels to help achieve and maintain euglycemia.      dextrose 50% Injectable 50 milliLiter(s) IV Push every 15 minutes  dextrose 50% Injectable 25 milliLiter(s) IV Push every 15 minutes  insulin regular Infusion 3 Unit(s)/Hr (3 mL/Hr) IV Continuous <Continuous>    ========================INFECTIOUS DISEASE================  TMAX: 100.6, white blood cells elevated at 29.69->20.16  monitor temperature and trend white blood cells  Continue Cefuroxime for perioperative antibiotic coverage.  Preoperatively patient received Decadron for a diagnosis of cervical radiculopathy and this was felt to be etiology of leukocytosis.    cefuroxime  IVPB 1500 milliGRAM(s) IV Intermittent every 8 hours      Patient requires continuous monitoring with bedside rhythm monitoring, pulse oximetry monitoring, and continuous central venous and arterial pressure monitoring; and intermittent blood gas analysis.  Care plan discussed with ICU care team.    Patient remained critical, at risk for life threatening decompensation.   I have spent 40 minutes providing acute care with multiple re-evaluations throughout the evening.     By signing my name below, I, Letty Hill, attest that this documentation has been prepared under the direction and in the presence of MEGHAN Neri.  Electronically signed: Abdulkadir Galindo, 09-04-21 @ 19:37    I, MEGHAN Neri, personally performed the services described in this documentation. All medical record entries made by the inocenciaibbernadette were at my direction and in my presence. I have reviewed the chart and agree that the record reflects my personal performance and is accurate and complete  Electronically signed: MEGHAN Neri, 09-04-21 @ 19:37

## 2021-09-05 LAB
ALBUMIN SERPL ELPH-MCNC: 4.6 G/DL — SIGNIFICANT CHANGE UP (ref 3.3–5)
ALP SERPL-CCNC: 44 U/L — SIGNIFICANT CHANGE UP (ref 40–120)
ALT FLD-CCNC: 42 U/L — SIGNIFICANT CHANGE UP (ref 10–45)
ANION GAP SERPL CALC-SCNC: 13 MMOL/L — SIGNIFICANT CHANGE UP (ref 5–17)
APPEARANCE UR: ABNORMAL
AST SERPL-CCNC: 70 U/L — HIGH (ref 10–40)
BACTERIA # UR AUTO: NEGATIVE — SIGNIFICANT CHANGE UP
BASE EXCESS BLDV CALC-SCNC: 3.3 MMOL/L — HIGH (ref -2–2)
BASE EXCESS BLDV CALC-SCNC: 4.2 MMOL/L — HIGH (ref -2–2)
BASE EXCESS BLDV CALC-SCNC: 6.3 MMOL/L — HIGH (ref -2–2)
BASOPHILS # BLD AUTO: 0.02 K/UL — SIGNIFICANT CHANGE UP (ref 0–0.2)
BASOPHILS NFR BLD AUTO: 0.1 % — SIGNIFICANT CHANGE UP (ref 0–2)
BILIRUB SERPL-MCNC: 1.4 MG/DL — HIGH (ref 0.2–1.2)
BILIRUB UR-MCNC: NEGATIVE — SIGNIFICANT CHANGE UP
BUN SERPL-MCNC: 14 MG/DL — SIGNIFICANT CHANGE UP (ref 7–23)
CA-I SERPL-SCNC: 1.14 MMOL/L — LOW (ref 1.15–1.33)
CA-I SERPL-SCNC: 1.14 MMOL/L — LOW (ref 1.15–1.33)
CA-I SERPL-SCNC: 1.15 MMOL/L — SIGNIFICANT CHANGE UP (ref 1.15–1.33)
CALCIUM SERPL-MCNC: 9.4 MG/DL — SIGNIFICANT CHANGE UP (ref 8.4–10.5)
CHLORIDE BLDV-SCNC: 100 MMOL/L — SIGNIFICANT CHANGE UP (ref 96–108)
CHLORIDE BLDV-SCNC: 99 MMOL/L — SIGNIFICANT CHANGE UP (ref 96–108)
CHLORIDE BLDV-SCNC: 99 MMOL/L — SIGNIFICANT CHANGE UP (ref 96–108)
CHLORIDE SERPL-SCNC: 96 MMOL/L — SIGNIFICANT CHANGE UP (ref 96–108)
CO2 BLDV-SCNC: 30 MMOL/L — HIGH (ref 22–26)
CO2 BLDV-SCNC: 32 MMOL/L — HIGH (ref 22–26)
CO2 BLDV-SCNC: 33 MMOL/L — HIGH (ref 22–26)
CO2 SERPL-SCNC: 25 MMOL/L — SIGNIFICANT CHANGE UP (ref 22–31)
COLOR SPEC: ABNORMAL
CREAT SERPL-MCNC: 0.84 MG/DL — SIGNIFICANT CHANGE UP (ref 0.5–1.3)
DIFF PNL FLD: ABNORMAL
EOSINOPHIL # BLD AUTO: 0.14 K/UL — SIGNIFICANT CHANGE UP (ref 0–0.5)
EOSINOPHIL NFR BLD AUTO: 0.7 % — SIGNIFICANT CHANGE UP (ref 0–6)
EPI CELLS # UR: 1 /HPF — SIGNIFICANT CHANGE UP
GAS PNL BLDA: SIGNIFICANT CHANGE UP
GAS PNL BLDV: 132 MMOL/L — LOW (ref 136–145)
GAS PNL BLDV: 133 MMOL/L — LOW (ref 136–145)
GAS PNL BLDV: 134 MMOL/L — LOW (ref 136–145)
GAS PNL BLDV: SIGNIFICANT CHANGE UP
GLUCOSE BLDV-MCNC: 113 MG/DL — HIGH (ref 70–99)
GLUCOSE BLDV-MCNC: 127 MG/DL — HIGH (ref 70–99)
GLUCOSE BLDV-MCNC: 166 MG/DL — HIGH (ref 70–99)
GLUCOSE SERPL-MCNC: 123 MG/DL — HIGH (ref 70–99)
GLUCOSE UR QL: NEGATIVE — SIGNIFICANT CHANGE UP
HCO3 BLDV-SCNC: 28 MMOL/L — SIGNIFICANT CHANGE UP (ref 22–29)
HCO3 BLDV-SCNC: 30 MMOL/L — HIGH (ref 22–29)
HCO3 BLDV-SCNC: 32 MMOL/L — HIGH (ref 22–29)
HCT VFR BLD CALC: 29.5 % — LOW (ref 39–50)
HCT VFR BLDA CALC: 26 % — LOW (ref 39–51)
HCT VFR BLDA CALC: 30 % — LOW (ref 39–51)
HCT VFR BLDA CALC: 31 % — LOW (ref 39–51)
HGB BLD CALC-MCNC: 10.1 G/DL — LOW (ref 12.6–17.4)
HGB BLD CALC-MCNC: 10.3 G/DL — LOW (ref 12.6–17.4)
HGB BLD CALC-MCNC: 8.5 G/DL — LOW (ref 12.6–17.4)
HGB BLD-MCNC: 9.9 G/DL — LOW (ref 13–17)
HOROWITZ INDEX BLDV+IHG-RTO: 50 — SIGNIFICANT CHANGE UP
HYALINE CASTS # UR AUTO: 1 /LPF — SIGNIFICANT CHANGE UP (ref 0–2)
IMM GRANULOCYTES NFR BLD AUTO: 0.6 % — SIGNIFICANT CHANGE UP (ref 0–1.5)
KETONES UR-MCNC: NEGATIVE — SIGNIFICANT CHANGE UP
LACTATE BLDV-MCNC: 1.1 MMOL/L — SIGNIFICANT CHANGE UP (ref 0.7–2)
LACTATE BLDV-MCNC: 1.2 MMOL/L — SIGNIFICANT CHANGE UP (ref 0.7–2)
LACTATE BLDV-MCNC: 2.5 MMOL/L — HIGH (ref 0.7–2)
LEUKOCYTE ESTERASE UR-ACNC: NEGATIVE — SIGNIFICANT CHANGE UP
LYMPHOCYTES # BLD AUTO: 1.76 K/UL — SIGNIFICANT CHANGE UP (ref 1–3.3)
LYMPHOCYTES # BLD AUTO: 8.8 % — LOW (ref 13–44)
MAGNESIUM SERPL-MCNC: 2.4 MG/DL — SIGNIFICANT CHANGE UP (ref 1.6–2.6)
MCHC RBC-ENTMCNC: 30.1 PG — SIGNIFICANT CHANGE UP (ref 27–34)
MCHC RBC-ENTMCNC: 33.6 GM/DL — SIGNIFICANT CHANGE UP (ref 32–36)
MCV RBC AUTO: 89.7 FL — SIGNIFICANT CHANGE UP (ref 80–100)
MONOCYTES # BLD AUTO: 1.46 K/UL — HIGH (ref 0–0.9)
MONOCYTES NFR BLD AUTO: 7.3 % — SIGNIFICANT CHANGE UP (ref 2–14)
NEUTROPHILS # BLD AUTO: 16.51 K/UL — HIGH (ref 1.8–7.4)
NEUTROPHILS NFR BLD AUTO: 82.5 % — HIGH (ref 43–77)
NITRITE UR-MCNC: NEGATIVE — SIGNIFICANT CHANGE UP
NRBC # BLD: 0 /100 WBCS — SIGNIFICANT CHANGE UP (ref 0–0)
PA ADP PRP-ACNC: 335 PRU — SIGNIFICANT CHANGE UP (ref 194–417)
PCO2 BLDV: 47 MMHG — SIGNIFICANT CHANGE UP (ref 42–55)
PCO2 BLDV: 50 MMHG — SIGNIFICANT CHANGE UP (ref 42–55)
PCO2 BLDV: 51 MMHG — SIGNIFICANT CHANGE UP (ref 42–55)
PH BLDV: 7.38 — SIGNIFICANT CHANGE UP (ref 7.32–7.43)
PH BLDV: 7.39 — SIGNIFICANT CHANGE UP (ref 7.32–7.43)
PH BLDV: 7.41 — SIGNIFICANT CHANGE UP (ref 7.32–7.43)
PH UR: 6 — SIGNIFICANT CHANGE UP (ref 5–8)
PHOSPHATE SERPL-MCNC: 2.5 MG/DL — SIGNIFICANT CHANGE UP (ref 2.5–4.5)
PLATELET # BLD AUTO: 139 K/UL — LOW (ref 150–400)
PO2 BLDV: 33 MMHG — SIGNIFICANT CHANGE UP (ref 25–45)
PO2 BLDV: 35 MMHG — SIGNIFICANT CHANGE UP (ref 25–45)
PO2 BLDV: 41 MMHG — SIGNIFICANT CHANGE UP (ref 25–45)
POTASSIUM BLDV-SCNC: 3.9 MMOL/L — SIGNIFICANT CHANGE UP (ref 3.5–5.1)
POTASSIUM BLDV-SCNC: 4 MMOL/L — SIGNIFICANT CHANGE UP (ref 3.5–5.1)
POTASSIUM BLDV-SCNC: 4.3 MMOL/L — SIGNIFICANT CHANGE UP (ref 3.5–5.1)
POTASSIUM SERPL-MCNC: 4.1 MMOL/L — SIGNIFICANT CHANGE UP (ref 3.5–5.3)
POTASSIUM SERPL-SCNC: 4.1 MMOL/L — SIGNIFICANT CHANGE UP (ref 3.5–5.3)
PROT SERPL-MCNC: 6.7 G/DL — SIGNIFICANT CHANGE UP (ref 6–8.3)
PROT UR-MCNC: ABNORMAL
RBC # BLD: 3.29 M/UL — LOW (ref 4.2–5.8)
RBC # FLD: 13.5 % — SIGNIFICANT CHANGE UP (ref 10.3–14.5)
RBC CASTS # UR COMP ASSIST: 8 /HPF — HIGH (ref 0–4)
SAO2 % BLDV: 60.9 % — LOW (ref 67–88)
SAO2 % BLDV: 62.6 % — LOW (ref 67–88)
SAO2 % BLDV: 69.8 % — SIGNIFICANT CHANGE UP (ref 67–88)
SODIUM SERPL-SCNC: 134 MMOL/L — LOW (ref 135–145)
SP GR SPEC: 1.03 — HIGH (ref 1.01–1.02)
UROBILINOGEN FLD QL: NEGATIVE — SIGNIFICANT CHANGE UP
WBC # BLD: 20 K/UL — HIGH (ref 3.8–10.5)
WBC # FLD AUTO: 20 K/UL — HIGH (ref 3.8–10.5)
WBC UR QL: 3 /HPF — SIGNIFICANT CHANGE UP (ref 0–5)

## 2021-09-05 PROCEDURE — 71045 X-RAY EXAM CHEST 1 VIEW: CPT | Mod: 26,76

## 2021-09-05 PROCEDURE — 99291 CRITICAL CARE FIRST HOUR: CPT

## 2021-09-05 PROCEDURE — 93010 ELECTROCARDIOGRAM REPORT: CPT

## 2021-09-05 RX ORDER — HYDROMORPHONE HYDROCHLORIDE 2 MG/ML
0.5 INJECTION INTRAMUSCULAR; INTRAVENOUS; SUBCUTANEOUS ONCE
Refills: 0 | Status: DISCONTINUED | OUTPATIENT
Start: 2021-09-05 | End: 2021-09-05

## 2021-09-05 RX ORDER — INSULIN LISPRO 100/ML
VIAL (ML) SUBCUTANEOUS
Refills: 0 | Status: DISCONTINUED | OUTPATIENT
Start: 2021-09-05 | End: 2021-09-09

## 2021-09-05 RX ORDER — METOPROLOL TARTRATE 50 MG
12.5 TABLET ORAL EVERY 12 HOURS
Refills: 0 | Status: DISCONTINUED | OUTPATIENT
Start: 2021-09-05 | End: 2021-09-05

## 2021-09-05 RX ORDER — POTASSIUM CHLORIDE 20 MEQ
20 PACKET (EA) ORAL ONCE
Refills: 0 | Status: COMPLETED | OUTPATIENT
Start: 2021-09-05 | End: 2021-09-05

## 2021-09-05 RX ORDER — ACETAMINOPHEN 500 MG
1000 TABLET ORAL ONCE
Refills: 0 | Status: COMPLETED | OUTPATIENT
Start: 2021-09-05 | End: 2021-09-05

## 2021-09-05 RX ORDER — FUROSEMIDE 40 MG
20 TABLET ORAL ONCE
Refills: 0 | Status: COMPLETED | OUTPATIENT
Start: 2021-09-05 | End: 2021-09-05

## 2021-09-05 RX ORDER — HYDROMORPHONE HYDROCHLORIDE 2 MG/ML
1 INJECTION INTRAMUSCULAR; INTRAVENOUS; SUBCUTANEOUS ONCE
Refills: 0 | Status: DISCONTINUED | OUTPATIENT
Start: 2021-09-05 | End: 2021-09-05

## 2021-09-05 RX ORDER — METOCLOPRAMIDE HCL 10 MG
10 TABLET ORAL EVERY 8 HOURS
Refills: 0 | Status: COMPLETED | OUTPATIENT
Start: 2021-09-05 | End: 2021-09-06

## 2021-09-05 RX ORDER — TICAGRELOR 90 MG/1
90 TABLET ORAL DAILY
Refills: 0 | Status: DISCONTINUED | OUTPATIENT
Start: 2021-09-06 | End: 2021-09-07

## 2021-09-05 RX ORDER — ALBUMIN HUMAN 25 %
250 VIAL (ML) INTRAVENOUS ONCE
Refills: 0 | Status: COMPLETED | OUTPATIENT
Start: 2021-09-05 | End: 2021-09-05

## 2021-09-05 RX ADMIN — HYDROMORPHONE HYDROCHLORIDE 0.5 MILLIGRAM(S): 2 INJECTION INTRAMUSCULAR; INTRAVENOUS; SUBCUTANEOUS at 17:52

## 2021-09-05 RX ADMIN — HYDROMORPHONE HYDROCHLORIDE 1 MILLIGRAM(S): 2 INJECTION INTRAMUSCULAR; INTRAVENOUS; SUBCUTANEOUS at 02:16

## 2021-09-05 RX ADMIN — Medication 3 MILLILITER(S): at 11:25

## 2021-09-05 RX ADMIN — HYDROMORPHONE HYDROCHLORIDE 1 MILLIGRAM(S): 2 INJECTION INTRAMUSCULAR; INTRAVENOUS; SUBCUTANEOUS at 01:00

## 2021-09-05 RX ADMIN — Medication 1000 MILLIGRAM(S): at 18:00

## 2021-09-05 RX ADMIN — CLOPIDOGREL BISULFATE 75 MILLIGRAM(S): 75 TABLET, FILM COATED ORAL at 12:18

## 2021-09-05 RX ADMIN — Medication 650 MILLIGRAM(S): at 14:00

## 2021-09-05 RX ADMIN — Medication 3 MILLILITER(S): at 00:09

## 2021-09-05 RX ADMIN — Medication 10 MILLIGRAM(S): at 22:09

## 2021-09-05 RX ADMIN — Medication 1000 MILLIGRAM(S): at 04:55

## 2021-09-05 RX ADMIN — CHLORHEXIDINE GLUCONATE 1 APPLICATION(S): 213 SOLUTION TOPICAL at 05:20

## 2021-09-05 RX ADMIN — ATORVASTATIN CALCIUM 80 MILLIGRAM(S): 80 TABLET, FILM COATED ORAL at 12:18

## 2021-09-05 RX ADMIN — PANTOPRAZOLE SODIUM 40 MILLIGRAM(S): 20 TABLET, DELAYED RELEASE ORAL at 12:19

## 2021-09-05 RX ADMIN — Medication 125 MILLILITER(S): at 14:45

## 2021-09-05 RX ADMIN — OXYCODONE HYDROCHLORIDE 5 MILLIGRAM(S): 5 TABLET ORAL at 10:40

## 2021-09-05 RX ADMIN — HYDROMORPHONE HYDROCHLORIDE 1 MILLIGRAM(S): 2 INJECTION INTRAMUSCULAR; INTRAVENOUS; SUBCUTANEOUS at 02:30

## 2021-09-05 RX ADMIN — HYDROMORPHONE HYDROCHLORIDE 0.5 MILLIGRAM(S): 2 INJECTION INTRAMUSCULAR; INTRAVENOUS; SUBCUTANEOUS at 00:00

## 2021-09-05 RX ADMIN — Medication 81 MILLIGRAM(S): at 12:18

## 2021-09-05 RX ADMIN — Medication 10 MILLIGRAM(S): at 05:20

## 2021-09-05 RX ADMIN — Medication 3 MILLILITER(S): at 05:20

## 2021-09-05 RX ADMIN — Medication 20 MILLIEQUIVALENT(S): at 18:51

## 2021-09-05 RX ADMIN — HYDROMORPHONE HYDROCHLORIDE 0.5 MILLIGRAM(S): 2 INJECTION INTRAMUSCULAR; INTRAVENOUS; SUBCUTANEOUS at 18:46

## 2021-09-05 RX ADMIN — VASOPRESSIN 3 UNIT(S)/MIN: 20 INJECTION INTRAVENOUS at 22:08

## 2021-09-05 RX ADMIN — Medication 2: at 12:19

## 2021-09-05 RX ADMIN — POLYETHYLENE GLYCOL 3350 17 GRAM(S): 17 POWDER, FOR SOLUTION ORAL at 12:18

## 2021-09-05 RX ADMIN — Medication 3 MILLILITER(S): at 17:23

## 2021-09-05 RX ADMIN — Medication 10 MILLIGRAM(S): at 13:11

## 2021-09-05 RX ADMIN — Medication 400 MILLIGRAM(S): at 17:46

## 2021-09-05 RX ADMIN — Medication 20 MILLIGRAM(S): at 09:44

## 2021-09-05 RX ADMIN — Medication 400 MILLIGRAM(S): at 04:43

## 2021-09-05 RX ADMIN — BUPROPION HYDROCHLORIDE 150 MILLIGRAM(S): 150 TABLET, EXTENDED RELEASE ORAL at 09:44

## 2021-09-05 RX ADMIN — HYDROMORPHONE HYDROCHLORIDE 1 MILLIGRAM(S): 2 INJECTION INTRAMUSCULAR; INTRAVENOUS; SUBCUTANEOUS at 04:44

## 2021-09-05 RX ADMIN — OXYCODONE HYDROCHLORIDE 5 MILLIGRAM(S): 5 TABLET ORAL at 10:11

## 2021-09-05 RX ADMIN — HYDROMORPHONE HYDROCHLORIDE 1 MILLIGRAM(S): 2 INJECTION INTRAMUSCULAR; INTRAVENOUS; SUBCUTANEOUS at 00:45

## 2021-09-05 RX ADMIN — ENOXAPARIN SODIUM 40 MILLIGRAM(S): 100 INJECTION SUBCUTANEOUS at 12:18

## 2021-09-05 RX ADMIN — HYDROMORPHONE HYDROCHLORIDE 1 MILLIGRAM(S): 2 INJECTION INTRAMUSCULAR; INTRAVENOUS; SUBCUTANEOUS at 04:55

## 2021-09-05 RX ADMIN — Medication 650 MILLIGRAM(S): at 13:11

## 2021-09-05 RX ADMIN — Medication 3 MILLILITER(S): at 23:26

## 2021-09-05 NOTE — PROGRESS NOTE ADULT - SUBJECTIVE AND OBJECTIVE BOX
MICHI ESTEVEZ  MRN-[id number]  Patient is a 41y old  Male who presents with a chief complaint of shoulder pain, transfer from [facility] , cabg eval (04 Sep 2021 14:24)    HPI:  40 y/o M with no pmhx current smoker 20 pack year history presented for R shoulder pain started acutely approx a week ago. The patient was lifting heavy objects in his house before onset of pain. Pain started on the R neck and would radiate downward towards his arms. Denies trauma to the area. Denies weakness to the R arm. Pain is sharp like. Worse with movement when abducting his arms and flexion upward. Pain is constant. Denies numbness or tingling. Physical exam at bedside showed reproducible R shoulder/arm pain when the patient's head is extended backward and R arm raised upward.      He is also an active smoker. Patient is admitted for ACS work up after presenting to the ED. Currently denies active chest pain. However he does endorse occasional pressure-like chest pain when he is running. Active smoker of 1ppk/day. Denies hx of HLD, HTN or DM. Reports. Does not see a cardiologist.     Upon ACS work up, s/p LHC which revealed,     Denies hx of surgeries or metal in body. Does not take daily medications. (30 Aug 2021 18:38)      Surgery/Hospital course:  9/3 C1V w/ additional flow from LIMA to SVG. Composite graft.    Today/Overnight:  Patient is POD# 1 from a CABG requiring IV Vasopressin and Dobutamine for inotropic support.    ICU Vital Signs Last 24 Hrs  T(C): 37.6 (05 Sep 2021 08:00), Max: 38.1 (04 Sep 2021 16:00)  T(F): 99.7 (05 Sep 2021 08:00), Max: 100.6 (04 Sep 2021 16:00)  HR: 107 (05 Sep 2021 08:30) (87 - 129)  BP: 106/71 (04 Sep 2021 18:00) (106/71 - 106/71)  BP(mean): 80 (04 Sep 2021 18:00) (80 - 80)  ABP: 113/58 (05 Sep 2021 08:30) (42/42 - 151/62)  ABP(mean): 74 (05 Sep 2021 08:30) (44 - 134)  RR: 26 (05 Sep 2021 08:30) (10 - 63)  SpO2: 100% (05 Sep 2021 08:30) (89% - 100%)    ============================I/O===========================    I&O's Detail    04 Sep 2021 07:01  -  05 Sep 2021 07:00  --------------------------------------------------------  IN:    Albumin 5%  - 250 mL: 250 mL    DOBUTamine: 16.5 mL    DOBUTamine: 6.6 mL    DOBUTamine: 53.6 mL    DOBUTamine: 35.2 mL    Insulin: 51.5 mL    IV PiggyBack: 100 mL    Norepinephrine: 9.7 mL    Oral Fluid: 270 mL    PRBCs (Packed Red Blood Cells): 350 mL    sodium chloride 0.9%: 240 mL    Vasopressin: 35 mL  Total IN: 1418.1 mL    OUT:    Chest Tube (mL): 180 mL    Chest Tube (mL): 170 mL    Indwelling Catheter - Urethral (mL): 2420 mL  Total OUT: 2770 mL    Total NET: -1351.9 mL      05 Sep 2021 07:01  -  05 Sep 2021 09:09  --------------------------------------------------------  IN:    DOBUTamine: 4.4 mL    Insulin: 7 mL    sodium chloride 0.9%: 20 mL    Vasopressin: 4 mL  Total IN: 35.4 mL    OUT:    Indwelling Catheter - Urethral (mL): 60 mL  Total OUT: 60 mL    Total NET: -24.6 mL        ============================ LABS =========================                        9.9    20.00 )-----------( 139      ( 05 Sep 2021 00:49 )             29.5   09-05    134<L>  |  96  |  14  ----------------------------<  123<H>  4.1   |  25  |  0.84    Ca    9.4      05 Sep 2021 00:49  Phos  2.5     09-05  Mg     2.4     09-05    TPro  6.7  /  Alb  4.6  /  TBili  1.4<H>  /  DBili  x   /  AST  70<H>  /  ALT  42  /  AlkPhos  44  09-05      ======================Micro/Rad/Cardio=================  CXR: Reviewed  Echo: Reviewed  ======================================================  PAST MEDICAL & SURGICAL HISTORY:  Family history of hypertension in mother    No significant past surgical history      ========================ASSESSMENT ================  Coronary Artery Disease status post Coronary Artery Bypass Graft on 9/3/21  Stress Hyperglycemia   Thrombocytopenia  Hypovolemic shock  Acute postoperative respiratory insufficiency    Plan:  ====================== NEUROLOGY=====================  Continue to monitor neuro status per ICU protocol.   Continue with Tylenol and Oxycodone for pain management   C/w Bupropion for smoking cessation    acetaminophen   Tablet .. 650 milliGRAM(s) Oral every 6 hours PRN Mild Pain (1 - 3)  buPROPion SR (12-Hour) 150 milliGRAM(s) Oral <User Schedule>  oxyCODONE    IR 5 milliGRAM(s) Oral every 4 hours PRN Moderate Pain (4 - 6)    ==================== RESPIRATORY======================  Extubated yesterday to room air, SpO2 99%  Continue to monitor SpO2 via pulse oximetry  Encourage bedside spirometry   C/w bronchodilators  Aggressive chest PT, pulm toilet    albuterol/ipratropium for Nebulization 3 milliLiter(s) Nebulizer every 6 hours    ====================CARDIOVASCULAR==================  Patient with history of coronary artery disease, subsequently underwent coronary artery bypass grafting procedure 9/3   Patient had acute systolic dysfunction post bypass requiring an IV Dobutamine infusion for inotropic support.  Patient also required volume resuscitation and IV Vasopressin for vasogenic shock.   Continue with ASA, Lipitor, Plavix for graft patency.   Invasive hemodynamic monitoring, assess perfusion indices.       atorvastatin 80 milliGRAM(s) Oral daily  aspirin enteric coated 81 milliGRAM(s) Oral daily  clopidogrel Tablet 75 milliGRAM(s) Oral daily  DOBUTamine Infusion 2 MICROgram(s)/kG/Min (4.43 mL/Hr) IV Continuous <Continuous>  norepinephrine Infusion 0.05 MICROgram(s)/kG/Min (6.93 mL/Hr) IV Continuous <Continuous>  vasopressin Infusion 0.05 Unit(s)/Min (3 mL/Hr) IV Continuous <Continuous>    ===================HEMATOLOGIC/ONC ===================  Acute Blood Loss Anemia and Thrombocytopenia, received 5 Cryoprecipitate intraoperatively  Continue to monitor hemoglobin and hematocrit levels. Transfused 1 PRBC 9/4 evening.  Continue Levonox for venous thromboembolism prophylaxis.     enoxaparin Injectable 40 milliGRAM(s) SubCutaneous daily    ===================== RENAL =========================  Continue to monitor I/Os, BUN/Creatinine, and urine output.   Goal net negative fluid balance. Replete lytes PRN. Keep K> 4 and Mg >2.   c/w IV Lasix for diuresis    furosemide   Injectable 20 milliGRAM(s) IV Push once    ==================== GASTROINTESTINAL===================  Tolerating regular diet  Continue Protonix for stress ulcer prophylaxis.   Bowel regimen with Miralax.   Reglan for gut motility     metoclopramide Injectable 10 milliGRAM(s) IV Push every 8 hours  pantoprazole  Injectable 40 milliGRAM(s) IV Push daily  polyethylene glycol 3350 17 Gram(s) Oral daily  potassium chloride  10 mEq/50 mL IVPB 10 milliEquivalent(s) IV Intermittent every 1 hour  potassium chloride  10 mEq/50 mL IVPB 10 milliEquivalent(s) IV Intermittent every 1 hour  potassium chloride  10 mEq/50 mL IVPB 10 milliEquivalent(s) IV Intermittent every 1 hour  sodium chloride 0.9%. 1000 milliLiter(s) (10 mL/Hr) IV Continuous <Continuous>    =======================    ENDOCRINE  =====================  Metabolic stability, stress hyperglycemia required an IV regular Insulin drip while following serial glucose levels to help achieve and maintain euglycemia.      dextrose 50% Injectable 50 milliLiter(s) IV Push every 15 minutes  dextrose 50% Injectable 25 milliLiter(s) IV Push every 15 minutes  insulin regular Infusion 3 Unit(s)/Hr (3 mL/Hr) IV Continuous <Continuous>    ========================INFECTIOUS DISEASE================  TMAX: 100.6, white blood cells elevated at 29.69->20.16  monitor temperature and trend white blood cells  Continue Cefuroxime for perioperative antibiotic coverage.  Preoperatively patient received Decadron for a diagnosis of cervical radiculopathy and this was felt to be etiology of leukocytosis.    cefuroxime  IVPB 1500 milliGRAM(s) IV Intermittent every 8 hours      Patient requires continuous monitoring with bedside rhythm monitoring, pulse oximetry monitoring, and continuous central venous and arterial pressure monitoring; and intermittent blood gas analysis.  Care plan discussed with ICU care team.    Patient remained critical, at risk for life threatening decompensation.   I have spent 40 minutes providing acute care with multiple re-evaluations throughout the evening.            Lovenox ppx    #FEN  -S/p 1L NS.  -Replete lytes prn.  -Regular diet.    #CODE  -FULL CODE Lovenox ppx    #FEN  -S/p 1.25 L bolus. NS at 100 cc/hr.   -Replete lytes prn.  -Regular diet.    #CODE  -FULL CODE -pt on BP medications per last admission note; restart BP medications if BP remains elevated (was on amlodipine and valsartan)

## 2021-09-06 LAB
ALBUMIN SERPL ELPH-MCNC: 3.8 G/DL — SIGNIFICANT CHANGE UP (ref 3.3–5)
ALP SERPL-CCNC: 59 U/L — SIGNIFICANT CHANGE UP (ref 40–120)
ALT FLD-CCNC: 29 U/L — SIGNIFICANT CHANGE UP (ref 10–45)
ANION GAP SERPL CALC-SCNC: 13 MMOL/L — SIGNIFICANT CHANGE UP (ref 5–17)
AST SERPL-CCNC: 40 U/L — SIGNIFICANT CHANGE UP (ref 10–40)
BASOPHILS # BLD AUTO: 0.02 K/UL — SIGNIFICANT CHANGE UP (ref 0–0.2)
BASOPHILS NFR BLD AUTO: 0.1 % — SIGNIFICANT CHANGE UP (ref 0–2)
BILIRUB SERPL-MCNC: 1.2 MG/DL — SIGNIFICANT CHANGE UP (ref 0.2–1.2)
BUN SERPL-MCNC: 16 MG/DL — SIGNIFICANT CHANGE UP (ref 7–23)
CALCIUM SERPL-MCNC: 9.2 MG/DL — SIGNIFICANT CHANGE UP (ref 8.4–10.5)
CHLORIDE SERPL-SCNC: 98 MMOL/L — SIGNIFICANT CHANGE UP (ref 96–108)
CO2 SERPL-SCNC: 25 MMOL/L — SIGNIFICANT CHANGE UP (ref 22–31)
CREAT SERPL-MCNC: 0.75 MG/DL — SIGNIFICANT CHANGE UP (ref 0.5–1.3)
EOSINOPHIL # BLD AUTO: 0.23 K/UL — SIGNIFICANT CHANGE UP (ref 0–0.5)
EOSINOPHIL NFR BLD AUTO: 1.4 % — SIGNIFICANT CHANGE UP (ref 0–6)
GLUCOSE SERPL-MCNC: 142 MG/DL — HIGH (ref 70–99)
HCT VFR BLD CALC: 23 % — LOW (ref 39–50)
HGB BLD-MCNC: 7.6 G/DL — LOW (ref 13–17)
IMM GRANULOCYTES NFR BLD AUTO: 0.8 % — SIGNIFICANT CHANGE UP (ref 0–1.5)
LYMPHOCYTES # BLD AUTO: 1.76 K/UL — SIGNIFICANT CHANGE UP (ref 1–3.3)
LYMPHOCYTES # BLD AUTO: 10.7 % — LOW (ref 13–44)
MAGNESIUM SERPL-MCNC: 2.3 MG/DL — SIGNIFICANT CHANGE UP (ref 1.6–2.6)
MCHC RBC-ENTMCNC: 29.3 PG — SIGNIFICANT CHANGE UP (ref 27–34)
MCHC RBC-ENTMCNC: 33 GM/DL — SIGNIFICANT CHANGE UP (ref 32–36)
MCV RBC AUTO: 88.8 FL — SIGNIFICANT CHANGE UP (ref 80–100)
MONOCYTES # BLD AUTO: 1.52 K/UL — HIGH (ref 0–0.9)
MONOCYTES NFR BLD AUTO: 9.2 % — SIGNIFICANT CHANGE UP (ref 2–14)
NEUTROPHILS # BLD AUTO: 12.85 K/UL — HIGH (ref 1.8–7.4)
NEUTROPHILS NFR BLD AUTO: 77.8 % — HIGH (ref 43–77)
NRBC # BLD: 0 /100 WBCS — SIGNIFICANT CHANGE UP (ref 0–0)
PHOSPHATE SERPL-MCNC: 1.8 MG/DL — LOW (ref 2.5–4.5)
PLATELET # BLD AUTO: 135 K/UL — LOW (ref 150–400)
POTASSIUM SERPL-MCNC: 3.7 MMOL/L — SIGNIFICANT CHANGE UP (ref 3.5–5.3)
POTASSIUM SERPL-SCNC: 3.7 MMOL/L — SIGNIFICANT CHANGE UP (ref 3.5–5.3)
PROT SERPL-MCNC: 6 G/DL — SIGNIFICANT CHANGE UP (ref 6–8.3)
RBC # BLD: 2.59 M/UL — LOW (ref 4.2–5.8)
RBC # FLD: 13.7 % — SIGNIFICANT CHANGE UP (ref 10.3–14.5)
SODIUM SERPL-SCNC: 136 MMOL/L — SIGNIFICANT CHANGE UP (ref 135–145)
WBC # BLD: 16.51 K/UL — HIGH (ref 3.8–10.5)
WBC # FLD AUTO: 16.51 K/UL — HIGH (ref 3.8–10.5)

## 2021-09-06 PROCEDURE — 99291 CRITICAL CARE FIRST HOUR: CPT

## 2021-09-06 PROCEDURE — 71045 X-RAY EXAM CHEST 1 VIEW: CPT | Mod: 26

## 2021-09-06 RX ORDER — METOPROLOL TARTRATE 50 MG
12.5 TABLET ORAL
Refills: 0 | Status: DISCONTINUED | OUTPATIENT
Start: 2021-09-06 | End: 2021-09-07

## 2021-09-06 RX ORDER — HYDROMORPHONE HYDROCHLORIDE 2 MG/ML
0.5 INJECTION INTRAMUSCULAR; INTRAVENOUS; SUBCUTANEOUS ONCE
Refills: 0 | Status: DISCONTINUED | OUTPATIENT
Start: 2021-09-06 | End: 2021-09-06

## 2021-09-06 RX ORDER — POTASSIUM CHLORIDE 20 MEQ
10 PACKET (EA) ORAL
Refills: 0 | Status: COMPLETED | OUTPATIENT
Start: 2021-09-06 | End: 2021-09-06

## 2021-09-06 RX ADMIN — OXYCODONE HYDROCHLORIDE 5 MILLIGRAM(S): 5 TABLET ORAL at 04:32

## 2021-09-06 RX ADMIN — OXYCODONE HYDROCHLORIDE 5 MILLIGRAM(S): 5 TABLET ORAL at 18:15

## 2021-09-06 RX ADMIN — OXYCODONE HYDROCHLORIDE 5 MILLIGRAM(S): 5 TABLET ORAL at 04:05

## 2021-09-06 RX ADMIN — Medication 10 MILLIGRAM(S): at 05:18

## 2021-09-06 RX ADMIN — HYDROMORPHONE HYDROCHLORIDE 0.5 MILLIGRAM(S): 2 INJECTION INTRAMUSCULAR; INTRAVENOUS; SUBCUTANEOUS at 01:00

## 2021-09-06 RX ADMIN — OXYCODONE HYDROCHLORIDE 5 MILLIGRAM(S): 5 TABLET ORAL at 22:47

## 2021-09-06 RX ADMIN — ATORVASTATIN CALCIUM 80 MILLIGRAM(S): 80 TABLET, FILM COATED ORAL at 11:39

## 2021-09-06 RX ADMIN — POLYETHYLENE GLYCOL 3350 17 GRAM(S): 17 POWDER, FOR SOLUTION ORAL at 11:40

## 2021-09-06 RX ADMIN — ENOXAPARIN SODIUM 40 MILLIGRAM(S): 100 INJECTION SUBCUTANEOUS at 11:39

## 2021-09-06 RX ADMIN — PANTOPRAZOLE SODIUM 40 MILLIGRAM(S): 20 TABLET, DELAYED RELEASE ORAL at 11:40

## 2021-09-06 RX ADMIN — Medication 100 MILLIEQUIVALENT(S): at 01:50

## 2021-09-06 RX ADMIN — OXYCODONE HYDROCHLORIDE 5 MILLIGRAM(S): 5 TABLET ORAL at 08:15

## 2021-09-06 RX ADMIN — Medication 3 MILLILITER(S): at 11:35

## 2021-09-06 RX ADMIN — HYDROMORPHONE HYDROCHLORIDE 0.5 MILLIGRAM(S): 2 INJECTION INTRAMUSCULAR; INTRAVENOUS; SUBCUTANEOUS at 00:30

## 2021-09-06 RX ADMIN — OXYCODONE HYDROCHLORIDE 5 MILLIGRAM(S): 5 TABLET ORAL at 23:35

## 2021-09-06 RX ADMIN — OXYCODONE HYDROCHLORIDE 5 MILLIGRAM(S): 5 TABLET ORAL at 18:45

## 2021-09-06 RX ADMIN — Medication 81 MILLIGRAM(S): at 11:39

## 2021-09-06 RX ADMIN — OXYCODONE HYDROCHLORIDE 5 MILLIGRAM(S): 5 TABLET ORAL at 08:45

## 2021-09-06 RX ADMIN — Medication 3 MILLILITER(S): at 17:10

## 2021-09-06 RX ADMIN — CHLORHEXIDINE GLUCONATE 1 APPLICATION(S): 213 SOLUTION TOPICAL at 05:17

## 2021-09-06 RX ADMIN — OXYCODONE HYDROCHLORIDE 5 MILLIGRAM(S): 5 TABLET ORAL at 14:15

## 2021-09-06 RX ADMIN — Medication 3 MILLILITER(S): at 05:25

## 2021-09-06 RX ADMIN — Medication 100 MILLIEQUIVALENT(S): at 02:27

## 2021-09-06 RX ADMIN — Medication 3 MILLILITER(S): at 23:17

## 2021-09-06 RX ADMIN — Medication 12.5 MILLIGRAM(S): at 08:58

## 2021-09-06 RX ADMIN — TICAGRELOR 90 MILLIGRAM(S): 90 TABLET ORAL at 11:39

## 2021-09-06 RX ADMIN — BUPROPION HYDROCHLORIDE 150 MILLIGRAM(S): 150 TABLET, EXTENDED RELEASE ORAL at 08:12

## 2021-09-06 RX ADMIN — Medication 12.5 MILLIGRAM(S): at 17:08

## 2021-09-06 RX ADMIN — OXYCODONE HYDROCHLORIDE 5 MILLIGRAM(S): 5 TABLET ORAL at 14:45

## 2021-09-06 NOTE — PROGRESS NOTE ADULT - SUBJECTIVE AND OBJECTIVE BOX
MICHI ESTEVEZ  MRN-[id number]  Patient is a 41y old  Male who presents with a chief complaint of shoulder pain, transfer from [facility] , [facility] eval (05 Sep 2021 09:08)    HPI:  42 y/o M with no pmhx current smoker 20 pack year history presented for R shoulder pain started acutely approx a week ago. The patient was lifting heavy objects in his house before onset of pain. Pain started on the R neck and would radiate downward towards his arms. Denies trauma to the area. Denies weakness to the R arm. Pain is sharp like. Worse with movement when abducting his arms and flexion upward. Pain is constant. Denies numbness or tingling. Physical exam at bedside showed reproducible R shoulder/arm pain when the patient's head is extended backward and R arm raised upward.      He is also an active smoker. Patient is admitted for ACS work up after presenting to the ED. Currently denies active chest pain. However he does endorse occasional pressure-like chest pain when he is running. Active smoker of 1ppk/day. Denies hx of HLD, HTN or DM. Reports. Does not see a cardiologist.     Upon ACS work up, s/p LHC which revealed,     Denies hx of surgeries or metal in body. Does not take daily medications. (30 Aug 2021 18:38)      Surgery/Hospital course:   9/3 C1V with addiitonal flow from [facility]    Today:    REVIEW OF SYSTEMS:  Gen: No fever  EYES/ENT: No visual changes;  No vertigo or throat pain   NECK: No pain   RES:  No shortness of breath or Cough  Chest: + incisional pain  CARD: No chest pain   GI: No abdominal pain  : No dysuria  NEURO: No weakness  SKIN: No itching, rashes     Physical Exam:  Vital Signs Last 24 Hrs  T(C): 37.4 (06 Sep 2021 04:00), Max: 38.3 (05 Sep 2021 16:00)  T(F): 99.3 (06 Sep 2021 04:00), Max: 100.9 (05 Sep 2021 16:00)  HR: 105 (06 Sep 2021 06:00) (75 - 118)  BP: --  BP(mean): --  RR: 20 (06 Sep 2021 06:00) (10 - 35)  SpO2: 100% (06 Sep 2021 06:00) (98% - 100%)  Gen:  Awake, alert   CNS: non focal 	  Neck: no JVD  RES : clear , no wheezing    Chest:   + chest tubes                     CVS: Regular  rhythm. Normal S1/S2  Abd: Soft, non-distended. Bowel sounds present.  Skin: No rash.  Ext:  no edema, A Line  ============================I/O===========================   I&O's Detail    04 Sep 2021 07:  -  05 Sep 2021 07:00  --------------------------------------------------------  IN:    Albumin 5%  - 250 mL: 250 mL    DOBUTamine: 16.5 mL    DOBUTamine: 6.6 mL    DOBUTamine: 53.6 mL    DOBUTamine: 35.2 mL    Insulin: 51.5 mL    IV PiggyBack: 100 mL    Norepinephrine: 9.7 mL    Oral Fluid: 270 mL    PRBCs (Packed Red Blood Cells): 350 mL    sodium chloride 0.9%: 240 mL    Vasopressin: 35 mL  Total IN: 1418.1 mL    OUT:    Chest Tube (mL): 180 mL    Chest Tube (mL): 170 mL    Indwelling Catheter - Urethral (mL): 2420 mL  Total OUT: 2770 mL    Total NET: -1351.9 mL      05 Sep 2021 07:  -  06 Sep 2021 06:01  --------------------------------------------------------  IN:    Albumin 5%  - 250 mL: 250 mL    DOBUTamine: 4.4 mL    Insulin: 7 mL    IV PiggyBack: 100 mL    Oral Fluid: 720 mL    sodium chloride 0.9%: 220 mL    Vasopressin: 30 mL  Total IN: 1331.4 mL    OUT:    Indwelling Catheter - Urethral (mL): 875 mL    Voided (mL): 850 mL  Total OUT: 1725 mL    Total NET: -393.6 mL        ============================ LABS =========================                        7.6    16.51 )-----------( 135      ( 06 Sep 2021 00:45 )             23.0     -    136  |  98  |  16  ----------------------------<  142<H>  3.7   |  25  |  0.75    Ca    9.2      06 Sep 2021 00:45  Phos  1.8     -  Mg     2.3         TPro  6.0  /  Alb  3.8  /  TBili  1.2  /  DBili  x   /  AST  40  /  ALT  29  /  AlkPhos  59  09-06    LIVER FUNCTIONS - ( 06 Sep 2021 00:45 )  Alb: 3.8 g/dL / Pro: 6.0 g/dL / ALK PHOS: 59 U/L / ALT: 29 U/L / AST: 40 U/L / GGT: x             ABG - ( 05 Sep 2021 18:12 )  pH, Arterial: 7.47  pH, Blood: x     /  pCO2: 41    /  pO2: 124   / HCO3: 30    / Base Excess: 5.6   /  SaO2: 99.3              Urinalysis Basic - ( 05 Sep 2021 14:47 )    Color: Light Orange / Appearance: Turbid / S.034 / pH: x  Gluc: x / Ketone: Negative  / Bili: Negative / Urobili: Negative   Blood: x / Protein: 30 mg/dL / Nitrite: Negative   Leuk Esterase: Negative / RBC: 8 /hpf / WBC 3 /HPF   Sq Epi: x / Non Sq Epi: 1 /hpf / Bacteria: Negative      ======================Micro/Rad/Cardio=================  CXR: Reviewed  Echo:Reviewed  ======================================================  PAST MEDICAL & SURGICAL HISTORY:  Family history of hypertension in mother    No significant past surgical history      ====================ASSESSMENT ==============  CAD s/p C1V w/ additional flow from [facility] on 9/3   Stress Hyperglycemia   Thrombocytopenia  Hypovolemic shock  Acute postoperative respiratory insufficiency    Plan:  ====================== NEUROLOGY=====================  Continue to monitor neuro status per ICU protocol.   Continue with Tylenol and Oxycodone for pain management   C/w Bupropion for smoking cessation    acetaminophen   Tablet .. 650 milliGRAM(s) Oral every 6 hours PRN Mild Pain (1 - 3)  buPROPion SR (12-Hour) 150 milliGRAM(s) Oral <User Schedule>  oxyCODONE    IR 5 milliGRAM(s) Oral every 4 hours PRN Moderate Pain (4 - 6)    ==================== RESPIRATORY======================  Stable on RA, SpO2 100%  Encourage incentive spirometry, continue pulse ox monitoring, follow ABGs   C/w bronchodilators  Aggressive chest PT, pulm toilet    Mechanical Ventilation:    albuterol/ipratropium for Nebulization 3 milliLiter(s) Nebulizer every 6 hours    ====================CARDIOVASCULAR==================  CAD s/p C1V w/ additional flow from [facility] on 9/3   Continue invasive hemodynamic monitoring   On pressor support with IV Vasopressin   DAPT/Lipitor for graft patency     vasopressin Infusion 0.05 Unit(s)/Min (3 mL/Hr) IV Continuous <Continuous>  aspirin enteric coated 81 milliGRAM(s) Oral daily  atorvastatin 80 milliGRAM(s) Oral daily   ticagrelor 90 milliGRAM(s) Oral daily  ===================HEMATOLOGIC/ONC ===================  Acute Blood Loss Anemia and Thrombocytopenia, received 5 Cryoprecipitate intraoperatively  Continue to monitor hemoglobin and hematocrit levels.Continue Levonox for venous thromboembolism prophylaxis.     enoxaparin Injectable 40 milliGRAM(s) SubCutaneous daily    ===================== RENAL =========================  Gunter for monitoring urine output  Continue to monitor I/Os, BUN/Creatinine.   Goal net negative fluid balance. Replete lytes PRN. Keep K> 4 and Mg >2.       ==================== GASTROINTESTINAL===================  Tolerating regular diet  Continue Protonix for stress ulcer prophylaxis.   Bowel regimen with Miralax.     pantoprazole  Injectable 40 milliGRAM(s) IV Push daily  polyethylene glycol 3350 17 Gram(s) Oral daily  sodium chloride 0.9%. 1000 milliLiter(s) (10 mL/Hr) IV Continuous <Continuous>    =======================    ENDOCRINE  =====================  Metabolic stability, stress hyperglycemia required an ADMELOG sliding scale while following serial glucose levels to help achieve and maintain euglycemia.      dextrose 50% Injectable 50 milliLiter(s) IV Push every 15 minutes  dextrose 50% Injectable 25 milliLiter(s) IV Push every 15 minutes  insulin lispro (ADMELOG) corrective regimen sliding scale   SubCutaneous Before meals and at bedtime    ========================INFECTIOUS DISEASE================  Temp: 99.3, white blood cells decreasing at 20 -> 16.51  monitor temperature and trend white blood cells  Preoperatively patient received Decadron for a diagnosis of cervical radiculopathy and this was felt to be etiology of leukocytosis.      Patient requires continuous monitoring with:  bedside rhythm monitoring, arterial line, pulse oximetry, ventilator management and monitoring; intermittent blood gas analysis.  Care plan discussed with ICU care team.  patient remain critical; required more than usual post op care; I have spent 35 minutes providing non routine post op care, revaluated multiple times during the day .     By signing my name below, I, Yeni Spring, attest that this documentation has been prepared under the direction and in the presence of Arina Sigala MD.  Electronically signed: Bryn Del Rosario, 21 @ 06:01    I, Arina Sigala, personally performed the services described in this documentation. all medical record entries made by the bryn were at my direction and in my presence. I have reviewed the chart and agree that the record reflects my personal performance and is accurate and complete  Electronically signed: Arina Sigala, 21 @ 06:01       MICHI ESTEVEZ  MRN-[id number]  Patient is a 41y old  Male who presents with a chief complaint of shoulder pain, transfer from [facility] , [facility] eval (05 Sep 2021 09:08)    HPI:  42 y/o M with no pmhx current smoker 20 pack year history presented for R shoulder pain started acutely approx a week ago. The patient was lifting heavy objects in his house before onset of pain. Pain started on the R neck and would radiate downward towards his arms. Denies trauma to the area. Denies weakness to the R arm. Pain is sharp like. Worse with movement when abducting his arms and flexion upward. Pain is constant. Denies numbness or tingling. Physical exam at bedside showed reproducible R shoulder/arm pain when the patient's head is extended backward and R arm raised upward.      He is also an active smoker. Patient is admitted for ACS work up after presenting to the ED. Currently denies active chest pain. However he does endorse occasional pressure-like chest pain when he is running. Active smoker of 1ppk/day. Denies hx of HLD, HTN or DM. Reports. Does not see a cardiologist.     Upon ACS work up, s/p LHC which revealed,     Denies hx of surgeries or metal in body. Does not take daily medications. (30 Aug 2021 18:38)      Surgery/Hospital course:   9/3 C1V with addiitonal flow from [facility]    Today:  Switch to Brilinta, pt is a Plavix non responder (P2Y12 of 335). Start Lopressor 12.5mg BID for hr control.    REVIEW OF SYSTEMS:  Gen: No fever  EYES/ENT: No visual changes;  No vertigo or throat pain   NECK: No pain   RES:  No shortness of breath or Cough  Chest: + incisional pain  CARD: No chest pain   GI: No abdominal pain  : No dysuria  NEURO: No weakness  SKIN: No itching, rashes     Physical Exam:  Vital Signs Last 24 Hrs  T(C): 37.4 (06 Sep 2021 04:00), Max: 38.3 (05 Sep 2021 16:00)  T(F): 99.3 (06 Sep 2021 04:00), Max: 100.9 (05 Sep 2021 16:00)  HR: 105 (06 Sep 2021 06:00) (75 - 118)  BP: --  BP(mean): --  RR: 20 (06 Sep 2021 06:00) (10 - 35)  SpO2: 100% (06 Sep 2021 06:00) (98% - 100%)  Gen:  Awake, alert   CNS: non focal 	  Neck: no JVD  RES : clear , no wheezing    Chest:   + chest tubes                     CVS: Regular  rhythm. Normal S1/S2  Abd: Soft, non-distended. Bowel sounds present.  Skin: No rash.  Ext:  no edema, A Line  ============================I/O===========================   I&O's Detail    04 Sep 2021 07:01  -  05 Sep 2021 07:00  --------------------------------------------------------  IN:    Albumin 5%  - 250 mL: 250 mL    DOBUTamine: 16.5 mL    DOBUTamine: 6.6 mL    DOBUTamine: 53.6 mL    DOBUTamine: 35.2 mL    Insulin: 51.5 mL    IV PiggyBack: 100 mL    Norepinephrine: 9.7 mL    Oral Fluid: 270 mL    PRBCs (Packed Red Blood Cells): 350 mL    sodium chloride 0.9%: 240 mL    Vasopressin: 35 mL  Total IN: 1418.1 mL    OUT:    Chest Tube (mL): 180 mL    Chest Tube (mL): 170 mL    Indwelling Catheter - Urethral (mL): 2420 mL  Total OUT: 2770 mL    Total NET: -1351.9 mL      05 Sep 2021 07:01  -  06 Sep 2021 06:01  --------------------------------------------------------  IN:    Albumin 5%  - 250 mL: 250 mL    DOBUTamine: 4.4 mL    Insulin: 7 mL    IV PiggyBack: 100 mL    Oral Fluid: 720 mL    sodium chloride 0.9%: 220 mL    Vasopressin: 30 mL  Total IN: 1331.4 mL    OUT:    Indwelling Catheter - Urethral (mL): 875 mL    Voided (mL): 850 mL  Total OUT: 1725 mL    Total NET: -393.6 mL        ============================ LABS =========================                        7.6    16.51 )-----------( 135      ( 06 Sep 2021 00:45 )             23.0     09-    136  |  98  |  16  ----------------------------<  142<H>  3.7   |  25  |  0.75    Ca    9.2      06 Sep 2021 00:45  Phos  1.8     -  Mg     2.3     -    TPro  6.0  /  Alb  3.8  /  TBili  1.2  /  DBili  x   /  AST  40  /  ALT  29  /  AlkPhos  59  09-06    LIVER FUNCTIONS - ( 06 Sep 2021 00:45 )  Alb: 3.8 g/dL / Pro: 6.0 g/dL / ALK PHOS: 59 U/L / ALT: 29 U/L / AST: 40 U/L / GGT: x             ABG - ( 05 Sep 2021 18:12 )  pH, Arterial: 7.47  pH, Blood: x     /  pCO2: 41    /  pO2: 124   / HCO3: 30    / Base Excess: 5.6   /  SaO2: 99.3              Urinalysis Basic - ( 05 Sep 2021 14:47 )    Color: Light Orange / Appearance: Turbid / S.034 / pH: x  Gluc: x / Ketone: Negative  / Bili: Negative / Urobili: Negative   Blood: x / Protein: 30 mg/dL / Nitrite: Negative   Leuk Esterase: Negative / RBC: 8 /hpf / WBC 3 /HPF   Sq Epi: x / Non Sq Epi: 1 /hpf / Bacteria: Negative      ======================Micro/Rad/Cardio=================  CXR: Reviewed  Echo:Reviewed  ======================================================  PAST MEDICAL & SURGICAL HISTORY:  Family history of hypertension in mother    No significant past surgical history      ====================ASSESSMENT ==============  CAD s/p C1V w/ additional flow from [facility] on 9/3   Stress Hyperglycemia   Thrombocytopenia  Hypovolemic shock  Acute postoperative respiratory insufficiency    Plan:  ====================== NEUROLOGY=====================  Continue to monitor neuro status per ICU protocol.   Continue with Tylenol and Oxycodone for pain management   C/w Bupropion for smoking cessation    acetaminophen   Tablet .. 650 milliGRAM(s) Oral every 6 hours PRN Mild Pain (1 - 3)  buPROPion SR (12-Hour) 150 milliGRAM(s) Oral <User Schedule>  oxyCODONE    IR 5 milliGRAM(s) Oral every 4 hours PRN Moderate Pain (4 - 6)    ==================== RESPIRATORY======================  Stable on RA, SpO2 100%  Encourage incentive spirometry, continue pulse ox monitoring, follow ABGs   C/w bronchodilators  Aggressive chest PT, pulm toilet    Mechanical Ventilation:    albuterol/ipratropium for Nebulization 3 milliLiter(s) Nebulizer every 6 hours    ====================CARDIOVASCULAR==================  CAD s/p C1V w/ additional flow from [facility] on 9/3   Continue invasive hemodynamic monitoring   On pressor support with IV Vasopressin   DAPT/Lipitor for graft patency   Switch to Brilinta, pt is a Plavix non responder (P2Y12 of 335).  Start Lopressor 12.5mg BID for hr control.    vasopressin Infusion 0.05 Unit(s)/Min (3 mL/Hr) IV Continuous <Continuous>  aspirin enteric coated 81 milliGRAM(s) Oral daily  atorvastatin 80 milliGRAM(s) Oral daily   ticagrelor 90 milliGRAM(s) Oral daily  ===================HEMATOLOGIC/ONC ===================  Acute Blood Loss Anemia and Thrombocytopenia, received 5 Cryoprecipitate intraoperatively  Continue to monitor hemoglobin and hematocrit levels.Continue Levonox for venous thromboembolism prophylaxis.     enoxaparin Injectable 40 milliGRAM(s) SubCutaneous daily    ===================== RENAL =========================  Gunter for monitoring urine output  Continue to monitor I/Os, BUN/Creatinine.   Goal net negative fluid balance. Replete lytes PRN. Keep K> 4 and Mg >2.       ==================== GASTROINTESTINAL===================  Tolerating regular diet  Continue Protonix for stress ulcer prophylaxis.   Bowel regimen with Miralax.     pantoprazole  Injectable 40 milliGRAM(s) IV Push daily  polyethylene glycol 3350 17 Gram(s) Oral daily  sodium chloride 0.9%. 1000 milliLiter(s) (10 mL/Hr) IV Continuous <Continuous>    =======================    ENDOCRINE  =====================  Metabolic stability, stress hyperglycemia required an ADMELOG sliding scale while following serial glucose levels to help achieve and maintain euglycemia.      dextrose 50% Injectable 50 milliLiter(s) IV Push every 15 minutes  dextrose 50% Injectable 25 milliLiter(s) IV Push every 15 minutes  insulin lispro (ADMELOG) corrective regimen sliding scale   SubCutaneous Before meals and at bedtime    ========================INFECTIOUS DISEASE================  Temp: 99.3, white blood cells decreasing at 20 -> 16.51  monitor temperature and trend white blood cells  Preoperatively patient received Decadron for a diagnosis of cervical radiculopathy and this was felt to be etiology of leukocytosis.      Patient requires continuous monitoring with:  bedside rhythm monitoring, arterial line, pulse oximetry, ventilator management and monitoring; intermittent blood gas analysis.  Care plan discussed with ICU care team.  patient remain critical; required more than usual post op care; I have spent 35 minutes providing non routine post op care, revaluated multiple times during the day .     By signing my name below, I, Yeni Spring, attest that this documentation has been prepared under the direction and in the presence of Arina Sigala MD.  Electronically signed: Bryn Del Rosario, 21 @ 06:01    I, Arina Sigala, personally performed the services described in this documentation. all medical record entries made by the bryn were at my direction and in my presence. I have reviewed the chart and agree that the record reflects my personal performance and is accurate and complete  Electronically signed: Arina Sigala, 21 @ 06:01       MICHI ESTEVEZ  MRN-[id number]  Patient is a 41y old  Male who presents with a chief complaint of shoulder pain, transfer from [facility] , [facility] eval (05 Sep 2021 09:08)    HPI:  42 y/o M with no pmhx current smoker 20 pack year history presented for R shoulder pain started acutely approx a week ago. The patient was lifting heavy objects in his house before onset of pain. Pain started on the R neck and would radiate downward towards his arms. Denies trauma to the area. Denies weakness to the R arm. Pain is sharp like. Worse with movement when abducting his arms and flexion upward. Pain is constant. Denies numbness or tingling. Physical exam at bedside showed reproducible R shoulder/arm pain when the patient's head is extended backward and R arm raised upward.  He is also an active smoker. Patient is admitted for ACS work up after presenting to the ED. Currently denies active chest pain. However he does endorse occasional pressure-like chest pain when he is running. Active smoker of 1ppk/day. Denies hx of HLD, HTN or DM. Reports. Does not see a cardiologist.     Upon ACS work up, s/p LHC which revealed,     Denies hx of surgeries or metal in body. Does not take daily medications. (30 Aug 2021 18:38)      Surgery/Hospital course:   9/3 C1V with addiitonal flow from [facility]    Today:   high-flow oxygen was weaned to 5 L nasal cannula O2 saturation 98%  Switch to Brilinta, pt is a Plavix non responder (P2Y12 of 335).  Start Lopressor 12.5mg BID for hr control.    REVIEW OF SYSTEMS:  Gen: No fever  EYES/ENT: No visual changes;  No vertigo or throat pain   NECK: No pain   RES:  No shortness of breath or Cough  Chest: + incisional pain  GI: No abdominal pain  : No dysuria  NEURO: No weakness  SKIN: No itching, rashes     Physical Exam:  Vital Signs Last 24 Hrs  T(C): 37.4 (06 Sep 2021 04:00), Max: 38.3 (05 Sep 2021 16:00)  T(F): 99.3 (06 Sep 2021 04:00), Max: 100.9 (05 Sep 2021 16:00)  HR: 105 (06 Sep 2021 06:00) (75 - 118)  BP: --  BP(mean): --  RR: 20 (06 Sep 2021 06:00) (10 - 35)  SpO2: 100% (06 Sep 2021 06:00) (98% - 100%)  Gen:  Awake, alert   CNS: non focal 	  Neck: no JVD  RES : clear , no wheezing                     CVS: Regular  rhythm. Normal S1/S2  Abd: Soft, non-distended. Bowel sounds present.  Skin: No rash.  Ext:  no edema, A Line  ============================I/O===========================   I&O's Detail    04 Sep 2021 07:01  -  05 Sep 2021 07:00  --------------------------------------------------------  IN:    Albumin 5%  - 250 mL: 250 mL    DOBUTamine: 16.5 mL    DOBUTamine: 6.6 mL    DOBUTamine: 53.6 mL    DOBUTamine: 35.2 mL    Insulin: 51.5 mL    IV PiggyBack: 100 mL    Norepinephrine: 9.7 mL    Oral Fluid: 270 mL    PRBCs (Packed Red Blood Cells): 350 mL    sodium chloride 0.9%: 240 mL    Vasopressin: 35 mL  Total IN: 1418.1 mL    OUT:    Chest Tube (mL): 180 mL    Chest Tube (mL): 170 mL    Indwelling Catheter - Urethral (mL): 2420 mL  Total OUT: 2770 mL    Total NET: -1351.9 mL      05 Sep 2021 07:01  -  06 Sep 2021 06:01  --------------------------------------------------------  IN:    Albumin 5%  - 250 mL: 250 mL    DOBUTamine: 4.4 mL    Insulin: 7 mL    IV PiggyBack: 100 mL    Oral Fluid: 720 mL    sodium chloride 0.9%: 220 mL    Vasopressin: 30 mL  Total IN: 1331.4 mL    OUT:    Indwelling Catheter - Urethral (mL): 875 mL    Voided (mL): 850 mL  Total OUT: 1725 mL    Total NET: -393.6 mL        ============================ LABS =========================                        7.6    16.51 )-----------( 135      ( 06 Sep 2021 00:45 )             23.0     09-    136  |  98  |  16  ----------------------------<  142<H>  3.7   |  25  |  0.75    Ca    9.2      06 Sep 2021 00:45  Phos  1.8     09-  Mg     2.3     -    TPro  6.0  /  Alb  3.8  /  TBili  1.2  /  DBili  x   /  AST  40  /  ALT  29  /  AlkPhos  59  09-06    LIVER FUNCTIONS - ( 06 Sep 2021 00:45 )  Alb: 3.8 g/dL / Pro: 6.0 g/dL / ALK PHOS: 59 U/L / ALT: 29 U/L / AST: 40 U/L / GGT: x             ABG - ( 05 Sep 2021 18:12 )  pH, Arterial: 7.47  pH, Blood: x     /  pCO2: 41    /  pO2: 124   / HCO3: 30    / Base Excess: 5.6   /  SaO2: 99.3              Urinalysis Basic - ( 05 Sep 2021 14:47 )    Color: Light Orange / Appearance: Turbid / S.034 / pH: x  Gluc: x / Ketone: Negative  / Bili: Negative / Urobili: Negative   Blood: x / Protein: 30 mg/dL / Nitrite: Negative   Leuk Esterase: Negative / RBC: 8 /hpf / WBC 3 /HPF   Sq Epi: x / Non Sq Epi: 1 /hpf / Bacteria: Negative      ======================Micro/Rad/Cardio=================  CXR: Reviewed  Echo:Reviewed  ======================================================  PAST MEDICAL & SURGICAL HISTORY:  Family history of hypertension in mother    No significant past surgical history      ====================ASSESSMENT ==============  CAD s/p C1V w/ additional flow from [facility] on 9/3   hypoxemia  Anemia blood loss  Stress Hyperglycemia   Thrombocytopenia  Hypovolemic shock    Plan:  ====================== NEUROLOGY=====================  Continue to monitor neuro status per ICU protocol.   Continue with Tylenol and Oxycodone for pain management   C/w Bupropion for smoking cessation    acetaminophen   Tablet .. 650 milliGRAM(s) Oral every 6 hours PRN Mild Pain (1 - 3)  buPROPion SR (12-Hour) 150 milliGRAM(s) Oral <User Schedule>  oxyCODONE    IR 5 milliGRAM(s) Oral every 4 hours PRN Moderate Pain (4 - 6)    ==================== RESPIRATORY======================   respiratory support with high-flow oxygen which was weaned off to 5 L nasal cannula, monitor O2 saturation.  Encourage incentive spirometry, continue pulse ox monitoring, follow ABGs   C/w bronchodilators  Aggressive chest PT, pulm toilet    lbuterol/ipratropium for Nebulization 3 milliLiter(s) Nebulizer every 6 hours    ====================CARDIOVASCULAR==================  CAD s/p C1V w/ additional flow from [facility] on 9/3   Continue invasive hemodynamic monitoring   On pressor support with IV Vasopressin   DAPT/Lipitor for graft patency   Switch to Brilinta, pt is a Plavix non responder (P2Y12 of 335).  Start Lopressor 12.5mg BID for hr control.    vasopressin Infusion 0.05 Unit(s)/Min (3 mL/Hr) IV Continuous <Continuous>  aspirin enteric coated 81 milliGRAM(s) Oral daily  atorvastatin 80 milliGRAM(s) Oral daily   ticagrelor 90 milliGRAM(s) Oral daily  ===================HEMATOLOGIC/ONC ===================  Acute Blood Loss Anemia and Thrombocytopenia, received 5 Cryoprecipitate intraoperatively  Continue to monitor hemoglobin and hematocrit levels.Continue Levonox for venous thromboembolism prophylaxis.     enoxaparin Injectable 40 milliGRAM(s) SubCutaneous daily    ===================== RENAL =========================  [place]   Discontinue  Continue to monitor I/Os, BUN/Creatinine.   Goal net negative fluid balance. Replete lytes PRN. Keep K> 4 and Mg >2.       ==================== GASTROINTESTINAL===================  Tolerating regular diet  Continue Protonix for stress ulcer prophylaxis.   Bowel regimen with Miralax.     pantoprazole  Injectable 40 milliGRAM(s) IV Push daily  polyethylene glycol 3350 17 Gram(s) Oral daily  sodium chloride 0.9%. 1000 milliLiter(s) (10 mL/Hr) IV Continuous <Continuous>    =======================    ENDOCRINE  =====================  Metabolic stability, stress hyperglycemia required an ADMELOG sliding scale while following serial glucose levels to help achieve and maintain euglycemia.      dextrose 50% Injectable 50 milliLiter(s) IV Push every 15 minutes  dextrose 50% Injectable 25 milliLiter(s) IV Push every 15 minutes  insulin lispro (ADMELOG) corrective regimen sliding scale   SubCutaneous Before meals and at bedtime    ========================INFECTIOUS DISEASE================  Temp: 99.3, white blood cells decreasing at 20 -> 16.51  monitor temperature and trend white blood cells  Preoperatively patient received Decadron for a diagnosis of cervical radiculopathy and this was felt to be etiology of leukocytosis.      Patient requires continuous monitoring with:  bedside rhythm monitoring, arterial line, pulse oximetry, ventilator management and monitoring; intermittent blood gas analysis.  Care plan discussed with ICU care team.  patient remain critical; required more than usual post op care; I have spent 35 minutes providing non routine post op care, revaluated multiple times during the day .     By signing my name below, I, Yeni Spring, attest that this documentation has been prepared under the direction and in the presence of Arina Sigala MD.  Electronically signed: Abdulkadir Del Rosario, 21 @ 06:01    I, Arina Sigala, personally performed the services described in this documentation. all medical record entries made by the scribe were at my direction and in my presence. I have reviewed the chart and agree that the record reflects my personal performance and is accurate and complete  Electronically signed: Arina Sigala, 21 @ 06:01

## 2021-09-07 PROBLEM — Z00.00 ENCOUNTER FOR PREVENTIVE HEALTH EXAMINATION: Status: ACTIVE | Noted: 2021-09-07

## 2021-09-07 LAB
ALBUMIN SERPL ELPH-MCNC: 3.6 G/DL — SIGNIFICANT CHANGE UP (ref 3.3–5)
ALP SERPL-CCNC: 88 U/L — SIGNIFICANT CHANGE UP (ref 40–120)
ALT FLD-CCNC: 28 U/L — SIGNIFICANT CHANGE UP (ref 10–45)
ANION GAP SERPL CALC-SCNC: 14 MMOL/L — SIGNIFICANT CHANGE UP (ref 5–17)
AST SERPL-CCNC: 31 U/L — SIGNIFICANT CHANGE UP (ref 10–40)
BASOPHILS # BLD AUTO: 0.02 K/UL — SIGNIFICANT CHANGE UP (ref 0–0.2)
BASOPHILS # BLD AUTO: 0.02 K/UL — SIGNIFICANT CHANGE UP (ref 0–0.2)
BASOPHILS # BLD AUTO: 0.04 K/UL — SIGNIFICANT CHANGE UP (ref 0–0.2)
BASOPHILS NFR BLD AUTO: 0.1 % — SIGNIFICANT CHANGE UP (ref 0–2)
BASOPHILS NFR BLD AUTO: 0.1 % — SIGNIFICANT CHANGE UP (ref 0–2)
BASOPHILS NFR BLD AUTO: 0.2 % — SIGNIFICANT CHANGE UP (ref 0–2)
BILIRUB SERPL-MCNC: 0.8 MG/DL — SIGNIFICANT CHANGE UP (ref 0.2–1.2)
BUN SERPL-MCNC: 14 MG/DL — SIGNIFICANT CHANGE UP (ref 7–23)
CALCIUM SERPL-MCNC: 9.1 MG/DL — SIGNIFICANT CHANGE UP (ref 8.4–10.5)
CHLORIDE SERPL-SCNC: 101 MMOL/L — SIGNIFICANT CHANGE UP (ref 96–108)
CO2 SERPL-SCNC: 22 MMOL/L — SIGNIFICANT CHANGE UP (ref 22–31)
CREAT SERPL-MCNC: 0.75 MG/DL — SIGNIFICANT CHANGE UP (ref 0.5–1.3)
EOSINOPHIL # BLD AUTO: 0.32 K/UL — SIGNIFICANT CHANGE UP (ref 0–0.5)
EOSINOPHIL # BLD AUTO: 0.39 K/UL — SIGNIFICANT CHANGE UP (ref 0–0.5)
EOSINOPHIL # BLD AUTO: 0.48 K/UL — SIGNIFICANT CHANGE UP (ref 0–0.5)
EOSINOPHIL NFR BLD AUTO: 2 % — SIGNIFICANT CHANGE UP (ref 0–6)
EOSINOPHIL NFR BLD AUTO: 2.2 % — SIGNIFICANT CHANGE UP (ref 0–6)
EOSINOPHIL NFR BLD AUTO: 2.4 % — SIGNIFICANT CHANGE UP (ref 0–6)
GLUCOSE SERPL-MCNC: 116 MG/DL — HIGH (ref 70–99)
HCT VFR BLD CALC: 21.6 % — LOW (ref 39–50)
HCT VFR BLD CALC: 22.4 % — LOW (ref 39–50)
HCT VFR BLD CALC: 30.1 % — LOW (ref 39–50)
HGB BLD-MCNC: 7.2 G/DL — LOW (ref 13–17)
HGB BLD-MCNC: 7.4 G/DL — LOW (ref 13–17)
HGB BLD-MCNC: 9.8 G/DL — LOW (ref 13–17)
IMM GRANULOCYTES NFR BLD AUTO: 0.8 % — SIGNIFICANT CHANGE UP (ref 0–1.5)
IMM GRANULOCYTES NFR BLD AUTO: 0.9 % — SIGNIFICANT CHANGE UP (ref 0–1.5)
IMM GRANULOCYTES NFR BLD AUTO: 1.3 % — SIGNIFICANT CHANGE UP (ref 0–1.5)
LYMPHOCYTES # BLD AUTO: 1.74 K/UL — SIGNIFICANT CHANGE UP (ref 1–3.3)
LYMPHOCYTES # BLD AUTO: 10.9 % — LOW (ref 13–44)
LYMPHOCYTES # BLD AUTO: 13.4 % — SIGNIFICANT CHANGE UP (ref 13–44)
LYMPHOCYTES # BLD AUTO: 13.9 % — SIGNIFICANT CHANGE UP (ref 13–44)
LYMPHOCYTES # BLD AUTO: 2.49 K/UL — SIGNIFICANT CHANGE UP (ref 1–3.3)
LYMPHOCYTES # BLD AUTO: 2.68 K/UL — SIGNIFICANT CHANGE UP (ref 1–3.3)
MAGNESIUM SERPL-MCNC: 2.3 MG/DL — SIGNIFICANT CHANGE UP (ref 1.6–2.6)
MCHC RBC-ENTMCNC: 29.9 PG — SIGNIFICANT CHANGE UP (ref 27–34)
MCHC RBC-ENTMCNC: 30.5 PG — SIGNIFICANT CHANGE UP (ref 27–34)
MCHC RBC-ENTMCNC: 30.5 PG — SIGNIFICANT CHANGE UP (ref 27–34)
MCHC RBC-ENTMCNC: 32.6 GM/DL — SIGNIFICANT CHANGE UP (ref 32–36)
MCHC RBC-ENTMCNC: 33 GM/DL — SIGNIFICANT CHANGE UP (ref 32–36)
MCHC RBC-ENTMCNC: 33.3 GM/DL — SIGNIFICANT CHANGE UP (ref 32–36)
MCV RBC AUTO: 89.6 FL — SIGNIFICANT CHANGE UP (ref 80–100)
MCV RBC AUTO: 92.2 FL — SIGNIFICANT CHANGE UP (ref 80–100)
MCV RBC AUTO: 93.8 FL — SIGNIFICANT CHANGE UP (ref 80–100)
MONOCYTES # BLD AUTO: 1.25 K/UL — HIGH (ref 0–0.9)
MONOCYTES # BLD AUTO: 1.55 K/UL — HIGH (ref 0–0.9)
MONOCYTES # BLD AUTO: 1.77 K/UL — HIGH (ref 0–0.9)
MONOCYTES NFR BLD AUTO: 7.8 % — SIGNIFICANT CHANGE UP (ref 2–14)
MONOCYTES NFR BLD AUTO: 8.7 % — SIGNIFICANT CHANGE UP (ref 2–14)
MONOCYTES NFR BLD AUTO: 8.9 % — SIGNIFICANT CHANGE UP (ref 2–14)
NEUTROPHILS # BLD AUTO: 12.48 K/UL — HIGH (ref 1.8–7.4)
NEUTROPHILS # BLD AUTO: 13.25 K/UL — HIGH (ref 1.8–7.4)
NEUTROPHILS # BLD AUTO: 14.75 K/UL — HIGH (ref 1.8–7.4)
NEUTROPHILS NFR BLD AUTO: 73.8 % — SIGNIFICANT CHANGE UP (ref 43–77)
NEUTROPHILS NFR BLD AUTO: 74.3 % — SIGNIFICANT CHANGE UP (ref 43–77)
NEUTROPHILS NFR BLD AUTO: 78.3 % — HIGH (ref 43–77)
NRBC # BLD: 0 /100 WBCS — SIGNIFICANT CHANGE UP (ref 0–0)
PHOSPHATE SERPL-MCNC: 1.5 MG/DL — LOW (ref 2.5–4.5)
PLATELET # BLD AUTO: 199 K/UL — SIGNIFICANT CHANGE UP (ref 150–400)
PLATELET # BLD AUTO: 224 K/UL — SIGNIFICANT CHANGE UP (ref 150–400)
PLATELET # BLD AUTO: 232 K/UL — SIGNIFICANT CHANGE UP (ref 150–400)
POTASSIUM SERPL-MCNC: 3.7 MMOL/L — SIGNIFICANT CHANGE UP (ref 3.5–5.3)
POTASSIUM SERPL-SCNC: 3.7 MMOL/L — SIGNIFICANT CHANGE UP (ref 3.5–5.3)
PROT SERPL-MCNC: 6 G/DL — SIGNIFICANT CHANGE UP (ref 6–8.3)
RBC # BLD: 2.41 M/UL — LOW (ref 4.2–5.8)
RBC # BLD: 2.43 M/UL — LOW (ref 4.2–5.8)
RBC # BLD: 3.21 M/UL — LOW (ref 4.2–5.8)
RBC # FLD: 13.6 % — SIGNIFICANT CHANGE UP (ref 10.3–14.5)
RBC # FLD: 13.7 % — SIGNIFICANT CHANGE UP (ref 10.3–14.5)
RBC # FLD: 13.8 % — SIGNIFICANT CHANGE UP (ref 10.3–14.5)
SODIUM SERPL-SCNC: 137 MMOL/L — SIGNIFICANT CHANGE UP (ref 135–145)
WBC # BLD: 15.96 K/UL — HIGH (ref 3.8–10.5)
WBC # BLD: 17.85 K/UL — HIGH (ref 3.8–10.5)
WBC # BLD: 19.97 K/UL — HIGH (ref 3.8–10.5)
WBC # FLD AUTO: 15.96 K/UL — HIGH (ref 3.8–10.5)
WBC # FLD AUTO: 17.85 K/UL — HIGH (ref 3.8–10.5)
WBC # FLD AUTO: 19.97 K/UL — HIGH (ref 3.8–10.5)

## 2021-09-07 PROCEDURE — 99233 SBSQ HOSP IP/OBS HIGH 50: CPT

## 2021-09-07 PROCEDURE — 71045 X-RAY EXAM CHEST 1 VIEW: CPT | Mod: 26

## 2021-09-07 RX ORDER — METOPROLOL TARTRATE 50 MG
25 TABLET ORAL EVERY 12 HOURS
Refills: 0 | Status: DISCONTINUED | OUTPATIENT
Start: 2021-09-07 | End: 2021-09-07

## 2021-09-07 RX ORDER — METOPROLOL TARTRATE 50 MG
25 TABLET ORAL ONCE
Refills: 0 | Status: COMPLETED | OUTPATIENT
Start: 2021-09-07 | End: 2021-09-07

## 2021-09-07 RX ORDER — METOPROLOL TARTRATE 50 MG
50 TABLET ORAL EVERY 8 HOURS
Refills: 0 | Status: DISCONTINUED | OUTPATIENT
Start: 2021-09-07 | End: 2021-09-08

## 2021-09-07 RX ORDER — SENNA PLUS 8.6 MG/1
2 TABLET ORAL AT BEDTIME
Refills: 0 | Status: DISCONTINUED | OUTPATIENT
Start: 2021-09-07 | End: 2021-09-09

## 2021-09-07 RX ORDER — POTASSIUM CHLORIDE 20 MEQ
40 PACKET (EA) ORAL ONCE
Refills: 0 | Status: COMPLETED | OUTPATIENT
Start: 2021-09-07 | End: 2021-09-07

## 2021-09-07 RX ORDER — TICAGRELOR 90 MG/1
90 TABLET ORAL EVERY 12 HOURS
Refills: 0 | Status: DISCONTINUED | OUTPATIENT
Start: 2021-09-07 | End: 2021-09-09

## 2021-09-07 RX ORDER — PANTOPRAZOLE SODIUM 20 MG/1
40 TABLET, DELAYED RELEASE ORAL
Refills: 0 | Status: DISCONTINUED | OUTPATIENT
Start: 2021-09-07 | End: 2021-09-09

## 2021-09-07 RX ORDER — SODIUM CHLORIDE 9 MG/ML
3 INJECTION INTRAMUSCULAR; INTRAVENOUS; SUBCUTANEOUS EVERY 8 HOURS
Refills: 0 | Status: DISCONTINUED | OUTPATIENT
Start: 2021-09-07 | End: 2021-09-09

## 2021-09-07 RX ADMIN — OXYCODONE HYDROCHLORIDE 5 MILLIGRAM(S): 5 TABLET ORAL at 15:03

## 2021-09-07 RX ADMIN — Medication 3 MILLILITER(S): at 05:32

## 2021-09-07 RX ADMIN — OXYCODONE HYDROCHLORIDE 5 MILLIGRAM(S): 5 TABLET ORAL at 07:26

## 2021-09-07 RX ADMIN — Medication 25 MILLIGRAM(S): at 09:11

## 2021-09-07 RX ADMIN — OXYCODONE HYDROCHLORIDE 5 MILLIGRAM(S): 5 TABLET ORAL at 05:29

## 2021-09-07 RX ADMIN — TICAGRELOR 90 MILLIGRAM(S): 90 TABLET ORAL at 11:37

## 2021-09-07 RX ADMIN — Medication 650 MILLIGRAM(S): at 14:33

## 2021-09-07 RX ADMIN — Medication 3 MILLILITER(S): at 11:38

## 2021-09-07 RX ADMIN — CHLORHEXIDINE GLUCONATE 1 APPLICATION(S): 213 SOLUTION TOPICAL at 07:25

## 2021-09-07 RX ADMIN — BUPROPION HYDROCHLORIDE 150 MILLIGRAM(S): 150 TABLET, EXTENDED RELEASE ORAL at 07:25

## 2021-09-07 RX ADMIN — Medication 50 MILLIGRAM(S): at 13:17

## 2021-09-07 RX ADMIN — SODIUM CHLORIDE 3 MILLILITER(S): 9 INJECTION INTRAMUSCULAR; INTRAVENOUS; SUBCUTANEOUS at 21:48

## 2021-09-07 RX ADMIN — BUPROPION HYDROCHLORIDE 150 MILLIGRAM(S): 150 TABLET, EXTENDED RELEASE ORAL at 19:52

## 2021-09-07 RX ADMIN — Medication 50 MILLIGRAM(S): at 21:51

## 2021-09-07 RX ADMIN — Medication 40 MILLIEQUIVALENT(S): at 05:29

## 2021-09-07 RX ADMIN — POLYETHYLENE GLYCOL 3350 17 GRAM(S): 17 POWDER, FOR SOLUTION ORAL at 11:36

## 2021-09-07 RX ADMIN — ENOXAPARIN SODIUM 40 MILLIGRAM(S): 100 INJECTION SUBCUTANEOUS at 11:36

## 2021-09-07 RX ADMIN — SODIUM CHLORIDE 3 MILLILITER(S): 9 INJECTION INTRAMUSCULAR; INTRAVENOUS; SUBCUTANEOUS at 13:08

## 2021-09-07 RX ADMIN — Medication 25 MILLIGRAM(S): at 05:30

## 2021-09-07 RX ADMIN — TICAGRELOR 90 MILLIGRAM(S): 90 TABLET ORAL at 17:12

## 2021-09-07 RX ADMIN — PANTOPRAZOLE SODIUM 40 MILLIGRAM(S): 20 TABLET, DELAYED RELEASE ORAL at 07:26

## 2021-09-07 RX ADMIN — ATORVASTATIN CALCIUM 80 MILLIGRAM(S): 80 TABLET, FILM COATED ORAL at 11:37

## 2021-09-07 RX ADMIN — Medication 83.33 MILLIMOLE(S): at 05:30

## 2021-09-07 RX ADMIN — OXYCODONE HYDROCHLORIDE 5 MILLIGRAM(S): 5 TABLET ORAL at 14:33

## 2021-09-07 RX ADMIN — OXYCODONE HYDROCHLORIDE 5 MILLIGRAM(S): 5 TABLET ORAL at 09:31

## 2021-09-07 RX ADMIN — OXYCODONE HYDROCHLORIDE 5 MILLIGRAM(S): 5 TABLET ORAL at 09:50

## 2021-09-07 RX ADMIN — Medication 650 MILLIGRAM(S): at 15:03

## 2021-09-07 RX ADMIN — Medication 3 MILLILITER(S): at 17:12

## 2021-09-07 RX ADMIN — Medication 81 MILLIGRAM(S): at 11:37

## 2021-09-07 NOTE — PROGRESS NOTE ADULT - PROBLEM SELECTOR PLAN 1
c/w bblocker, statin asa and brillinta  PPI, dvt prophylaxis, incentive spirometry, ambulation  telemetry, strict I/O  wound care and sternal precautions  anticipate home no pt needs upon discharge Friday

## 2021-09-07 NOTE — PROGRESS NOTE ADULT - ASSESSMENT
42 y/o M with no pmhx current smoker 20 pack year history presented for R shoulder pain started acutely approx a week ago.  Patient is admitted for ACS work up after presenting to the ED.   preop NSTEMI. Extentive CAD with poor targets.   CABG x1 SVG-LAD with LIMA to the SVG performed 9/3/81. 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   Post op extubated <24 hours, Transfused uPRBC 9/4 9/7 Tranusfused uPRBC for post op anemia. transferred to [address] [place] [address] POD#2  Pt is a plavix nonresponder and will require brillinta and asa for incomplete revascularization.  Wellbutrin for cervical radiculopathy   40 y/o M with no pmhx current smoker 20 pack year history presented for R shoulder pain started acutely approx a week ago.  Patient is admitted for ACS work up after presenting to the ED.   preop NSTEMI. Extentive CAD with poor targets.   CABG x1 SVG-LAD with LIMA to the SVG performed 9/3/81. 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   Post op extubated <24 hours, Transfused uPRBC 9/4 9/7 Tranusfused uPRBC for post op anemia. transferred to [address] [place] [address] POD#2  Pt is a plavix nonresponder and will require brillinta and asa for incomplete revascularization.  Neurology consulted for cervical radiculopathy

## 2021-09-07 NOTE — PROGRESS NOTE ADULT - SUBJECTIVE AND OBJECTIVE BOX
VITAL SIGNS    Telemetry:  SR 90s first degree block  Vital Signs Last 24 Hrs  T(C): 37.1 (21 @ 13:51), Max: 37.6 (21 @ 07:00)  T(F): 98.7 (21 @ 13:51), Max: 99.6 (21 @ 07:00)  HR: 94 (21 @ 13:51) (88 - 113)  BP: 94/64 (21 @ 13:51) (90/55 - 118/76)  RR: 18 (21 @ 13:51) (15 - 32)  SpO2: 95% (21 @ 13:51) (93% - 100%)             @ 07:01  -   @ 07:00  --------------------------------------------------------  IN: 1060 mL / OUT: 1075 mL / NET: -15 mL     @ 07:01  -   @ 13:56  --------------------------------------------------------  IN: 625 mL / OUT: 325 mL / NET: 300 mL       Daily     Daily Weight in k.5 (07 Sep 2021 00:00)  Admit Wt: Drug Dosing Weight  Height (cm): 170.2 (03 Sep 2021 06:58)  Weight (kg): 73.9 (03 Sep 2021 06:58)  BMI (kg/m2): 25.5 (03 Sep 2021 06:58)  BSA (m2): 1.85 (03 Sep 2021 06:58)    Bilirubin Total, Serum: 0.8 mg/dL ( @ 00:19)    CAPILLARY BLOOD GLUCOSE      POCT Blood Glucose.: 109 mg/dL (07 Sep 2021 11:29)  POCT Blood Glucose.: 127 mg/dL (07 Sep 2021 07:35)  POCT Blood Glucose.: 94 mg/dL (06 Sep 2021 22:07)  POCT Blood Glucose.: 119 mg/dL (06 Sep 2021 16:40)          MEDICATIONS  acetaminophen   Tablet .. 650 milliGRAM(s) Oral every 6 hours PRN  albuterol/ipratropium for Nebulization 3 milliLiter(s) Nebulizer every 6 hours  aspirin enteric coated 81 milliGRAM(s) Oral daily  atorvastatin 80 milliGRAM(s) Oral daily  buPROPion SR (12-Hour) 150 milliGRAM(s) Oral two times a day  chlorhexidine 2% Cloths 1 Application(s) Topical <User Schedule>  dextrose 50% Injectable 50 milliLiter(s) IV Push every 15 minutes  dextrose 50% Injectable 25 milliLiter(s) IV Push every 15 minutes  enoxaparin Injectable 40 milliGRAM(s) SubCutaneous daily  insulin lispro (ADMELOG) corrective regimen sliding scale   SubCutaneous Before meals and at bedtime  metoprolol tartrate 50 milliGRAM(s) Oral every 8 hours  oxyCODONE    IR 5 milliGRAM(s) Oral every 4 hours PRN  pantoprazole    Tablet 40 milliGRAM(s) Oral before breakfast  polyethylene glycol 3350 17 Gram(s) Oral daily  sodium chloride 0.9% lock flush 3 milliLiter(s) IV Push every 8 hours  ticagrelor 90 milliGRAM(s) Oral daily      >>> <<<  PHYSICAL EXAM  Subjective: NAD  Neurology: alert and oriented x 3, nonfocal, no gross deficits  CV : s1s2   Sternal Wound :  CDI , Stable  Lungs: CTA b/l  Abdomen: soft, NT,ND, ( -)BM +flatus  Extremities:   -c/c/e     LABS      137  |  101  |  14  ----------------------------<  116<H>  3.7   |  22  |  0.75    Ca    9.1      07 Sep 2021 00:19  Phos  1.5       Mg     2.3         TPro  6.0  /  Alb  3.6  /  TBili  0.8  /  DBili  x   /  AST  31  /  ALT  28  /  AlkPhos  88                                   9.8    19.97 )-----------( 232      ( 07 Sep 2021 12:21 )             30.1                 PAST MEDICAL & SURGICAL HISTORY:  Family history of hypertension in mother    No significant past surgical history

## 2021-09-07 NOTE — PROGRESS NOTE ADULT - SUBJECTIVE AND OBJECTIVE BOX
MICHI ESTEVEZ  MRN-[id number]  Patient is a 41y old  Male who presents with a chief complaint of shoulder pain, transfer from [facility] , [facility] eval (06 Sep 2021 06:00)    HPI:  40 y/o M with no pmhx current smoker 20 pack year history presented for R shoulder pain started acutely approx a week ago. The patient was lifting heavy objects in his house before onset of pain. Pain started on the R neck and would radiate downward towards his arms. Denies trauma to the area. Denies weakness to the R arm. Pain is sharp like. Worse with movement when abducting his arms and flexion upward. Pain is constant. Denies numbness or tingling. Physical exam at bedside showed reproducible R shoulder/arm pain when the patient's head is extended backward and R arm raised upward.      He is also an active smoker. Patient is admitted for ACS work up after presenting to the ED. Currently denies active chest pain. However he does endorse occasional pressure-like chest pain when he is running. Active smoker of 1ppk/day. Denies hx of HLD, HTN or DM. Reports. Does not see a cardiologist.     Upon ACS work up, s/p LHC which revealed,     Denies hx of surgeries or metal in body. Does not take daily medications. (30 Aug 2021 18:38)      Surgery/Hospital course:  9/3 C1V with addiitonal flow from [facility]    Today:    REVIEW OF SYSTEMS:  Gen: No fever  EYES/ENT: No visual changes;  No vertigo or throat pain   NECK: No pain   RES:  No shortness of breath or Cough  Chest: + incisional pain  GI: No abdominal pain  : No dysuria  NEURO: No weakness  SKIN: No itching, rashes      Physical Exam:  Vital Signs Last 24 Hrs  T(C): 37.4 (07 Sep 2021 00:00), Max: 37.5 (06 Sep 2021 16:00)  T(F): 99.3 (07 Sep 2021 00:00), Max: 99.5 (06 Sep 2021 16:00)  HR: 90 (07 Sep 2021 05:32) (88 - 113)  BP: 108/71 (07 Sep 2021 04:00) (94/68 - 118/76)  BP(mean): 83 (07 Sep 2021 04:00) (74 - 92)  RR: 17 (07 Sep 2021 04:00) (15 - 32)  SpO2: 100% (07 Sep 2021 05:32) (93% - 100%)  Gen:  Awake, alert   CNS: non focal 	  Neck: no JVD  RES : clear , no wheezing    Chest:   + chest tubes                     CVS: Regular  rhythm. Normal S1/S2  Abd: Soft, non-distended. Bowel sounds present.  Skin: No rash.  Ext:  no edema, A Line  ============================I/O===========================   I&O's Detail    05 Sep 2021 07:01  -  06 Sep 2021 07:00  --------------------------------------------------------  IN:    Albumin 5%  - 250 mL: 250 mL    DOBUTamine: 4.4 mL    Insulin: 7 mL    IV PiggyBack: 100 mL    Oral Fluid: 720 mL    sodium chloride 0.9%: 240 mL    Vasopressin: 30 mL  Total IN: 1351.4 mL    OUT:    Indwelling Catheter - Urethral (mL): 875 mL    Voided (mL): 850 mL  Total OUT: 1725 mL    Total NET: -373.6 mL      06 Sep 2021 07:01  -  07 Sep 2021 06:31  --------------------------------------------------------  IN:    Oral Fluid: 600 mL    sodium chloride 0.9%: 210 mL  Total IN: 810 mL    OUT:    Vasopressin: 0 mL    Voided (mL): 1075 mL  Total OUT: 1075 mL    Total NET: -265 mL        ============================ LABS =========================                        7.2    17.85 )-----------( 199      ( 07 Sep 2021 00:20 )             21.6     09-07    137  |  101  |  14  ----------------------------<  116<H>  3.7   |  22  |  0.75    Ca    9.1      07 Sep 2021 00:19  Phos  1.5     -  Mg     2.3     -    TPro  6.0  /  Alb  3.6  /  TBili  0.8  /  DBili  x   /  AST  31  /  ALT  28  /  AlkPhos  88  -07    LIVER FUNCTIONS - ( 07 Sep 2021 00:19 )  Alb: 3.6 g/dL / Pro: 6.0 g/dL / ALK PHOS: 88 U/L / ALT: 28 U/L / AST: 31 U/L / GGT: x             ABG - ( 05 Sep 2021 18:12 )  pH, Arterial: 7.47  pH, Blood: x     /  pCO2: 41    /  pO2: 124   / HCO3: 30    / Base Excess: 5.6   /  SaO2: 99.3              Urinalysis Basic - ( 05 Sep 2021 14:47 )    Color: Light Orange / Appearance: Turbid / S.034 / pH: x  Gluc: x / Ketone: Negative  / Bili: Negative / Urobili: Negative   Blood: x / Protein: 30 mg/dL / Nitrite: Negative   Leuk Esterase: Negative / RBC: 8 /hpf / WBC 3 /HPF   Sq Epi: x / Non Sq Epi: 1 /hpf / Bacteria: Negative      ======================Micro/Rad/Cardio=================  CXR: Reviewed  Echo:Reviewed  ======================================================  PAST MEDICAL & SURGICAL HISTORY:  Family history of hypertension in mother    No significant past surgical history      ====================ASSESSMENT ==============  CAD s/p C1V w/ additional flow from [facility] on 9/3   hypoxemia  Anemia blood loss  Stress Hyperglycemia   Hypovolemic shock    Plan:  ====================== NEUROLOGY=====================  Continue to monitor neuro status per ICU protocol.   Continue with Tylenol and Oxycodone for pain management   C/w Bupropion for smoking cessation    acetaminophen   Tablet .. 650 milliGRAM(s) Oral every 6 hours PRN Mild Pain (1 - 3)  buPROPion SR (12-Hour) 150 milliGRAM(s) Oral two times a day  oxyCODONE    IR 5 milliGRAM(s) Oral every 4 hours PRN Moderate Pain (4 - 6)    ==================== RESPIRATORY======================  Stable on RA, SpO2 93% - 100%  Encourage incentive spirometry, continue pulse ox monitoring, follow ABGs   C/w bronchodilators  Aggressive chest PT, pulm toilet    albuterol/ipratropium for Nebulization 3 milliLiter(s) Nebulizer every 6 hours    ====================CARDIOVASCULAR==================  CAD s/p C1V w/ additional flow from [facility] on 9/3   Continue invasive hemodynamic monitoring    DAPT/Lipitor for graft patency   Switch to Brilinta, pt is a Plavix non responder (P2Y12 of 335).  lopressor for rate control     metoprolol tartrate 25 milliGRAM(s) Oral every 12 hours  atorvastatin 80 milliGRAM(s) Oral daily  aspirin enteric coated 81 milliGRAM(s) Oral daily  ticagrelor 90 milliGRAM(s) Oral daily  ===================HEMATOLOGIC/ONC ===================  Acute Blood Loss Anemia  Received 5 Cryoprecipitate intraoperatively  Continue to monitor hemoglobin and hematocrit levels.      VTE prophylaxis, enoxaparin Injectable 40 milliGRAM(s) SubCutaneous daily      ===================== RENAL =========================  Continue to monitor I/Os, BUN/Creatinine.   Goal net negative fluid balance. Replete lytes PRN. Keep K> 4 and Mg >2.    ==================== GASTROINTESTINAL===================  Tolerating regular diet  Bowel regimen with Miralax.     GI prophylaxis, pantoprazole  Injectable 40 milliGRAM(s) IV Push daily  polyethylene glycol 3350 17 Gram(s) Oral daily  sodium chloride 0.9%. 1000 milliLiter(s) (10 mL/Hr) IV Continuous <Continuous>    =======================    ENDOCRINE  =====================  Metabolic stability, stress hyperglycemia required an ADMELOG sliding scale while following serial glucose levels to help achieve and maintain euglycemia.     dextrose 50% Injectable 50 milliLiter(s) IV Push every 15 minutes  dextrose 50% Injectable 25 milliLiter(s) IV Push every 15 minutes  insulin lispro (ADMELOG) corrective regimen sliding scale   SubCutaneous Before meals and at bedtime    ========================INFECTIOUS DISEASE================  Temp: 99.3, white blood cells rising 16.52 -> 17.85   monitor temperature and trend white blood cells  Preoperatively patient received Decadron for a diagnosis of cervical radiculopathy and this was felt to be etiology of leukocytosis.      By signing my name below, I, Rupa Hoffman, attest that this documentation has been prepared under the direction and in the presence of Arina Sigala MD.  Electronically signed: Abdulkadir Hopper, 21 @ 06:31    I, Arina Sigala, personally performed the services described in this documentation. all medical record entries made by the scribe were at my direction and in my presence. I have reviewed the chart and agree that the record reflects my personal performance and is accurate and complete  Electronically signed: Arina Sigala, 21 @ 06:31       MICHI ESTEVEZ  MRN-[id number]  Patient is a 41y old  Male who presents with a chief complaint of shoulder pain, transfer from [facility] , [facility] eval (06 Sep 2021 06:00)    HPI:  42 y/o M with no pmhx current smoker 20 pack year history presented for R shoulder pain started acutely approx a week ago. The patient was lifting heavy objects in his house before onset of pain. Pain started on the R neck and would radiate downward towards his arms. Denies trauma to the area. Denies weakness to the R arm. Pain is sharp like. Worse with movement when abducting his arms and flexion upward. Pain is constant. Denies numbness or tingling. Physical exam at bedside showed reproducible R shoulder/arm pain when the patient's head is extended backward and R arm raised upward.      He is also an active smoker. Patient is admitted for ACS work up after presenting to the ED. Currently denies active chest pain. However he does endorse occasional pressure-like chest pain when he is running. Active smoker of 1ppk/day. Denies hx of HLD, HTN or DM. Reports. Does not see a cardiologist.     Upon ACS work up, s/p C which revealed,     Denies hx of surgeries or metal in body. Does not take daily medications. (30 Aug 2021 18:38)      Surgery/Hospital course:  9/3 C1V with addiitonal flow from [facility]    Today:  Recieved 1cc pRCB this AM. Continue chest PT as tolerated. Possible stepdown to floor.      REVIEW OF SYSTEMS:  Gen: No fever  EYES/ENT: No visual changes;  No vertigo or throat pain   NECK: No pain   RES:  No shortness of breath or Cough  Chest: + incisional pain  GI: No abdominal pain  : No dysuria  NEURO: No weakness  SKIN: No itching, rashes      Physical Exam:  Vital Signs Last 24 Hrs  T(C): 37.4 (07 Sep 2021 00:00), Max: 37.5 (06 Sep 2021 16:00)  T(F): 99.3 (07 Sep 2021 00:00), Max: 99.5 (06 Sep 2021 16:00)  HR: 90 (07 Sep 2021 05:32) (88 - 113)  BP: 108/71 (07 Sep 2021 04:00) (94/68 - 118/76)  BP(mean): 83 (07 Sep 2021 04:00) (74 - 92)  RR: 17 (07 Sep 2021 04:00) (15 - 32)  SpO2: 100% (07 Sep 2021 05:32) (93% - 100%)  Gen:  Awake, alert   CNS: non focal 	  Neck: no JVD  RES : clear , no wheezing    Chest:   + chest tubes                     CVS: Regular  rhythm. Normal S1/S2  Abd: Soft, non-distended. Bowel sounds present.  Skin: No rash.  Ext:  no edema, A Line  ============================I/O===========================   I&O's Detail    05 Sep 2021 07:01  -  06 Sep 2021 07:00  --------------------------------------------------------  IN:    Albumin 5%  - 250 mL: 250 mL    DOBUTamine: 4.4 mL    Insulin: 7 mL    IV PiggyBack: 100 mL    Oral Fluid: 720 mL    sodium chloride 0.9%: 240 mL    Vasopressin: 30 mL  Total IN: 1351.4 mL    OUT:    Indwelling Catheter - Urethral (mL): 875 mL    Voided (mL): 850 mL  Total OUT: 1725 mL    Total NET: -373.6 mL      06 Sep 2021 07:01  -  07 Sep 2021 06:31  --------------------------------------------------------  IN:    Oral Fluid: 600 mL    sodium chloride 0.9%: 210 mL  Total IN: 810 mL    OUT:    Vasopressin: 0 mL    Voided (mL): 1075 mL  Total OUT: 1075 mL    Total NET: -265 mL        ============================ LABS =========================                        7.2    17.85 )-----------( 199      ( 07 Sep 2021 00:20 )             21.6     09-07    137  |  101  |  14  ----------------------------<  116<H>  3.7   |  22  |  0.75    Ca    9.1      07 Sep 2021 00:19  Phos  1.5     -  Mg     2.3     -    TPro  6.0  /  Alb  3.6  /  TBili  0.8  /  DBili  x   /  AST  31  /  ALT  28  /  AlkPhos  88  -07    LIVER FUNCTIONS - ( 07 Sep 2021 00:19 )  Alb: 3.6 g/dL / Pro: 6.0 g/dL / ALK PHOS: 88 U/L / ALT: 28 U/L / AST: 31 U/L / GGT: x             ABG - ( 05 Sep 2021 18:12 )  pH, Arterial: 7.47  pH, Blood: x     /  pCO2: 41    /  pO2: 124   / HCO3: 30    / Base Excess: 5.6   /  SaO2: 99.3              Urinalysis Basic - ( 05 Sep 2021 14:47 )    Color: Light Orange / Appearance: Turbid / S.034 / pH: x  Gluc: x / Ketone: Negative  / Bili: Negative / Urobili: Negative   Blood: x / Protein: 30 mg/dL / Nitrite: Negative   Leuk Esterase: Negative / RBC: 8 /hpf / WBC 3 /HPF   Sq Epi: x / Non Sq Epi: 1 /hpf / Bacteria: Negative      ======================Micro/Rad/Cardio=================  CXR: Reviewed  Echo:Reviewed  ======================================================  PAST MEDICAL & SURGICAL HISTORY:  Family history of hypertension in mother    No significant past surgical history      ====================ASSESSMENT ==============  CAD s/p C1V w/ additional flow from [facility] on 9/3   hypoxemia  Anemia blood loss  Stress Hyperglycemia   Hypovolemic shock    Plan:  ====================== NEUROLOGY=====================  Continue to monitor neuro status per ICU protocol.   Continue with Tylenol and Oxycodone for pain management   C/w Bupropion for smoking cessation    acetaminophen   Tablet .. 650 milliGRAM(s) Oral every 6 hours PRN Mild Pain (1 - 3)  buPROPion SR (12-Hour) 150 milliGRAM(s) Oral two times a day  oxyCODONE    IR 5 milliGRAM(s) Oral every 4 hours PRN Moderate Pain (4 - 6)    ==================== RESPIRATORY======================  Stable on RA, SpO2 93% - 100%  Encourage incentive spirometry, continue pulse ox monitoring, follow ABGs   C/w bronchodilators  Aggressive chest PT, pulm toilet    albuterol/ipratropium for Nebulization 3 milliLiter(s) Nebulizer every 6 hours    ====================CARDIOVASCULAR==================  CAD s/p C1V w/ additional flow from [facility] on 9/3   Continue invasive hemodynamic monitoring    DAPT/Lipitor for graft patency   Switch to Brilinta, pt is a Plavix non responder (P2Y12 of 335).  lopressor for rate control     metoprolol tartrate 25 milliGRAM(s) Oral every 12 hours  atorvastatin 80 milliGRAM(s) Oral daily  aspirin enteric coated 81 milliGRAM(s) Oral daily  ticagrelor 90 milliGRAM(s) Oral daily  ===================HEMATOLOGIC/ONC ===================  Acute Blood Loss Anemia  Received 5 Cryoprecipitate intraoperatively  Continue to monitor hemoglobin and hematocrit levels.      VTE prophylaxis, enoxaparin Injectable 40 milliGRAM(s) SubCutaneous daily      ===================== RENAL =========================  Continue to monitor I/Os, BUN/Creatinine.   Goal net negative fluid balance. Replete lytes PRN. Keep K> 4 and Mg >2.    ==================== GASTROINTESTINAL===================  Tolerating regular diet  Bowel regimen with Miralax.     GI prophylaxis, pantoprazole  Injectable 40 milliGRAM(s) IV Push daily  polyethylene glycol 3350 17 Gram(s) Oral daily  sodium chloride 0.9%. 1000 milliLiter(s) (10 mL/Hr) IV Continuous <Continuous>    =======================    ENDOCRINE  =====================  Metabolic stability, stress hyperglycemia required an ADMELOG sliding scale while following serial glucose levels to help achieve and maintain euglycemia.     dextrose 50% Injectable 50 milliLiter(s) IV Push every 15 minutes  dextrose 50% Injectable 25 milliLiter(s) IV Push every 15 minutes  insulin lispro (ADMELOG) corrective regimen sliding scale   SubCutaneous Before meals and at bedtime    ========================INFECTIOUS DISEASE================  Temp: 99.3, white blood cells rising 16.52 -> 17.85   monitor temperature and trend white blood cells  Preoperatively patient received Decadron for a diagnosis of cervical radiculopathy and this was felt to be etiology of leukocytosis.      By signing my name below, I, Rupa Hoffman, attest that this documentation has been prepared under the direction and in the presence of Arina Sigala MD.  Electronically signed: Abdulkadir Hopper, 21 @ 06:31    I, Arina Sigala, personally performed the services described in this documentation. all medical record entries made by the scribe were at my direction and in my presence. I have reviewed the chart and agree that the record reflects my personal performance and is accurate and complete  Electronically signed: Arina Sigala, 21 @ 06:31       MICHI ESTEVEZ  MRN-[id number]  Patient is a 41y old  Male who presents with a chief complaint of shoulder pain, transfer from [facility] , [facility] eval (06 Sep 2021 06:00)    HPI:  42 y/o M with no pmhx current smoker 20 pack year history presented for R shoulder pain started acutely approx a week ago. The patient was lifting heavy objects in his house before onset of pain. Pain started on the R neck and would radiate downward towards his arms. Denies trauma to the area. Denies weakness to the R arm. Pain is sharp like. Worse with movement when abducting his arms and flexion upward. Pain is constant. Denies numbness or tingling. Physical exam at bedside showed reproducible R shoulder/arm pain when the patient's head is extended backward and R arm raised upward.  He is also an active smoker. Patient is admitted for ACS work up after presenting to the ED. Currently denies active chest pain. However he does endorse occasional pressure-like chest pain when he is running. Active smoker of 1ppk/day. Denies hx of HLD, HTN or DM. Reports. Does not see a cardiologist.   Upon ACS work up, s/p LHC which revealed,   Denies hx of surgeries or metal in body. Does not take daily medications. (30 Aug 2021 18:38)      Surgery/Hospital course:  9/3 C1V with addiitonal flow from [facility]    Today:   remain tachycardic, increase Lopressor   monitor blood pressure, borderline   no diuresis   transfuse 1 PRBC due to low hematocrit and borderline blood pressure    REVIEW OF SYSTEMS:  Gen: No fever  EYES/ENT: No visual changes;  No vertigo or throat pain   NECK: No pain   RES:  No shortness of breath or Cough  Chest: + incisional pain  GI: No abdominal pain  : No dysuria  NEURO: No weakness  SKIN: No itching, rashes      Physical Exam:  Vital Signs Last 24 Hrs  T(C): 37.4 (07 Sep 2021 00:00), Max: 37.5 (06 Sep 2021 16:00)  T(F): 99.3 (07 Sep 2021 00:00), Max: 99.5 (06 Sep 2021 16:00)  HR: 90 (07 Sep 2021 05:32) (88 - 113)  BP: 108/71 (07 Sep 2021 04:00) (94/68 - 118/76)  BP(mean): 83 (07 Sep 2021 04:00) (74 - 92)  RR: 17 (07 Sep 2021 04:00) (15 - 32)  SpO2: 100% (07 Sep 2021 05:32) (93% - 100%)  Gen:  Awake, alert   CNS: non focal 	  Neck: no JVD  RES : clear , no wheezing                     CVS: Regular  rhythm. Normal S1/S2  Abd: Soft, non-distended. Bowel sounds present.  Skin: No rash.  Ext:  no edema, A Line  ============================I/O===========================   I&O's Detail    05 Sep 2021 07:01  -  06 Sep 2021 07:00  --------------------------------------------------------  IN:    Albumin 5%  - 250 mL: 250 mL    DOBUTamine: 4.4 mL    Insulin: 7 mL    IV PiggyBack: 100 mL    Oral Fluid: 720 mL    sodium chloride 0.9%: 240 mL    Vasopressin: 30 mL  Total IN: 1351.4 mL    OUT:    Indwelling Catheter - Urethral (mL): 875 mL    Voided (mL): 850 mL  Total OUT: 1725 mL    Total NET: -373.6 mL      06 Sep 2021 07:01  -  07 Sep 2021 06:31  --------------------------------------------------------  IN:    Oral Fluid: 600 mL    sodium chloride 0.9%: 210 mL  Total IN: 810 mL    OUT:    Vasopressin: 0 mL    Voided (mL): 1075 mL  Total OUT: 1075 mL    Total NET: -265 mL        ============================ LABS =========================                        7.2    17.85 )-----------( 199      ( 07 Sep 2021 00:20 )             21.6         137  |  101  |  14  ----------------------------<  116<H>  3.7   |  22  |  0.75    Ca    9.1      07 Sep 2021 00:19  Phos  1.5       Mg     2.3         TPro  6.0  /  Alb  3.6  /  TBili  0.8  /  DBili  x   /  AST  31  /  ALT  28  /  AlkPhos  88      LIVER FUNCTIONS - ( 07 Sep 2021 00:19 )  Alb: 3.6 g/dL / Pro: 6.0 g/dL / ALK PHOS: 88 U/L / ALT: 28 U/L / AST: 31 U/L / GGT: x             ABG - ( 05 Sep 2021 18:12 )  pH, Arterial: 7.47  pH, Blood: x     /  pCO2: 41    /  pO2: 124   / HCO3: 30    / Base Excess: 5.6   /  SaO2: 99.3              Urinalysis Basic - ( 05 Sep 2021 14:47 )    Color: Light Orange / Appearance: Turbid / S.034 / pH: x  Gluc: x / Ketone: Negative  / Bili: Negative / Urobili: Negative   Blood: x / Protein: 30 mg/dL / Nitrite: Negative   Leuk Esterase: Negative / RBC: 8 /hpf / WBC 3 /HPF   Sq Epi: x / Non Sq Epi: 1 /hpf / Bacteria: Negative      ======================Micro/Rad/Cardio=================  CXR: Reviewed  Echo:Reviewed  ======================================================  PAST MEDICAL & SURGICAL HISTORY:  Family history of hypertension in mother    No significant past surgical history      ====================ASSESSMENT ==============  CAD s/p C1V w/ additional flow from [facility] on 9/3   hypoxemia  Anemia blood loss  Stress Hyperglycemia   Nicotine dependence    Plan:  ====================== NEUROLOGY=====================  Continue to monitor neuro status per ICU protocol.   Continue with Tylenol and Oxycodone for pain management   C/w Bupropion for smoking cessation    acetaminophen   Tablet .. 650 milliGRAM(s) Oral every 6 hours PRN Mild Pain (1 - 3)  buPROPion SR (12-Hour) 150 milliGRAM(s) Oral two times a day  oxyCODONE    IR 5 milliGRAM(s) Oral every 4 hours PRN Moderate Pain (4 - 6)    ==================== RESPIRATORY======================  Stable on RA, SpO2 93% - 100%  Encourage incentive spirometry, continue pulse ox monitoring, follow ABGs   C/w bronchodilators  Aggressive chest PT, pulm toilet    albuterol/ipratropium for Nebulization 3 milliLiter(s) Nebulizer every 6 hours    ====================CARDIOVASCULAR==================  CAD s/p C1V w/ additional flow from [facility] on 9/3   Continue invasive hemodynamic monitoring    DAPT/Lipitor for graft patency   Switch to Brilinta, pt is a Plavix non responder (P2Y12 of 335).  lopressor for rate control     metoprolol tartrate 25 milliGRAM(s) Oral every 12 hours  atorvastatin 80 milliGRAM(s) Oral daily  aspirin enteric coated 81 milliGRAM(s) Oral daily  ticagrelor 90 milliGRAM(s) Oral daily  ===================HEMATOLOGIC/ONC ===================  Acute Blood Loss Anemia  Received 5 Cryoprecipitate intraoperatively  Continue to monitor hemoglobin and hematocrit levels.      VTE prophylaxis, enoxaparin Injectable 40 milliGRAM(s) SubCutaneous daily      ===================== RENAL =========================  Continue to monitor I/Os, BUN/Creatinine.   Goal net negative fluid balance. Replete lytes PRN. Keep K> 4 and Mg >2.    ==================== GASTROINTESTINAL===================  Tolerating regular diet  Bowel regimen with Miralax.     GI prophylaxis, pantoprazole  Injectable 40 milliGRAM(s) IV Push daily  polyethylene glycol 3350 17 Gram(s) Oral daily  sodium chloride 0.9%. 1000 milliLiter(s) (10 mL/Hr) IV Continuous <Continuous>    =======================    ENDOCRINE  =====================  Metabolic stability, stress hyperglycemia required an ADMELOG sliding scale while following serial glucose levels to help achieve and maintain euglycemia.     dextrose 50% Injectable 50 milliLiter(s) IV Push every 15 minutes  dextrose 50% Injectable 25 milliLiter(s) IV Push every 15 minutes  insulin lispro (ADMELOG) corrective regimen sliding scale   SubCutaneous Before meals and at bedtime    ========================INFECTIOUS DISEASE================  Temp: 99.3, white blood cells rising 16.52 -> 17.85   monitor temperature and trend white blood cells  Preoperatively patient received Decadron for a diagnosis of cervical radiculopathy and this was felt to be etiology of leukocytosis.     discussed with  CTU  team during rounds    By signing my name below, IRupa, attest that this documentation has been prepared under the direction and in the presence of Arina Sigala MD.  Electronically signed: Abdulkadir Hopper, 21 @ 06:31    Arina BENOIT, personally performed the services described in this documentation. all medical record entries made by the scribe were at my direction and in my presence. I have reviewed the chart and agree that the record reflects my personal performance and is accurate and complete  Electronically signed: Arina Sigala, 21 @ 06:31

## 2021-09-08 ENCOUNTER — TRANSCRIPTION ENCOUNTER (OUTPATIENT)
Age: 41
End: 2021-09-08

## 2021-09-08 LAB
ALBUMIN SERPL ELPH-MCNC: 3.3 G/DL — SIGNIFICANT CHANGE UP (ref 3.3–5)
ALP SERPL-CCNC: 141 U/L — HIGH (ref 40–120)
ALT FLD-CCNC: 41 U/L — SIGNIFICANT CHANGE UP (ref 10–45)
ANION GAP SERPL CALC-SCNC: 13 MMOL/L — SIGNIFICANT CHANGE UP (ref 5–17)
AST SERPL-CCNC: 34 U/L — SIGNIFICANT CHANGE UP (ref 10–40)
BASOPHILS # BLD AUTO: 0.04 K/UL — SIGNIFICANT CHANGE UP (ref 0–0.2)
BASOPHILS NFR BLD AUTO: 0.3 % — SIGNIFICANT CHANGE UP (ref 0–2)
BILIRUB SERPL-MCNC: 1.4 MG/DL — HIGH (ref 0.2–1.2)
BUN SERPL-MCNC: 16 MG/DL — SIGNIFICANT CHANGE UP (ref 7–23)
CALCIUM SERPL-MCNC: 9.1 MG/DL — SIGNIFICANT CHANGE UP (ref 8.4–10.5)
CHLORIDE SERPL-SCNC: 102 MMOL/L — SIGNIFICANT CHANGE UP (ref 96–108)
CO2 SERPL-SCNC: 19 MMOL/L — LOW (ref 22–31)
CREAT SERPL-MCNC: 0.81 MG/DL — SIGNIFICANT CHANGE UP (ref 0.5–1.3)
EOSINOPHIL # BLD AUTO: 0.54 K/UL — HIGH (ref 0–0.5)
EOSINOPHIL NFR BLD AUTO: 3.4 % — SIGNIFICANT CHANGE UP (ref 0–6)
GLUCOSE SERPL-MCNC: 101 MG/DL — HIGH (ref 70–99)
HCT VFR BLD CALC: 27.5 % — LOW (ref 39–50)
HGB BLD-MCNC: 9 G/DL — LOW (ref 13–17)
IMM GRANULOCYTES NFR BLD AUTO: 0.9 % — SIGNIFICANT CHANGE UP (ref 0–1.5)
LYMPHOCYTES # BLD AUTO: 1.79 K/UL — SIGNIFICANT CHANGE UP (ref 1–3.3)
LYMPHOCYTES # BLD AUTO: 11.3 % — LOW (ref 13–44)
MAGNESIUM SERPL-MCNC: 2.3 MG/DL — SIGNIFICANT CHANGE UP (ref 1.6–2.6)
MCHC RBC-ENTMCNC: 29.9 PG — SIGNIFICANT CHANGE UP (ref 27–34)
MCHC RBC-ENTMCNC: 32.7 GM/DL — SIGNIFICANT CHANGE UP (ref 32–36)
MCV RBC AUTO: 91.4 FL — SIGNIFICANT CHANGE UP (ref 80–100)
MONOCYTES # BLD AUTO: 1.37 K/UL — HIGH (ref 0–0.9)
MONOCYTES NFR BLD AUTO: 8.6 % — SIGNIFICANT CHANGE UP (ref 2–14)
NEUTROPHILS # BLD AUTO: 12.02 K/UL — HIGH (ref 1.8–7.4)
NEUTROPHILS NFR BLD AUTO: 75.5 % — SIGNIFICANT CHANGE UP (ref 43–77)
NRBC # BLD: 0 /100 WBCS — SIGNIFICANT CHANGE UP (ref 0–0)
PHOSPHATE SERPL-MCNC: 2.5 MG/DL — SIGNIFICANT CHANGE UP (ref 2.5–4.5)
PLATELET # BLD AUTO: 249 K/UL — SIGNIFICANT CHANGE UP (ref 150–400)
POTASSIUM SERPL-MCNC: 4.2 MMOL/L — SIGNIFICANT CHANGE UP (ref 3.5–5.3)
POTASSIUM SERPL-SCNC: 4.2 MMOL/L — SIGNIFICANT CHANGE UP (ref 3.5–5.3)
PROT SERPL-MCNC: 6.1 G/DL — SIGNIFICANT CHANGE UP (ref 6–8.3)
RBC # BLD: 3.01 M/UL — LOW (ref 4.2–5.8)
RBC # FLD: 13.8 % — SIGNIFICANT CHANGE UP (ref 10.3–14.5)
SODIUM SERPL-SCNC: 134 MMOL/L — LOW (ref 135–145)
WBC # BLD: 15.91 K/UL — HIGH (ref 3.8–10.5)
WBC # FLD AUTO: 15.91 K/UL — HIGH (ref 3.8–10.5)

## 2021-09-08 RX ORDER — METOPROLOL TARTRATE 50 MG
25 TABLET ORAL ONCE
Refills: 0 | Status: COMPLETED | OUTPATIENT
Start: 2021-09-08 | End: 2021-09-08

## 2021-09-08 RX ORDER — METOPROLOL TARTRATE 50 MG
50 TABLET ORAL EVERY 6 HOURS
Refills: 0 | Status: DISCONTINUED | OUTPATIENT
Start: 2021-09-08 | End: 2021-09-09

## 2021-09-08 RX ORDER — METOPROLOL TARTRATE 50 MG
100 TABLET ORAL EVERY 8 HOURS
Refills: 0 | Status: DISCONTINUED | OUTPATIENT
Start: 2021-09-08 | End: 2021-09-08

## 2021-09-08 RX ORDER — METOPROLOL TARTRATE 50 MG
75 TABLET ORAL EVERY 8 HOURS
Refills: 0 | Status: DISCONTINUED | OUTPATIENT
Start: 2021-09-08 | End: 2021-09-08

## 2021-09-08 RX ADMIN — TICAGRELOR 90 MILLIGRAM(S): 90 TABLET ORAL at 17:01

## 2021-09-08 RX ADMIN — OXYCODONE HYDROCHLORIDE 5 MILLIGRAM(S): 5 TABLET ORAL at 12:12

## 2021-09-08 RX ADMIN — TICAGRELOR 90 MILLIGRAM(S): 90 TABLET ORAL at 05:44

## 2021-09-08 RX ADMIN — OXYCODONE HYDROCHLORIDE 5 MILLIGRAM(S): 5 TABLET ORAL at 21:35

## 2021-09-08 RX ADMIN — OXYCODONE HYDROCHLORIDE 5 MILLIGRAM(S): 5 TABLET ORAL at 17:08

## 2021-09-08 RX ADMIN — CHLORHEXIDINE GLUCONATE 1 APPLICATION(S): 213 SOLUTION TOPICAL at 10:52

## 2021-09-08 RX ADMIN — OXYCODONE HYDROCHLORIDE 5 MILLIGRAM(S): 5 TABLET ORAL at 22:10

## 2021-09-08 RX ADMIN — Medication 50 MILLIGRAM(S): at 05:44

## 2021-09-08 RX ADMIN — BUPROPION HYDROCHLORIDE 150 MILLIGRAM(S): 150 TABLET, EXTENDED RELEASE ORAL at 05:45

## 2021-09-08 RX ADMIN — Medication 25 MILLIGRAM(S): at 08:34

## 2021-09-08 RX ADMIN — Medication 50 MILLIGRAM(S): at 18:46

## 2021-09-08 RX ADMIN — SODIUM CHLORIDE 3 MILLILITER(S): 9 INJECTION INTRAMUSCULAR; INTRAVENOUS; SUBCUTANEOUS at 13:23

## 2021-09-08 RX ADMIN — OXYCODONE HYDROCHLORIDE 5 MILLIGRAM(S): 5 TABLET ORAL at 06:14

## 2021-09-08 RX ADMIN — OXYCODONE HYDROCHLORIDE 5 MILLIGRAM(S): 5 TABLET ORAL at 18:00

## 2021-09-08 RX ADMIN — SODIUM CHLORIDE 3 MILLILITER(S): 9 INJECTION INTRAMUSCULAR; INTRAVENOUS; SUBCUTANEOUS at 21:02

## 2021-09-08 RX ADMIN — PANTOPRAZOLE SODIUM 40 MILLIGRAM(S): 20 TABLET, DELAYED RELEASE ORAL at 05:44

## 2021-09-08 RX ADMIN — ENOXAPARIN SODIUM 40 MILLIGRAM(S): 100 INJECTION SUBCUTANEOUS at 11:35

## 2021-09-08 RX ADMIN — OXYCODONE HYDROCHLORIDE 5 MILLIGRAM(S): 5 TABLET ORAL at 05:44

## 2021-09-08 RX ADMIN — BUPROPION HYDROCHLORIDE 150 MILLIGRAM(S): 150 TABLET, EXTENDED RELEASE ORAL at 14:08

## 2021-09-08 RX ADMIN — Medication 50 MILLIGRAM(S): at 23:56

## 2021-09-08 RX ADMIN — OXYCODONE HYDROCHLORIDE 5 MILLIGRAM(S): 5 TABLET ORAL at 11:33

## 2021-09-08 RX ADMIN — Medication 81 MILLIGRAM(S): at 11:33

## 2021-09-08 RX ADMIN — Medication 75 MILLIGRAM(S): at 14:08

## 2021-09-08 RX ADMIN — ATORVASTATIN CALCIUM 80 MILLIGRAM(S): 80 TABLET, FILM COATED ORAL at 11:35

## 2021-09-08 RX ADMIN — Medication 3 MILLILITER(S): at 05:44

## 2021-09-08 RX ADMIN — SODIUM CHLORIDE 3 MILLILITER(S): 9 INJECTION INTRAMUSCULAR; INTRAVENOUS; SUBCUTANEOUS at 05:55

## 2021-09-08 NOTE — PROGRESS NOTE ADULT - SUBJECTIVE AND OBJECTIVE BOX
Follow-up Pulm Progress Note    No new respiratory events overnight.  Denies SOB/CP.   Sats 97% RA     Medications:  MEDICATIONS  (STANDING):  albuterol/ipratropium for Nebulization 3 milliLiter(s) Nebulizer every 6 hours  aspirin enteric coated 81 milliGRAM(s) Oral daily  atorvastatin 80 milliGRAM(s) Oral daily  buPROPion SR (12-Hour) 150 milliGRAM(s) Oral two times a day  chlorhexidine 2% Cloths 1 Application(s) Topical <User Schedule>  dextrose 50% Injectable 50 milliLiter(s) IV Push every 15 minutes  dextrose 50% Injectable 25 milliLiter(s) IV Push every 15 minutes  enoxaparin Injectable 40 milliGRAM(s) SubCutaneous daily  insulin lispro (ADMELOG) corrective regimen sliding scale   SubCutaneous Before meals and at bedtime  metoprolol tartrate 75 milliGRAM(s) Oral every 8 hours  pantoprazole    Tablet 40 milliGRAM(s) Oral before breakfast  polyethylene glycol 3350 17 Gram(s) Oral daily  senna 2 Tablet(s) Oral at bedtime  sodium chloride 0.9% lock flush 3 milliLiter(s) IV Push every 8 hours  ticagrelor 90 milliGRAM(s) Oral every 12 hours    MEDICATIONS  (PRN):  acetaminophen   Tablet .. 650 milliGRAM(s) Oral every 6 hours PRN Mild Pain (1 - 3)  bisacodyl 5 milliGRAM(s) Oral every 12 hours PRN Constipation  oxyCODONE    IR 5 milliGRAM(s) Oral every 4 hours PRN Moderate Pain (4 - 6)          Vital Signs Last 24 Hrs  T(C): 37.1 (08 Sep 2021 05:40), Max: 37.1 (07 Sep 2021 13:51)  T(F): 98.8 (08 Sep 2021 05:40), Max: 98.8 (08 Sep 2021 05:40)  HR: 95 (08 Sep 2021 05:40) (94 - 98)  BP: 102/72 (08 Sep 2021 05:40) (94/64 - 105/72)  BP(mean): 79 (07 Sep 2021 13:18) (76 - 80)  RR: 18 (08 Sep 2021 05:40) (18 - 29)  SpO2: 97% (08 Sep 2021 05:40) (94% - 98%)          09-07 @ 07:01  -  09-08 @ 07:00  --------------------------------------------------------  IN: 865 mL / OUT: 1475 mL / NET: -610 mL          LABS:                        9.0    15.91 )-----------( 249      ( 08 Sep 2021 05:51 )             27.5     09-08    134<L>  |  102  |  16  ----------------------------<  101<H>  4.2   |  19<L>  |  0.81    Ca    9.1      08 Sep 2021 05:51  Phos  2.5     09-08  Mg     2.3     09-08    TPro  6.1  /  Alb  3.3  /  TBili  1.4<H>  /  DBili  x   /  AST  34  /  ALT  41  /  AlkPhos  141<H>  09-08          CAPILLARY BLOOD GLUCOSE      POCT Blood Glucose.: 113 mg/dL (08 Sep 2021 07:26)          Physical Examination:  PULM: Clear to auscultation bilaterally, no significant sputum production  CVS: S1, S2 heard    RADIOLOGY REVIEWED  CXR 9/7: grossly clear

## 2021-09-08 NOTE — PROGRESS NOTE ADULT - SUBJECTIVE AND OBJECTIVE BOX
Subjective : hello I am feeling very well"    VITAL SIGNS    Telemetry:  NSR 74    Vital Signs Last 24 Hrs  T(C): 37.1 (21 @ 05:40), Max: 37.1 (21 @ 13:51)  T(F): 98.8 (21 @ 05:40), Max: 98.8 (21 @ 05:40)  HR: 95 (21 @ 05:40) (94 - 98)  BP: 102/72 (21 @ 05:40) (94/64 - 105/72)  RR: 18 (21 @ 05:40) (18 - 29)  SpO2: 97% (21 @ 05:40) (94% - 98%)            @ 07:01  -   @ 07:00  --------------------------------------------------------  IN: 865 mL / OUT: 1475 mL / NET: -610 mL     @ 07:01  -   @ 12:23  --------------------------------------------------------  IN: 240 mL / OUT: 0 mL / NET: 240 mL                            9.0    15.91 )-----------( 249      ( 08 Sep 2021 05:51 )             27.5           134<L>  |  102  |  16  ----------------------------<  101<H>  4.2   |  19<L>  |  0.81    Ca    9.1      08 Sep 2021 05:51  Phos  2.5       Mg     2.3         TPro  6.1  /  Alb  3.3  /  TBili  1.4<H>  /  DBili  x   /  AST  34  /  ALT  41  /  AlkPhos  141<H>          Daily Weight in k.2 (08 Sep 2021 10:12)    MEDICATIONS  (STANDING):  albuterol/ipratropium for Nebulization 3 milliLiter(s) Nebulizer every 6 hours  aspirin enteric coated 81 milliGRAM(s) Oral daily  atorvastatin 80 milliGRAM(s) Oral daily  buPROPion SR (12-Hour) 150 milliGRAM(s) Oral two times a day  chlorhexidine 2% Cloths 1 Application(s) Topical <User Schedule>  enoxaparin Injectable 40 milliGRAM(s) SubCutaneous daily  insulin lispro (ADMELOG) corrective regimen sliding scale   SubCutaneous Before meals and at bedtime  metoprolol tartrate 75 milliGRAM(s) Oral every 8 hours  pantoprazole    Tablet 40 milliGRAM(s) Oral before breakfast  polyethylene glycol 3350 17 Gram(s) Oral daily  senna 2 Tablet(s) Oral at bedtime  sodium chloride 0.9% lock flush 3 milliLiter(s) IV Push every 8 hours  ticagrelor 90 milliGRAM(s) Oral every 12 hours    MEDICATIONS  (PRN):  acetaminophen   Tablet .. 650 milliGRAM(s) Oral every 6 hours PRN Mild Pain (1 - 3)  bisacodyl 5 milliGRAM(s) Oral every 12 hours PRN Constipation  oxyCODONE    IR 5 milliGRAM(s) Oral every 4 hours PRN Moderate Pain (4 - 6)    CAPILLARY BLOOD GLUCOSE  POCT Blood Glucose.: 110 mg/dL (08 Sep 2021 11:35)  POCT Blood Glucose.: 113 mg/dL (08 Sep 2021 07:26)  POCT Blood Glucose.: 106 mg/dL (07 Sep 2021 21:46)  POCT Blood Glucose.: 118 mg/dL (07 Sep 2021 16:34)         PHYSICAL EXAM  Neurology: alert and oriented x 3, nonfocal, no gross deficits    CV : [id number]    Sternal Wound :   ZELDA sternum , Stable     Lungs: b/l CTA  on room air     Abdomen: soft, nontender, nondistended, positive bowel sounds, last bowel movement     : voids              Extremities:     equal strength throughout   B/lle warm well perfused + DP  no edema  SVG site benign                                       Physical Therapy Rec:   Home  [ x ]   Home w/ PT  [  ]  Rehab  [  ]    Discussed with Cardiothoracic Team at AM rounds.

## 2021-09-08 NOTE — PROGRESS NOTE ADULT - PROBLEM SELECTOR PROBLEM 3
Radiculopathy, cervical
CAD (coronary artery disease)
Radiculopathy, cervical
CAD (coronary artery disease)

## 2021-09-08 NOTE — PROGRESS NOTE ADULT - PROBLEM SELECTOR PLAN 3
Cardiac cath with multivessel CAD  -Pre-op CABG  -Management per CT surgery.
neurology out patient follow up
Cardiac cath with multivessel CAD  -S/p CABG 9/3  -Management per CT surgery.
neurology out patient follow up
Cardiac cath with multivessel CAD  -Pre-op CABG  -Management per CT surgery.
Cardiac cath with multivessel CAD  -S/p CABG 9/3  -Management per CT surgery.

## 2021-09-08 NOTE — PROGRESS NOTE ADULT - PROBLEM SELECTOR PLAN 1
c/w bblocker increased -metorpolol 75 tid ,   atorvastatin  80asa and brillinta  PPI, dvt prophylaxis, incentive spirometry, ambulation  telemetry, strict I/O  wound care and sternal precautions  anticipate home no pt needs upon discharge Friday

## 2021-09-08 NOTE — PROGRESS NOTE ADULT - PROVIDER SPECIALTY LIST ADULT
Critical Care
Pulmonology
CT Surgery
Critical Care
Critical Care
Pulmonology
CT Surgery
Pulmonology
CT Surgery
Pulmonology

## 2021-09-08 NOTE — PROGRESS NOTE ADULT - ATTENDING COMMENTS
change duoneb to prn  fu in office
Ct with mild-moderate paraseptal emphysema, mostly peripheral, clinically well on ra  spirometry poor effort, unreadable  no pulmonary contraindication to planned CABG  suggest periop bronchodilators if wheezes

## 2021-09-08 NOTE — PROGRESS NOTE ADULT - ASSESSMENT
40 y/o M with 20 pack year smoking hx (currently 1ppd), presented to [facility] with R shoulder/neck pain that radiates down his arm x1 week. At the time of admission, denied active chest pain but notes occasional chest pressure with activity. Cardiac cath with diffuse multivessel CAD, now here for CABG evaluation with CT surgery. Pulmonary called to consult as patient is a current smoker. S/p CABG 9/3. Currently breathing comfortably on RA.

## 2021-09-08 NOTE — PROGRESS NOTE ADULT - PROBLEM SELECTOR PLAN 5
MRI cervical spine noted  -Now off steroids in anticipation of CABG.
MRI cervical spine noted  -Steroids d/c pre-op in anticipation of CABG.
MRI cervical spine noted  -Now off steroids in anticipation of CABG.
MRI cervical spine noted  -Steroids d/c pre-op in anticipation of CABG.

## 2021-09-08 NOTE — PROGRESS NOTE ADULT - PROBLEM SELECTOR PROBLEM 2
Current smoker
Emphysema lung
Current smoker
Emphysema lung

## 2021-09-08 NOTE — PROGRESS NOTE ADULT - PROBLEM SELECTOR PLAN 1
Appears to be associated with exertion, but also noted to occur when laying flat  -Possibly 2nd to extensive CAD, but also may be 2nd to underlying lung disease/EAN  -Now s/p CABG 9/3  -Normoxic, keep sats >90% with supplemental O2 PRN  -CT chest with paraseptal emphysema  -Eventual full PFTs outpt   -Can consider outpatient PSG to r/o EAN

## 2021-09-08 NOTE — PROGRESS NOTE ADULT - PROBLEM SELECTOR PROBLEM 1
CAD (coronary artery disease)
CAD (coronary artery disease)
Shortness of breath
CAD (coronary artery disease)
Shortness of breath
CAD (coronary artery disease)
CAD (coronary artery disease)
Shortness of breath
Shortness of breath

## 2021-09-08 NOTE — PROGRESS NOTE ADULT - REASON FOR ADMISSION
shoulder pain, transfer from [facility] , cabg eval
shoulder pain, transfer from [facility] , cabg eval s/p CABG
shoulder pain, transfer from [facility] , cabg eval
cabg eval
shoulder pain, transfer from [facility] , cabg eval

## 2021-09-08 NOTE — PROGRESS NOTE ADULT - PROBLEM SELECTOR PLAN 2
CT chest with paraseptal emphysema  -Not currently exacerbated  -No wheezing on exam, normoxic  -Duoneb q6h. Can d/c bronchodilators on discharge.   -Eventual outpt PFTs

## 2021-09-08 NOTE — PROGRESS NOTE ADULT - ASSESSMENT
40 y/o M with no pmhx current smoker 20 pack year history presented for R shoulder pain started acutely approx a week ago.  Patient is admitted for ACS work up after presenting to the ED.   preop NSTEMI. Extentive CAD with poor targets.   CABG x1 SVG-LAD with LIMA to the SVG performed 9/3/81. 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   Post op extubated <24 hours, Transfused uPRBC 9/4 9/7 Tranusfused uPRBC for post op anemia. transferred to [address] [place] [address] POD#2  Pt is a plavix nonresponder and will require brillinta and asa for incomplete revascularization.  Neurology consulted for cervical radiculopathy  9/8 VSS metoprolol increased to 75 TID f/u outpt with neurology  seen by pulmonary nebulizer now PRN likely discharge Thursday

## 2021-09-08 NOTE — PROGRESS NOTE ADULT - PROBLEM SELECTOR PROBLEM 5
Degenerative joint disease of cervical spine

## 2021-09-08 NOTE — DISCHARGE NOTE NURSING/CASE MANAGEMENT/SOCIAL WORK - PATIENT PORTAL LINK FT
You can access the FollowMyHealth Patient Portal offered by [facility] by registering at the following website: [email or web address]. By joining [facility]’s FollowMyHealth portal, you will also be able to view your health information using other applications (apps) compatible with our system.

## 2021-09-08 NOTE — PROGRESS NOTE ADULT - PROBLEM SELECTOR PLAN 4
Current 1 ppd smoker  -Smoking cessation education provided  -Pt willing to quit  -Nicotine patch offered, refusing at this time.

## 2021-09-09 ENCOUNTER — TRANSCRIPTION ENCOUNTER (OUTPATIENT)
Age: 41
End: 2021-09-09

## 2021-09-09 VITALS — WEIGHT: 162.04 LBS

## 2021-09-09 LAB
ANION GAP SERPL CALC-SCNC: 13 MMOL/L — SIGNIFICANT CHANGE UP (ref 5–17)
BUN SERPL-MCNC: 17 MG/DL — SIGNIFICANT CHANGE UP (ref 7–23)
CALCIUM SERPL-MCNC: 9.2 MG/DL — SIGNIFICANT CHANGE UP (ref 8.4–10.5)
CHLORIDE SERPL-SCNC: 100 MMOL/L — SIGNIFICANT CHANGE UP (ref 96–108)
CO2 SERPL-SCNC: 20 MMOL/L — LOW (ref 22–31)
CREAT SERPL-MCNC: 0.94 MG/DL — SIGNIFICANT CHANGE UP (ref 0.5–1.3)
GLUCOSE SERPL-MCNC: 88 MG/DL — SIGNIFICANT CHANGE UP (ref 70–99)
HCT VFR BLD CALC: 28.4 % — LOW (ref 39–50)
HGB BLD-MCNC: 9.1 G/DL — LOW (ref 13–17)
MAGNESIUM SERPL-MCNC: 2.2 MG/DL — SIGNIFICANT CHANGE UP (ref 1.6–2.6)
MCHC RBC-ENTMCNC: 30.1 PG — SIGNIFICANT CHANGE UP (ref 27–34)
MCHC RBC-ENTMCNC: 32 GM/DL — SIGNIFICANT CHANGE UP (ref 32–36)
MCV RBC AUTO: 94 FL — SIGNIFICANT CHANGE UP (ref 80–100)
NRBC # BLD: 0 /100 WBCS — SIGNIFICANT CHANGE UP (ref 0–0)
PHOSPHATE SERPL-MCNC: 2.8 MG/DL — SIGNIFICANT CHANGE UP (ref 2.5–4.5)
PLATELET # BLD AUTO: 296 K/UL — SIGNIFICANT CHANGE UP (ref 150–400)
POTASSIUM SERPL-MCNC: 4.2 MMOL/L — SIGNIFICANT CHANGE UP (ref 3.5–5.3)
POTASSIUM SERPL-SCNC: 4.2 MMOL/L — SIGNIFICANT CHANGE UP (ref 3.5–5.3)
RBC # BLD: 3.02 M/UL — LOW (ref 4.2–5.8)
RBC # FLD: 14.1 % — SIGNIFICANT CHANGE UP (ref 10.3–14.5)
SODIUM SERPL-SCNC: 133 MMOL/L — LOW (ref 135–145)
WBC # BLD: 15.17 K/UL — HIGH (ref 3.8–10.5)
WBC # FLD AUTO: 15.17 K/UL — HIGH (ref 3.8–10.5)

## 2021-09-09 PROCEDURE — 82330 ASSAY OF CALCIUM: CPT

## 2021-09-09 PROCEDURE — 85396 CLOTTING ASSAY WHOLE BLOOD: CPT

## 2021-09-09 PROCEDURE — 93880 EXTRACRANIAL BILAT STUDY: CPT

## 2021-09-09 PROCEDURE — 85025 COMPLETE CBC W/AUTO DIFF WBC: CPT

## 2021-09-09 PROCEDURE — 94640 AIRWAY INHALATION TREATMENT: CPT

## 2021-09-09 PROCEDURE — P9012: CPT

## 2021-09-09 PROCEDURE — 83036 HEMOGLOBIN GLYCOSYLATED A1C: CPT

## 2021-09-09 PROCEDURE — 80048 BASIC METABOLIC PNL TOTAL CA: CPT

## 2021-09-09 PROCEDURE — 86850 RBC ANTIBODY SCREEN: CPT

## 2021-09-09 PROCEDURE — 81003 URINALYSIS AUTO W/O SCOPE: CPT

## 2021-09-09 PROCEDURE — 87640 STAPH A DNA AMP PROBE: CPT

## 2021-09-09 PROCEDURE — 86923 COMPATIBILITY TEST ELECTRIC: CPT

## 2021-09-09 PROCEDURE — 97116 GAIT TRAINING THERAPY: CPT

## 2021-09-09 PROCEDURE — 82803 BLOOD GASES ANY COMBINATION: CPT

## 2021-09-09 PROCEDURE — 83735 ASSAY OF MAGNESIUM: CPT

## 2021-09-09 PROCEDURE — P9047: CPT

## 2021-09-09 PROCEDURE — 87641 MR-STAPH DNA AMP PROBE: CPT

## 2021-09-09 PROCEDURE — 71250 CT THORAX DX C-: CPT

## 2021-09-09 PROCEDURE — 83605 ASSAY OF LACTIC ACID: CPT

## 2021-09-09 PROCEDURE — 93005 ELECTROCARDIOGRAM TRACING: CPT

## 2021-09-09 PROCEDURE — C1751: CPT

## 2021-09-09 PROCEDURE — 82435 ASSAY OF BLOOD CHLORIDE: CPT

## 2021-09-09 PROCEDURE — 94010 BREATHING CAPACITY TEST: CPT

## 2021-09-09 PROCEDURE — 84443 ASSAY THYROID STIM HORMONE: CPT

## 2021-09-09 PROCEDURE — 82962 GLUCOSE BLOOD TEST: CPT

## 2021-09-09 PROCEDURE — 86965 POOLING BLOOD PLATELETS: CPT

## 2021-09-09 PROCEDURE — 85576 BLOOD PLATELET AGGREGATION: CPT

## 2021-09-09 PROCEDURE — 82550 ASSAY OF CK (CPK): CPT

## 2021-09-09 PROCEDURE — C1769: CPT

## 2021-09-09 PROCEDURE — 99024 POSTOP FOLLOW-UP VISIT: CPT

## 2021-09-09 PROCEDURE — 83880 ASSAY OF NATRIURETIC PEPTIDE: CPT

## 2021-09-09 PROCEDURE — P9016: CPT

## 2021-09-09 PROCEDURE — 36430 TRANSFUSION BLD/BLD COMPNT: CPT

## 2021-09-09 PROCEDURE — 82565 ASSAY OF CREATININE: CPT

## 2021-09-09 PROCEDURE — C9399: CPT

## 2021-09-09 PROCEDURE — 84134 ASSAY OF PREALBUMIN: CPT

## 2021-09-09 PROCEDURE — 82553 CREATINE MB FRACTION: CPT

## 2021-09-09 PROCEDURE — 85730 THROMBOPLASTIN TIME PARTIAL: CPT

## 2021-09-09 PROCEDURE — 71045 X-RAY EXAM CHEST 1 VIEW: CPT | Mod: 26

## 2021-09-09 PROCEDURE — C1889: CPT

## 2021-09-09 PROCEDURE — 84295 ASSAY OF SERUM SODIUM: CPT

## 2021-09-09 PROCEDURE — 81001 URINALYSIS AUTO W/SCOPE: CPT

## 2021-09-09 PROCEDURE — 94002 VENT MGMT INPAT INIT DAY: CPT

## 2021-09-09 PROCEDURE — 85610 PROTHROMBIN TIME: CPT

## 2021-09-09 PROCEDURE — U0005: CPT

## 2021-09-09 PROCEDURE — 85384 FIBRINOGEN ACTIVITY: CPT

## 2021-09-09 PROCEDURE — 85014 HEMATOCRIT: CPT

## 2021-09-09 PROCEDURE — 84484 ASSAY OF TROPONIN QUANT: CPT

## 2021-09-09 PROCEDURE — 86901 BLOOD TYPING SEROLOGIC RH(D): CPT

## 2021-09-09 PROCEDURE — 84439 ASSAY OF FREE THYROXINE: CPT

## 2021-09-09 PROCEDURE — 84480 ASSAY TRIIODOTHYRONINE (T3): CPT

## 2021-09-09 PROCEDURE — 84100 ASSAY OF PHOSPHORUS: CPT

## 2021-09-09 PROCEDURE — 97162 PT EVAL MOD COMPLEX 30 MIN: CPT

## 2021-09-09 PROCEDURE — 84132 ASSAY OF SERUM POTASSIUM: CPT

## 2021-09-09 PROCEDURE — 85027 COMPLETE CBC AUTOMATED: CPT

## 2021-09-09 PROCEDURE — 36600 WITHDRAWAL OF ARTERIAL BLOOD: CPT

## 2021-09-09 PROCEDURE — 80053 COMPREHEN METABOLIC PANEL: CPT

## 2021-09-09 PROCEDURE — 86769 SARS-COV-2 COVID-19 ANTIBODY: CPT

## 2021-09-09 PROCEDURE — 86900 BLOOD TYPING SEROLOGIC ABO: CPT

## 2021-09-09 PROCEDURE — 71045 X-RAY EXAM CHEST 1 VIEW: CPT

## 2021-09-09 PROCEDURE — U0003: CPT

## 2021-09-09 PROCEDURE — 85018 HEMOGLOBIN: CPT

## 2021-09-09 PROCEDURE — P9045: CPT

## 2021-09-09 PROCEDURE — 82947 ASSAY GLUCOSE BLOOD QUANT: CPT

## 2021-09-09 RX ORDER — METOPROLOL TARTRATE 50 MG
100 TABLET ORAL EVERY 12 HOURS
Refills: 0 | Status: DISCONTINUED | OUTPATIENT
Start: 2021-09-09 | End: 2021-09-09

## 2021-09-09 RX ORDER — OXYCODONE HYDROCHLORIDE 5 MG/1
1 TABLET ORAL
Qty: 24 | Refills: 0
Start: 2021-09-09 | End: 2021-09-12

## 2021-09-09 RX ORDER — METOPROLOL TARTRATE 50 MG
1 TABLET ORAL
Qty: 60 | Refills: 0
Start: 2021-09-09 | End: 2021-10-08

## 2021-09-09 RX ORDER — SENNA PLUS 8.6 MG/1
2 TABLET ORAL
Qty: 60 | Refills: 0
Start: 2021-09-09 | End: 2021-10-08

## 2021-09-09 RX ORDER — BUPROPION HYDROCHLORIDE 150 MG/1
1 TABLET, EXTENDED RELEASE ORAL
Qty: 60 | Refills: 0
Start: 2021-09-09 | End: 2021-10-08

## 2021-09-09 RX ORDER — PANTOPRAZOLE SODIUM 20 MG/1
1 TABLET, DELAYED RELEASE ORAL
Qty: 30 | Refills: 0
Start: 2021-09-09 | End: 2021-10-08

## 2021-09-09 RX ORDER — ASPIRIN/CALCIUM CARB/MAGNESIUM 324 MG
1 TABLET ORAL
Qty: 30 | Refills: 0
Start: 2021-09-09 | End: 2021-10-08

## 2021-09-09 RX ORDER — TICAGRELOR 90 MG/1
1 TABLET ORAL
Qty: 60 | Refills: 0
Start: 2021-09-09 | End: 2021-10-08

## 2021-09-09 RX ORDER — ACETAMINOPHEN 500 MG
2 TABLET ORAL
Qty: 240 | Refills: 0
Start: 2021-09-09 | End: 2021-10-08

## 2021-09-09 RX ORDER — POLYETHYLENE GLYCOL 3350 17 G/17G
17 POWDER, FOR SOLUTION ORAL
Qty: 1 | Refills: 0
Start: 2021-09-09 | End: 2021-10-08

## 2021-09-09 RX ORDER — ATORVASTATIN CALCIUM 80 MG/1
1 TABLET, FILM COATED ORAL
Qty: 30 | Refills: 0
Start: 2021-09-09 | End: 2021-10-08

## 2021-09-09 RX ADMIN — OXYCODONE HYDROCHLORIDE 5 MILLIGRAM(S): 5 TABLET ORAL at 11:00

## 2021-09-09 RX ADMIN — OXYCODONE HYDROCHLORIDE 5 MILLIGRAM(S): 5 TABLET ORAL at 10:30

## 2021-09-09 RX ADMIN — BUPROPION HYDROCHLORIDE 150 MILLIGRAM(S): 150 TABLET, EXTENDED RELEASE ORAL at 11:25

## 2021-09-09 RX ADMIN — PANTOPRAZOLE SODIUM 40 MILLIGRAM(S): 20 TABLET, DELAYED RELEASE ORAL at 05:52

## 2021-09-09 RX ADMIN — BUPROPION HYDROCHLORIDE 150 MILLIGRAM(S): 150 TABLET, EXTENDED RELEASE ORAL at 05:43

## 2021-09-09 RX ADMIN — Medication 3 MILLILITER(S): at 11:11

## 2021-09-09 RX ADMIN — Medication 100 MILLIGRAM(S): at 05:42

## 2021-09-09 RX ADMIN — Medication 81 MILLIGRAM(S): at 11:10

## 2021-09-09 RX ADMIN — ENOXAPARIN SODIUM 40 MILLIGRAM(S): 100 INJECTION SUBCUTANEOUS at 11:11

## 2021-09-09 RX ADMIN — OXYCODONE HYDROCHLORIDE 5 MILLIGRAM(S): 5 TABLET ORAL at 05:42

## 2021-09-09 RX ADMIN — OXYCODONE HYDROCHLORIDE 5 MILLIGRAM(S): 5 TABLET ORAL at 06:16

## 2021-09-09 RX ADMIN — TICAGRELOR 90 MILLIGRAM(S): 90 TABLET ORAL at 05:42

## 2021-09-09 RX ADMIN — CHLORHEXIDINE GLUCONATE 1 APPLICATION(S): 213 SOLUTION TOPICAL at 11:25

## 2021-09-09 RX ADMIN — ATORVASTATIN CALCIUM 80 MILLIGRAM(S): 80 TABLET, FILM COATED ORAL at 11:10

## 2021-09-09 RX ADMIN — SODIUM CHLORIDE 3 MILLILITER(S): 9 INJECTION INTRAMUSCULAR; INTRAVENOUS; SUBCUTANEOUS at 05:35

## 2021-09-09 NOTE — DISCHARGE NOTE PROVIDER - PROVIDER TOKENS
PROVIDER:[TOKEN:[[id number]],FOLLOWUP:[1 week]],PROVIDER:[TOKEN:[[id number]],SCHEDULEDAPPT:[09/16/2021],SCHEDULEDAPPTTIME:[09:00 AM]]

## 2021-09-09 NOTE — DISCHARGE NOTE PROVIDER - NSDCMRMEDTOKEN_GEN_ALL_CORE_FT
acetaminophen 325 mg oral tablet: 2 tab(s) orally every 6 hours, As needed, Mild Pain (1 - 3)  aspirin 81 mg oral delayed release tablet: 1 tab(s) orally once a day  atorvastatin 80 mg oral tablet: 1 tab(s) orally once a day  buPROPion 150 mg/12 hours (SR) oral tablet, extended release: 1 tab(s) orally 2 times a day  metoprolol succinate 100 mg oral tablet, extended release: 1 tab(s) orally every 12 hours  oxyCODONE 5 mg oral tablet: 1 tab(s) orally every 4 hours, As needed, Moderate Pain (4 - 6) MDD:4  pantoprazole 40 mg oral delayed release tablet: 1 tab(s) orally once a day (before a meal)  polyethylene glycol 3350 oral powder for reconstitution: 17 gram(s) orally once a day  senna oral tablet: 2 tab(s) orally once a day (at bedtime) PRN  ticagrelor 90 mg oral tablet: 1 tab(s) orally every 12 hours

## 2021-09-09 NOTE — DISCHARGE NOTE PROVIDER - CARE PROVIDERS DIRECT ADDRESSES
,DirectAddress_Unknown,[email or web address]

## 2021-09-09 NOTE — DISCHARGE NOTE PROVIDER - CARE PROVIDER_API CALL
Bradford Brothers)  Cardiology  [address] [place] [address]  Phone: [phone]  Fax: [phone]  Follow Up Time: 1 week    Ga Sow)  Surgery; Surgical Critical Care; Thoracic and Cardiac Surgery  [address] [place] [address]  Phone: [phone]  Fax: [phone]  Scheduled Appointment: 09/16/2021 09:00 AM

## 2021-09-09 NOTE — DISCHARGE NOTE PROVIDER - NSDCPNSUBOBJ_GEN_ALL_CORE
AxOx3  NSR  80  S1S2 RRR  MTI ZELDA Sternum stable  Lung b/l cta on room air  ab soft nontender + BM   Voids  equal strength throughout   SVG site benign   B/le warm well perfused + DP                        9.1    15.17 )-----------( 296      ( 09 Sep 2021 05:26 )             28.4   09-09    133<L>  |  100  |  17  ----------------------------<  88  4.2   |  20<L>  |  0.94    Ca    9.2      09 Sep 2021 05:26  Phos  2.8     09-09  Mg     2.2     09-09    TPro  6.1  /  Alb  3.3  /  TBili  1.4<H>  /  DBili  x   /  AST  34  /  ALT  41  /  AlkPhos  141<H>  09-08  ICU Vital Signs Last 24 Hrs  T(C): 36.9 (09 Sep 2021 04:20), Max: 37.1 (08 Sep 2021 20:24)  T(F): 98.4 (09 Sep 2021 04:20), Max: 98.7 (08 Sep 2021 20:24)  HR: 83 (09 Sep 2021 04:20) (83 - 97)  BP: 100/68 (09 Sep 2021 04:20) (97/66 - 100/68) --  RR: 18 (09 Sep 2021 04:20) (18 - 18)  SpO2: 97% (09 Sep 2021 04:20) (93% - 98%)    Greater then 45 minutes spent on discharge

## 2021-09-09 NOTE — DISCHARGE NOTE PROVIDER - NSDCCPCAREPLAN_GEN_ALL_CORE_FT
PRINCIPAL DISCHARGE DIAGNOSIS  Diagnosis: S/P CABG x 1  Assessment and Plan of Treatment: s/p CABG1 in 9/3/21  1. Daily Shower  2. Weight yourself daily and notify any weight gain greater than 2-3 pounds in 24 hours.  3. Regular diet - low fat, low cholesterol, no added salt.  4. Cleanse Midsternal incision and leg incision daily while showering with warm water and mild soap, pat dry and maintain open to air.   5. Follow Cardiac Surgery Do's and Don'ts discharge instructions.   6. No driving until cleared by MD.   7. No heavy lifting nothing greater than 5 pounds until cleared by MD.   8. Call / Notify MD any fever greater than 101.0  9. Increase Activity as tolerated.  10 Follow up with Dr Sow/ Kristy Markham on September 16 at 9 am  [facility] [address] [facility] [address]  11 Follow  up with Dr Brothers  call for appointment upon discharge -[phone] [address] [facility] [address], [facility] [address]  12 take all medications as prescribed   13 Callour office for fever chills or any concerns

## 2021-09-09 NOTE — DISCHARGE NOTE PROVIDER - HOSPITAL COURSE
40 y/o M with no pmhx current smoker 20 pack year history presented for R shoulder pain started acutely approx a week ago.  Patient is admitted for ACS work up after presenting to the ED.   preop NSTEMI. Extentive CAD with poor targets.   CABG x1 SVG-LAD with LIMA to the SVG performed 9/3/81. 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   Post op extubated <24 hours, Transfused uPRBC 9/4 9/7 Tranusfused uPRBC for post op anemia. transferred to [address] [place] [address] POD#2  Pt is a plavix nonresponder and will require brillinta and asa for incomplete revascularization.  Neurology consulted for cervical radiculopathy  9/8 VSS metoprolol increased to 75 TID f/u outpt with neurology  seen by pulmonary nebulizer now PRN likely discharge Thursday 9/9 VSS stable and eager for discharge on Toprol 100 bid

## 2021-09-10 ENCOUNTER — NON-APPOINTMENT (OUTPATIENT)
Age: 41
End: 2021-09-10

## 2021-09-10 DIAGNOSIS — L53.9 ERYTHEMATOUS CONDITION, UNSPECIFIED: ICD-10-CM

## 2021-09-12 ENCOUNTER — TRANSCRIPTION ENCOUNTER (OUTPATIENT)
Age: 41
End: 2021-09-12

## 2021-09-13 ENCOUNTER — APPOINTMENT (OUTPATIENT)
Dept: CARE COORDINATION | Facility: HOME HEALTH | Age: 41
End: 2021-09-13
Payer: MEDICAID

## 2021-09-13 PROCEDURE — 99024 POSTOP FOLLOW-UP VISIT: CPT

## 2021-09-14 VITALS
HEART RATE: 85 BPM | RESPIRATION RATE: 16 BRPM | WEIGHT: 157 LBS | OXYGEN SATURATION: 98 % | DIASTOLIC BLOOD PRESSURE: 84 MMHG | SYSTOLIC BLOOD PRESSURE: 128 MMHG

## 2021-09-14 NOTE — HISTORY OF PRESENT ILLNESS
[FreeTextEntry1] : 41M s/p CABG Dr. Sow\par pt is a smoker (now on wellbutrin), , lives with wife in basement apartment\par pt c/o difficulty sleeping\par all questions answered

## 2021-09-14 NOTE — PHYSICAL EXAM
[] : no respiratory distress [Respiration, Rhythm And Depth] : normal respiratory rhythm and effort [Auscultation Breath Sounds / Voice Sounds] : lungs were clear to auscultation bilaterally [Heart Rate And Rhythm] : heart rate was normal and rhythm regular [Heart Sounds] : normal S1 and S2 [FreeTextEntry1] : MSI, CT sites and SVG sites without erythema, drainage or warmth, with edges well approximated.  Sternum stable. BLE minimal edema, LLE SVG site with resolving erythema [Bowel Sounds] : normal bowel sounds [Abdomen Soft] : soft [Abdomen Tenderness] : non-tender

## 2021-09-15 ENCOUNTER — TRANSCRIPTION ENCOUNTER (OUTPATIENT)
Age: 41
End: 2021-09-15

## 2021-09-15 PROBLEM — F41.9 MODERATE ANXIETY: Status: ACTIVE | Noted: 2021-09-13

## 2021-09-16 ENCOUNTER — APPOINTMENT (OUTPATIENT)
Dept: CARDIOTHORACIC SURGERY | Facility: CLINIC | Age: 41
End: 2021-09-16
Payer: MEDICAID

## 2021-09-16 VITALS — HEIGHT: 68 IN | WEIGHT: 151 LBS | RESPIRATION RATE: 15 BRPM | BODY MASS INDEX: 22.88 KG/M2 | OXYGEN SATURATION: 98 %

## 2021-09-16 VITALS
RESPIRATION RATE: 15 BRPM | OXYGEN SATURATION: 99 % | HEART RATE: 88 BPM | DIASTOLIC BLOOD PRESSURE: 59 MMHG | TEMPERATURE: 98.4 F | SYSTOLIC BLOOD PRESSURE: 98 MMHG

## 2021-09-16 DIAGNOSIS — F41.9 ANXIETY DISORDER, UNSPECIFIED: ICD-10-CM

## 2021-09-16 PROCEDURE — 99024 POSTOP FOLLOW-UP VISIT: CPT

## 2021-09-21 ENCOUNTER — APPOINTMENT (OUTPATIENT)
Dept: CARDIOTHORACIC SURGERY | Facility: CLINIC | Age: 41
End: 2021-09-21

## 2021-09-21 ENCOUNTER — TRANSCRIPTION ENCOUNTER (OUTPATIENT)
Age: 41
End: 2021-09-21

## 2021-09-23 PROBLEM — M54.12 CERVICAL RADICULOPATHY: Status: ACTIVE | Noted: 2021-09-21

## 2021-09-23 PROBLEM — Z98.890 POST-OPERATIVE STATE: Status: ACTIVE | Noted: 2021-09-13

## 2021-09-24 ENCOUNTER — APPOINTMENT (OUTPATIENT)
Dept: CV DIAGNOSITCS | Facility: HOSPITAL | Age: 41
End: 2021-09-24

## 2021-09-24 ENCOUNTER — APPOINTMENT (OUTPATIENT)
Dept: CARDIOTHORACIC SURGERY | Facility: CLINIC | Age: 41
End: 2021-09-24
Payer: MEDICAID

## 2021-09-24 ENCOUNTER — OUTPATIENT (OUTPATIENT)
Dept: OUTPATIENT SERVICES | Facility: HOSPITAL | Age: 41
LOS: 1 days | End: 2021-09-24
Payer: MEDICAID

## 2021-09-24 VITALS
BODY MASS INDEX: 22.73 KG/M2 | OXYGEN SATURATION: 98 % | HEART RATE: 86 BPM | RESPIRATION RATE: 15 BRPM | WEIGHT: 150 LBS | TEMPERATURE: 98 F | HEIGHT: 68 IN | SYSTOLIC BLOOD PRESSURE: 106 MMHG | DIASTOLIC BLOOD PRESSURE: 69 MMHG

## 2021-09-24 VITALS — DIASTOLIC BLOOD PRESSURE: 70 MMHG | SYSTOLIC BLOOD PRESSURE: 100 MMHG

## 2021-09-24 DIAGNOSIS — Z98.890 OTHER SPECIFIED POSTPROCEDURAL STATES: ICD-10-CM

## 2021-09-24 DIAGNOSIS — Z95.1 PRESENCE OF AORTOCORONARY BYPASS GRAFT: ICD-10-CM

## 2021-09-24 DIAGNOSIS — M54.12 RADICULOPATHY, CERVICAL REGION: ICD-10-CM

## 2021-09-24 PROCEDURE — 99024 POSTOP FOLLOW-UP VISIT: CPT

## 2021-09-24 PROCEDURE — 93306 TTE W/DOPPLER COMPLETE: CPT | Mod: 26

## 2021-09-24 PROCEDURE — 93306 TTE W/DOPPLER COMPLETE: CPT

## 2021-09-24 RX ORDER — OXYCODONE 5 MG/1
5 TABLET ORAL
Refills: 0 | Status: COMPLETED | COMMUNITY
Start: 2021-09-10 | End: 2021-09-24

## 2021-09-24 RX ORDER — DOXYCYCLINE 100 MG/1
100 TABLET, FILM COATED ORAL
Qty: 14 | Refills: 0 | Status: COMPLETED | COMMUNITY
Start: 2021-09-10 | End: 2021-09-24

## 2021-09-24 RX ORDER — PANTOPRAZOLE 40 MG/1
40 TABLET, DELAYED RELEASE ORAL DAILY
Qty: 30 | Refills: 0 | Status: COMPLETED | COMMUNITY
Start: 2021-09-10 | End: 2021-09-24

## 2021-09-24 RX ORDER — POLYETHYLENE GLYCOL 3350 17 G/17G
17 POWDER, FOR SOLUTION ORAL
Refills: 0 | Status: COMPLETED | COMMUNITY
Start: 2021-09-10 | End: 2021-09-24

## 2021-09-24 RX ORDER — ALPRAZOLAM 0.25 MG/1
0.25 TABLET ORAL
Qty: 15 | Refills: 0 | Status: COMPLETED | COMMUNITY
Start: 2021-09-13 | End: 2021-09-24

## 2021-09-27 NOTE — DISCHARGE NOTE PROVIDER - NSDCQMERRANDS_GEN_ALL_CORE
Department of Anesthesiology  Preprocedure Note       Name:  Barbara Reeves   Age:  61 y.o.  :  1958                                          MRN:  [id number]         Date:  2021      Surgeon: Felicita Simmons):  Yudi Oliva MD    Procedure:   Procedure: UPPER EXTREMITY AV FISTULA CREATION (Left )   Anesthesia type: Monitor Anesthesia Care   Pre-op diagnosis: RENAL FAILURE         Medications prior to admission:   Prior to Admission medications    Medication Sig Start Date End Date Taking?  Authorizing Provider   pantoprazole (PROTONIX) 40 MG tablet Take 1 tablet by mouth every morning (before breakfast) 21   Yudi Oliva MD   benzonatate (TESSALON) 100 MG capsule take 1 capsule by mouth three times a day if needed for 10 days 3/3/21   Historical Provider, MD   losartan (COZAAR) 25 MG tablet take 1 tablet by mouth once daily 3/21/21   Historical Provider, MD   guaiFENesin-codeine (GUAIFENESIN AC) 100-10 MG/5ML liquid give 10 milliliters by mouth every 4 hours if needed 3/4/21   Historical Provider, MD   glimepiride (AMARYL) 1 MG tablet Take 1 mg by mouth every morning (before breakfast)    Historical Provider, MD   atorvastatin (LIPITOR) 40 MG tablet Take 1 tablet by mouth nightly 21   Tiesha Garsia MD   traMADol Raguel Lombard) 50 MG tablet take 1 tablet by mouth every 6 hours if needed 10/21/20   Historical Provider, MD   aspirin 325 MG tablet Take 325 mg by mouth daily    Historical Provider, MD   calcium carbonate (TUMS) 500 MG chewable tablet Take 1 tablet by mouth daily as needed     Historical Provider, MD   carvedilol (COREG) 6.25 MG tablet Take 1 tablet by mouth 2 times daily (with meals) 19   Soco Camarillo MD   Multiple Vitamins-Minerals (RENAPLEX-D) TABS Take 1 tablet by mouth every other day Pt takes occasionally 19   Historical Provider, MD   lidocaine-prilocaine (EMLA) 2.5-2.5 % cream  3/18/19   Historical Provider, MD   NIFEdipine (ADALAT CC) 30 MG extended release tablet Take 30 mg by mouth 2 times daily    Historical Provider, MD   furosemide (LASIX) 40 MG tablet Take 40 mg by mouth 2 times daily     Historical Provider, MD       Current medications:    No current facility-administered medications for this visit. No current outpatient medications on file. Facility-Administered Medications Ordered in Other Visits   Medication Dose Route Frequency Provider Last Rate Last Admin    0.9 % sodium chloride infusion   IntraVENous Continuous Beverly Perez MD           Allergies:     Allergies   Allergen Reactions    Lisinopril Swelling       Problem List:    Patient Active Problem List   Diagnosis Code    CKD (chronic kidney disease) stage 4, GFR 15-29 ml/min ([facility]) N18.4    Anemia of chronic renal failure N18.9, D63.1    Type 2 diabetes mellitus with chronic kidney disease on chronic dialysis ([facility]) E11.22, N18.6, Z99.2    Chest pain at rest R07.9    Pneumonia J18.9    Rising PSA level R97.20    Prostatism N40.0    Acute on chronic diastolic congestive heart failure ([facility]) I50.33    Anemia D64.9    Essential hypertension I10    Respiratory distress R06.03    AVF (arteriovenous fistula) ([facility]) I77.0    S/P PTCA - TIM distal LAD - Dr. Steven De Leon 4/1/19 Z98.61    NSTEMI (non-ST elevated myocardial infarction) ([facility] I21.4    S/P arteriovenous (AV) graft placement X03.860    Acute on chronic combined systolic (congestive) and diastolic (congestive) heart failure ([facility]) I50.43    ESRD (end stage renal disease) ([address] N18.6    Coronary artery disease involving native coronary artery of native heart without angina pectoris I25.10    BATSHEVA (obstructive sleep apnea) G47.33    Acute congestive heart failure ([address] I50.9    Small bowel obstruction ([address] K56.609       Past Medical History:        Diagnosis Date    Acute congestive heart failure ([address] 12/21/2016    Acute on chronic diastolic congestive heart failure ([address] 11/24/2018    Anemia 11/25/2018    Anemia of chronic renal failure 10/17/2016    [facility]ovenous fistula) ([facility]     Bowel obstruction ([address] 12/26/2013    CAD (coronary artery disease) 2013    ?     Chest pain at rest 12/21/2016    CHF (congestive heart failure) ([address] 2013    last episode 11/2018    CHF (congestive heart failure), NYHA class I, acute on chronic, combined ([address] 2/9/2021    Chronic kidney disease     CKD (chronic kidney disease) stage 4, GFR 15-29 ml/min ([address] 10/17/2016    Coronary artery disease involving native coronary artery of native heart without angina pectoris     Diabetes mellitus ([address] 2012    NIDDM    Dialysis patient ([address]     Gregoria Coleman  /  Sherry Mendez  /  Mi Rodney    ESRD (end stage renal disease) ([address]     Essential hypertension 11/25/2018    Groin swelling 07/17/2019    Hemodialysis patient ([address] 11/28/2018    [facility] RIGHT DIALYSIS ACCESSTUES [facility] AND [facility]    Hydrocele 07/17/2019    Hyperlipidemia 2013    ON RX    Hypertension 2013    ON RX    MI, old 12/26/2013    NSTEMI (non-ST elevated myocardial infarction) ([address] 6/17/2019    BATSHEVA (obstructive sleep apnea)     Patient in clinical research study 04/01/2019    XIENCE SHORT DAPT    Pneumonia     Prostatism 11/17/2017    Respiratory distress 11/25/2018    Rising PSA level 11/17/2017    S/P arteriovenous (AV) graft placement     S/P PTCA - TIM distal LAD - Dr. Sheri Fabry 4/1/19 4/1/2019    Sleep apnea 2015    pt has machine and does not use it    Small bowel obstruction ([address] 5/1/2021    Type 2 diabetes mellitus with chronic kidney disease on chronic dialysis ([address] 10/17/2016       Past Surgical History:        Procedure Laterality Date    ABDOMEN SURGERY  2013    BOWEL OBSTRUCTION    AV FISTULA CREATION Left 02/20/2019    AV FISTULA REPAIR  07/17/2019    AV fistula revision, branch ligation / Percutaneous angioplasty of proximal anastomosis and outflow tract of dialysis circuit with drug coated balloon  /  Dr Sheri Hernandez  02/09/2021 Dr. Nicolasa Ford, [facility] WITH STENT PLACEMENT  03/31/2019    TIM LAD    CYSTOSCOPY  11/17/2017    DIALYSIS FISTULA CREATION Left 2/20/2019    AV FISTULA CREATION ARM performed by Valeriy Sheets MD at [facility] Left 10/23/2019    LEFT UPPER EXTREMITY AV GRAFT CREATION WITH [id number] ARTEGRAFT, LEFT UPPER EXTREMITY AV FISTULA LIGATION [facility] [address] [facility]    [address]  2007 1 WISDOM TOOTH REMOVED    NOSE SURGERY  1968    CAUTERY TO STOP NOSE BEEDS    OTHER SURGICAL HISTORY Left 03/06/2019    fistulagram    PA GENITAL SURG PROC,MALE UNLISTED N/A 2/16/2018    US  PROSTATE BIOPSY WITH SATURATION performed by Ayala Bill MD at [address] N/A 11/17/2017    CYSTOSCOPY PROSTATE US BIOPSY performed by Ayala Bill MD at [address]  02/16/2018   [facility]    VASCULAR SURGERY  09/20/2019    LUE FISTULAGRAM  /  DR Amaris Wang  /  Findings: Severe stenosis of proximal cephalic vein. / Will plan for LUE AVG placement. [address] [place] [address]       Social History:    Social History     Tobacco Use    Smoking status: Never Smoker    Smokeless tobacco: Never Used   Substance Use Topics    Alcohol use: No                                Counseling given: Not Answered      Vital Signs (Current): There were no vitals filed for this visit.                                            BP Readings from Last 3 Encounters:   09/27/21 127/87   09/15/21 (!) 149/83   06/21/21 (!) 159/104       NPO Status:                                                                                 BMI:   Wt Readings from Last 3 Encounters:   09/27/21 270 lb (122.5 kg)   09/15/21 271 lb (122.9 kg)   06/21/21 263 lb (119.3 kg)     There is no height or weight on file to calculate BMI.    CBC:   Lab Results   Component Value Date    WBC 6.8 06/21/2021    RBC 3.64 06/21/2021    RBC 3.62 09/08/2018    HGB 10.9 06/21/2021    HCT 34.9 06/21/2021    MCV 95.9 06/21/2021    RDW 14.2 06/21/2021     06/21/2021       CMP:   Lab Results   Component Value Date     06/21/2021    K 4.5 06/21/2021    CL 91 06/21/2021    CO2 25 06/21/2021    BUN 59 06/21/2021    CREATININE 5.42 06/21/2021    GFRAA 13 06/21/2021    LABGLOM 11 06/21/2021    GLUCOSE 355 06/21/2021    GLUCOSE 147 09/08/2018    PROT 7.9 05/01/2021    CALCIUM 9.2 06/21/2021    BILITOT 0.87 05/01/2021    ALKPHOS 121 05/01/2021    AST 17 05/01/2021    ALT 21 05/01/2021       POC Tests: No results for input(s): POCGLU, POCNA, POCK, POCCL, POCBUN, POCHEMO, POCHCT in the last 72 hours.     Coags:   Lab Results   Component Value Date    PROTIME 11.0 11/24/2018    INR 1.0 11/24/2018    APTT 31.3 06/19/2019       HCG (If Applicable): No results found for: PREGTESTUR, PREGSERUM, HCG, HCGQUANT     ABGs: No results found for: PHART, PO2ART, YUI9VDO, XIV8BJB, BEART, Y3QHVIZZ     Type & Screen (If Applicable):  No results found for: LABABO, [address]    Anesthesia Evaluation  Patient summary reviewed no history of anesthetic complications:   Airway: Mallampati: III  TM distance: >3 FB   Neck ROM: full  Mouth opening: > = 3 FB Dental: normal exam         Pulmonary: breath sounds clear to auscultation  (+) pneumonia:  sleep apnea: on CPAP and noncompliant,      (-) COPD and asthma                           Cardiovascular:  Exercise tolerance: poor (<4 METS),   (+) hypertension:, past MI: > 6 months, CAD:, CHF:, hyperlipidemia    (-) dysrhythmias,  angina and  RACHEL      Rhythm: regular  Rate: normal           Beta Blocker:  Not on Beta Blocker         Neuro/Psych:      (-) seizures and CVA           GI/Hepatic/Renal:   (+) renal disease: CRI and dialysis,      (-) GERD and liver disease       Endo/Other:    (+) DiabetesType II DM, poorly controlled, , .    (-) hypothyroidism, hyperthyroidism               Abdominal:   (+) obese,           Vascular: negative vascular ROS. Other Findings: Past Medical History:  12/21/2016: Acute congestive heart failure ([facility]  11/24/2018: Acute on chronic diastolic congestive heart failure ([facility]  11/25/2018: Anemia  10/17/2016: Anemia of chronic renal failure  No date: AVF (arteriovenous fistula) ([facility]  12/26/2013:  Bowel obstruction ([facility]  2013: CAD (coronary artery disease)      Comment:  ?  12/21/2016: Chest pain at rest  2013: CHF (congestive heart failure) ([facility]      Comment:  last episode 11/2018 2/9/2021: CHF (congestive heart failure), NYHA class I, acute on   chronic, combined ([facility]  No date: Chronic kidney disease  10/17/2016: CKD (chronic kidney disease) stage 4, GFR 15-29 ml/min   ([facility])  No date: Coronary artery disease involving native coronary artery of   native heart without angina pectoris  2012: Diabetes mellitus ([facility]      Comment:  NIDDM  No date: Dialysis patient ([facility]      Comment:  Regino Mitchell  /  Johnathan Baires  /  Safia Blas  No date: ESRD (end stage renal disease) ([facility]  11/25/2018: Essential hypertension  07/17/2019: Groin swelling  11/28/2018: Hemodialysis patient ([facility]      Comment:  [facility] RIGHT DIALYSIS [facility] AND [facility]                [facility]  07/17/2019: Hydrocele  2013: Hyperlipidemia      Comment:  ON RX  2013: Hypertension      Comment:  ON RX  12/26/2013: MI, old  6/17/2019: NSTEMI (non-ST elevated myocardial infarction) ([facility]  No date: BATSHEVA (obstructive sleep apnea)  04/01/2019: Patient in clinical research study      Comment:  XIENCE SHORT DAPT  No date: Pneumonia  11/17/2017: Prostatism  11/25/2018: Respiratory distress  11/17/2017: Rising PSA level  No date: S/P arteriovenous (AV) graft placement  4/1/2019: S/P PTCA - TIM distal LAD - Dr. Micky Glass 4/1/19 2015: Sleep apnea      Comment:  pt has machine and does not use it  5/1/2021: Small bowel obstruction ([facility]  10/17/2016: Type 2 diabetes No

## 2021-10-08 ENCOUNTER — TRANSCRIPTION ENCOUNTER (OUTPATIENT)
Age: 41
End: 2021-10-08

## 2021-10-13 ENCOUNTER — APPOINTMENT (OUTPATIENT)
Dept: GASTROENTEROLOGY | Facility: CLINIC | Age: 41
End: 2021-10-13
Payer: MEDICAID

## 2021-10-13 VITALS
HEIGHT: 69 IN | WEIGHT: 151 LBS | OXYGEN SATURATION: 99 % | SYSTOLIC BLOOD PRESSURE: 114 MMHG | HEART RATE: 79 BPM | BODY MASS INDEX: 22.36 KG/M2 | DIASTOLIC BLOOD PRESSURE: 60 MMHG | TEMPERATURE: 96.9 F

## 2021-10-13 DIAGNOSIS — Z82.61 FAMILY HISTORY OF ARTHRITIS: ICD-10-CM

## 2021-10-13 DIAGNOSIS — Z78.9 OTHER SPECIFIED HEALTH STATUS: ICD-10-CM

## 2021-10-13 DIAGNOSIS — Z83.3 FAMILY HISTORY OF DIABETES MELLITUS: ICD-10-CM

## 2021-10-13 DIAGNOSIS — K60.2 ANAL FISSURE, UNSPECIFIED: ICD-10-CM

## 2021-10-13 DIAGNOSIS — Z82.49 FAMILY HISTORY OF ISCHEMIC HEART DISEASE AND OTHER DISEASES OF THE CIRCULATORY SYSTEM: ICD-10-CM

## 2021-10-13 PROCEDURE — 99203 OFFICE O/P NEW LOW 30 MIN: CPT

## 2021-10-13 NOTE — REVIEW OF SYSTEMS
[Abdominal Pain] : no abdominal pain [Vomiting] : no vomiting [Constipation] : no constipation [Diarrhea] : no diarrhea [Heartburn] : no heartburn [Melena] : no melena [Negative] : Heme/Lymph [FreeTextEntry7] : Rectal pain and rectal bleeding

## 2021-10-13 NOTE — PHYSICAL EXAM
[General Appearance - Alert] : alert [General Appearance - In No Acute Distress] : in no acute distress [Sclera] : the sclera and conjunctiva were normal [PERRL With Normal Accommodation] : pupils were equal in size, round, and reactive to light [Extraocular Movements] : extraocular movements were intact [Outer Ear] : the ears and nose were normal in appearance [Oropharynx] : the oropharynx was normal [Neck Appearance] : the appearance of the neck was normal [Neck Cervical Mass (___cm)] : no neck mass was observed [Jugular Venous Distention Increased] : there was no jugular-venous distention [Thyroid Diffuse Enlargement] : the thyroid was not enlarged [Thyroid Nodule] : there were no palpable thyroid nodules [Auscultation Breath Sounds / Voice Sounds] : lungs were clear to auscultation bilaterally [Heart Rate And Rhythm] : heart rate was normal and rhythm regular [Heart Sounds] : normal S1 and S2 [Heart Sounds Gallop] : no gallops [Murmurs] : no murmurs [Heart Sounds Pericardial Friction Rub] : no pericardial rub [Full Pulse] : the pedal pulses are present [Edema] : there was no peripheral edema [Bowel Sounds] : normal bowel sounds [Abdomen Tenderness] : non-tender [Abdomen Soft] : soft [Abdomen Mass (___ Cm)] : no abdominal mass palpated [FreeTextEntry1] : External skin tag anteriorly.  Posterior anal fissure that blood on examining finger no mass in the rectum hard stool in the rectum [Penis Abnormality] : the penis was normal [Scrotum] : the scrotum was normal [Testes Mass (___cm)] : there were no testicular masses [Testes Swelling] : the testicles were not swollen [No CVA Tenderness] : no ~M costovertebral angle tenderness [No Spinal Tenderness] : no spinal tenderness [Abnormal Walk] : normal gait [Nail Clubbing] : no clubbing  or cyanosis of the fingernails [Motor Tone] : muscle strength and tone were normal [Musculoskeletal - Swelling] : no joint swelling seen [Skin Color & Pigmentation] : normal skin color and pigmentation [Skin Turgor] : normal skin turgor [] : no rash [Deep Tendon Reflexes (DTR)] : deep tendon reflexes were 2+ and symmetric [Sensation] : the sensory exam was normal to light touch and pinprick [No Focal Deficits] : no focal deficits [Oriented To Time, Place, And Person] : oriented to person, place, and time [Impaired Insight] : insight and judgment were intact [Affect] : the affect was normal

## 2021-10-13 NOTE — HISTORY OF PRESENT ILLNESS
[Heartburn] : denies heartburn [Nausea] : denies nausea [Vomiting] : denies vomiting [Constipation] : denies constipation [Yellow Skin Or Eyes (Jaundice)] : denies jaundice [Abdominal Pain] : denies abdominal pain [Abdominal Swelling] : denies abdominal swelling [Rectal Pain] : rectal pain [GERD] : no gastroesophageal reflux disease [Hiatus Hernia] : no hiatus hernia [Peptic Ulcer Disease] : no peptic ulcer disease [Pancreatitis] : no pancreatitis [Cholelithiasis] : no cholelithiasis [Kidney Stone] : no kidney stone [Inflammatory Bowel Disease] : no inflammatory bowel disease [Irritable Bowel Syndrome] : no irritable bowel syndrome [Diverticulitis] : no diverticulitis [Alcohol Abuse] : no alcohol abuse [Malignancy] : no malignancy [Abdominal Surgery] : no abdominal surgery [Appendectomy] : no appendectomy [Cholecystectomy] : no cholecystectomy [de-identified] : About 1 week ago he developed severe rectal pain and had red blood per rectum.  Initially noted a lump in the anal canal which has since subsided but has persistent severe pain with blood whenever he tries to pass the stool.  He denies any hard stool or softer stool or any straining while moving his bowel.  He tries to avoid bowel movements because it causes severe pain.  No such episode in the past.\par \par He denies any heartburn, dysphagia, melena, epigastric pain. [de-identified] : Rectal bleeding [FreeTextEntry1] : About a month ago patient presented with pain in the right shoulder and right arm had a electrocardiogram done which was found to be abnormal work-up led to diagnosis of coronary artery disease he underwent a coronary artery bypass graft according to patient's the right coronary artery single vessel.  Surgery was done on September 3, 2021.  Patient denies any chest pain or shortness of breath.  No history of hypertension, diabetes mellitus, MI, angina, CHF, TIA, CVA.

## 2021-10-13 NOTE — ASSESSMENT
[FreeTextEntry1] : Patient presents with rectal pain and rectal bleeding physical examination reveals posterior anal fissure with blood on examining finger.  Hard stool in the rectum.  Patient is started on mineral oil 15 mL every night for 5 days, Metamucil 1 tablespoonful in a glass of water every day.  Patient asked to stop the MiraLAX.  Rectiv 0.4% twice a day in  anal area.  Lidocaine 5% 3 times a day in anal area to relieve the pain.

## 2021-10-13 NOTE — HISTORY OF PRESENT ILLNESS
[Heartburn] : denies heartburn [Nausea] : denies nausea [Vomiting] : denies vomiting [Constipation] : denies constipation [Yellow Skin Or Eyes (Jaundice)] : denies jaundice [Abdominal Pain] : denies abdominal pain [Abdominal Swelling] : denies abdominal swelling [Rectal Pain] : rectal pain [GERD] : no gastroesophageal reflux disease [Hiatus Hernia] : no hiatus hernia [Peptic Ulcer Disease] : no peptic ulcer disease [Pancreatitis] : no pancreatitis [Cholelithiasis] : no cholelithiasis [Kidney Stone] : no kidney stone [Inflammatory Bowel Disease] : no inflammatory bowel disease [Irritable Bowel Syndrome] : no irritable bowel syndrome [Diverticulitis] : no diverticulitis [Alcohol Abuse] : no alcohol abuse [Malignancy] : no malignancy [Abdominal Surgery] : no abdominal surgery [Appendectomy] : no appendectomy [Cholecystectomy] : no cholecystectomy [de-identified] : About 1 week ago he developed severe rectal pain and had red blood per rectum.  Initially noted a lump in the anal canal which has since subsided but has persistent severe pain with blood whenever he tries to pass the stool.  He denies any hard stool or softer stool or any straining while moving his bowel.  He tries to avoid bowel movements because it causes severe pain.  No such episode in the past.\par \par He denies any heartburn, dysphagia, melena, epigastric pain. [de-identified] : Rectal bleeding [FreeTextEntry1] : About a month ago patient presented with pain in the right shoulder and right arm had a electrocardiogram done which was found to be abnormal work-up led to diagnosis of coronary artery disease he underwent a coronary artery bypass graft according to patient's the right coronary artery single vessel.  Surgery was done on September 3, 2021.  Patient denies any chest pain or shortness of breath.  No history of hypertension, diabetes mellitus, MI, angina, CHF, TIA, CVA.

## 2021-10-13 NOTE — PHYSICAL EXAM
[General Appearance - Alert] : alert [General Appearance - In No Acute Distress] : in no acute distress [Sclera] : the sclera and conjunctiva were normal [PERRL With Normal Accommodation] : pupils were equal in size, round, and reactive to light [Extraocular Movements] : extraocular movements were intact [Outer Ear] : the ears and nose were normal in appearance [Oropharynx] : the oropharynx was normal [Neck Appearance] : the appearance of the neck was normal [Neck Cervical Mass (___cm)] : no neck mass was observed [Jugular Venous Distention Increased] : there was no jugular-venous distention [Thyroid Diffuse Enlargement] : the thyroid was not enlarged [Thyroid Nodule] : there were no palpable thyroid nodules [Auscultation Breath Sounds / Voice Sounds] : lungs were clear to auscultation bilaterally [Heart Rate And Rhythm] : heart rate was normal and rhythm regular [Heart Sounds] : normal S1 and S2 [Heart Sounds Gallop] : no gallops [Murmurs] : no murmurs [Heart Sounds Pericardial Friction Rub] : no pericardial rub [Full Pulse] : the pedal pulses are present [Edema] : there was no peripheral edema [Bowel Sounds] : normal bowel sounds [Abdomen Tenderness] : non-tender [Abdomen Soft] : soft [Abdomen Mass (___ Cm)] : no abdominal mass palpated [FreeTextEntry1] : External skin tag anteriorly.  Posterior anal fissure that blood on examining finger no mass in the rectum hard stool in the rectum [Penis Abnormality] : the penis was normal [Scrotum] : the scrotum was normal [Testes Mass (___cm)] : there were no testicular masses [Testes Swelling] : the testicles were not swollen [No CVA Tenderness] : no ~M costovertebral angle tenderness [No Spinal Tenderness] : no spinal tenderness [Abnormal Walk] : normal gait [Nail Clubbing] : no clubbing  or cyanosis of the fingernails [Musculoskeletal - Swelling] : no joint swelling seen [Motor Tone] : muscle strength and tone were normal [Skin Color & Pigmentation] : normal skin color and pigmentation [Skin Turgor] : normal skin turgor [] : no rash [Deep Tendon Reflexes (DTR)] : deep tendon reflexes were 2+ and symmetric [Sensation] : the sensory exam was normal to light touch and pinprick [No Focal Deficits] : no focal deficits [Oriented To Time, Place, And Person] : oriented to person, place, and time [Impaired Insight] : insight and judgment were intact [Affect] : the affect was normal

## 2021-10-20 ENCOUNTER — APPOINTMENT (OUTPATIENT)
Dept: CARDIOTHORACIC SURGERY | Facility: CLINIC | Age: 41
End: 2021-10-20

## 2021-10-20 ENCOUNTER — TRANSCRIPTION ENCOUNTER (OUTPATIENT)
Age: 41
End: 2021-10-20

## 2021-10-20 VITALS
RESPIRATION RATE: 13 BRPM | WEIGHT: 149 LBS | DIASTOLIC BLOOD PRESSURE: 70 MMHG | OXYGEN SATURATION: 99 % | HEART RATE: 80 BPM | SYSTOLIC BLOOD PRESSURE: 112 MMHG | HEIGHT: 69 IN | BODY MASS INDEX: 22.07 KG/M2 | TEMPERATURE: 98 F

## 2021-10-20 DIAGNOSIS — R63.0 ANOREXIA: ICD-10-CM

## 2022-03-01 NOTE — PATIENT PROFILE ADULT - NSPROPTRIGHTCAREGIVER_GEN_A_NUR
The skin at the access site was anesthetized. Using a micropuncture needle with the Modified Seldinger technique the right femoral vein was succesfully accessed in a retrograde fashion over the guidewire using a Mmis - Kit Intro 4fr 21ga 10cm 7cm .018in Stf Dil Ntnl Gw.  declines

## 2022-04-15 RX ORDER — TICAGRELOR 90 MG/1
90 TABLET ORAL TWICE DAILY
Qty: 180 | Refills: 3 | Status: ACTIVE | COMMUNITY
Start: 2021-09-10 | End: 1900-01-01

## 2022-04-20 ENCOUNTER — APPOINTMENT (OUTPATIENT)
Dept: CARDIOTHORACIC SURGERY | Facility: CLINIC | Age: 42
End: 2022-04-20
Payer: MEDICAID

## 2022-04-20 DIAGNOSIS — Z87.19 PERSONAL HISTORY OF OTHER DISEASES OF THE DIGESTIVE SYSTEM: ICD-10-CM

## 2022-04-20 DIAGNOSIS — R06.02 SHORTNESS OF BREATH: ICD-10-CM

## 2022-04-20 DIAGNOSIS — Z95.1 PRESENCE OF AORTOCORONARY BYPASS GRAFT: ICD-10-CM

## 2022-04-20 DIAGNOSIS — Z87.891 PERSONAL HISTORY OF NICOTINE DEPENDENCE: ICD-10-CM

## 2022-04-20 DIAGNOSIS — K62.89 OTHER SPECIFIED DISEASES OF ANUS AND RECTUM: ICD-10-CM

## 2022-04-20 DIAGNOSIS — Z20.822 CONTACT WITH AND (SUSPECTED) EXPOSURE TO COVID-19: ICD-10-CM

## 2022-04-20 LAB
CHOLEST SERPL-MCNC: 175 MG/DL
HDLC SERPL-MCNC: 27 MG/DL
LDLC SERPL CALC-MCNC: 75 MG/DL
NONHDLC SERPL-MCNC: 149 MG/DL
TRIGL SERPL-MCNC: 368 MG/DL

## 2022-04-20 PROCEDURE — 99024 POSTOP FOLLOW-UP VISIT: CPT

## 2022-04-20 RX ORDER — ASPIRIN ENTERIC COATED TABLETS 81 MG 81 MG/1
81 TABLET, DELAYED RELEASE ORAL DAILY
Qty: 90 | Refills: 3 | Status: ACTIVE | COMMUNITY
Start: 2021-09-10 | End: 1900-01-01

## 2022-04-20 RX ORDER — EZETIMIBE 10 MG/1
10 TABLET ORAL
Qty: 90 | Refills: 3 | Status: ACTIVE | COMMUNITY
Start: 2022-04-20 | End: 1900-01-01

## 2022-04-20 RX ORDER — LIDOCAINE 5 G/100G
5 OINTMENT TOPICAL
Qty: 45 | Refills: 5 | Status: COMPLETED | COMMUNITY
Start: 2021-10-13 | End: 2022-04-20

## 2022-04-20 RX ORDER — METOPROLOL SUCCINATE 25 MG/1
25 TABLET, EXTENDED RELEASE ORAL
Qty: 90 | Refills: 3 | Status: ACTIVE | COMMUNITY
Start: 2021-09-10 | End: 1900-01-01

## 2022-04-20 RX ORDER — ATORVASTATIN CALCIUM 80 MG/1
80 TABLET, FILM COATED ORAL
Qty: 90 | Refills: 3 | Status: COMPLETED | COMMUNITY
Start: 2021-09-10 | End: 2022-04-20

## 2022-04-20 RX ORDER — NITROGLYCERIN 4 MG/G
0.4 OINTMENT RECTAL
Qty: 1 | Refills: 5 | Status: COMPLETED | COMMUNITY
Start: 2021-10-13 | End: 2022-04-20

## 2022-04-20 RX ORDER — ROSUVASTATIN CALCIUM 40 MG/1
40 TABLET, FILM COATED ORAL
Qty: 90 | Refills: 3 | Status: ACTIVE | COMMUNITY
Start: 2022-04-20 | End: 1900-01-01

## 2022-04-20 RX ORDER — BUPROPION HYDROCHLORIDE 150 MG/1
150 TABLET, FILM COATED, EXTENDED RELEASE ORAL
Qty: 180 | Refills: 3 | Status: COMPLETED | COMMUNITY
Start: 2021-09-10 | End: 2022-04-20

## 2022-04-20 NOTE — REVIEW OF SYSTEMS
[SOB on Exertion] : shortness of breath during exertion [Negative] : Psychiatric [Fever] : no fever [Chills] : no chills [Chest Pain] : no chest pain [Palpitations] : no palpitations [Joint Swelling] : no joint swelling [Dizziness] : no dizziness [Fainting] : no fainting

## 2022-04-20 NOTE — PHYSICAL EXAM
[] : no respiratory distress [Respiration, Rhythm And Depth] : normal respiratory rhythm and effort [Surgical / Traumatic Scar Sternum] : a scar [Bowel Sounds] : normal bowel sounds [No CVA Tenderness] : no ~M costovertebral angle tenderness [Oriented To Time, Place, And Person] : oriented to person, place, and time

## 2022-04-20 NOTE — REASON FOR VISIT
[Follow-Up: _____] : a [unfilled] follow-up visit [Spouse] : spouse [FreeTextEntry1] : coronary artery disease

## 2022-04-20 NOTE — ASSESSMENT
[FreeTextEntry1] : Umberto is a 41 year old male who underwent a CABG x 1 on 9/3/2021. Post op course complicated by acute blood loss anemia requiring PRBC transfusion. He was last seen for office visit on 9/24/2021. He was discharged and instructed to return on as needed basis. Today he presents after returning from his home country of [place]. In [place] he underwent an echocardiogram and stress test which was deemed positive. He was advised to schedule a cardiac angiogram in [place] but he opted to travel to [place]. \par \par His cardiologist in [place] also adjusted his medications and he presents the list with him today. Unable to clarify the correct medications from the list he provided. \par \par Today he report dyspnea on exertion when climbing stairs.  He denies chest pressure or pain, PND, orthopnea or syncope. He denies any smoking of recent. In light of his symptoms I have advised the patient to seek emergent medical treatment (ER). He reports he will take the "new medications" his cardiologist in [place] suggested and will contemplate going to the ER. He has plans to return to [place] this Sunday April 24.

## 2022-04-20 NOTE — HISTORY OF PRESENT ILLNESS
[FreeTextEntry1] : 42 y/o M with no past medical history  current smoker 20 pack year history presented for R shoulder pain started acutely approximately a week ago.  Patient is admitted for ACS work up after presenting to the ED. preop NSTEMI. Extensive CAD with poor targets. CABG x 1 SVG-LAD with LIMA to the SVG performed 9/3/2021. 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 \par \par Post op extubated <24 hour. Transfused PRBC for post op anemia. Pt is a Plavix nonresponder and will require Brilinta and asa for incomplete revascularization.  Neurology consulted for cervical radiculopathy. Metoprolol increased to 75 TID. Follow up outpatient with neurology.

## 2022-10-21 RX ORDER — COLCHICINE 0.6 MG/1
0.6 TABLET ORAL
Qty: 90 | Refills: 0 | Status: ACTIVE | COMMUNITY
Start: 2022-05-06 | End: 1900-01-01

## 2023-04-20 NOTE — H&P ADULT - DOES THIS PATIENT HAVE A HISTORY OF OR HAS BEEN DX WITH HEART FAILURE?
"Chief Complaint  Foot Swelling (Left foot.  He fell on Friday morning, tripped on rug while going in bathroom at daughters house.  )    SUBJECTIVE  Lj Crenshaw presents to [facility]    Patient is 71 unstable gait right foot drop fell and twisted left ankle history of left ankle sprains in the past walking with only a slight limp    PAST MEDICAL HISTORY  No Known Allergies     Past Surgical History:   • EYE SURGERY   • KNEE SURGERY       Social History     Tobacco Use   • Smoking status: Former     Packs/day: 1.00     Years: 11.00     Pack years: 11.00     Types: Cigarettes     Start date:      Quit date:      Years since quittin.3   • Smokeless tobacco: Never   Substance Use Topics   • Alcohol use: Yes     Alcohol/week: 1.0 standard drink     Types: 1 Cans of beer per week     Comment: only if goes out to eat       Family History   Problem Relation Age of Onset   • Heart disease Brother         Health Maintenance Due   Topic Date Due   • TDAP/TD VACCINES (1 - Tdap) Never done   • Pneumococcal Vaccine 65+ (2 - PCV) 2019   • COVID-19 Vaccine (3 - Booster for Pfizer series) 2021   • AAA SCREEN (ONE-TIME)  Never done        Last Completed Colonoscopy          COLORECTAL CANCER SCREENING (COLONOSCOPY - Every 10 Years) Next due on 10/28/2023    10/28/2013  COLONOSCOPY (Done - normal repeat 10 years dr jaime)    10/28/2013  SCANNED - COLONOSCOPY                REVIEW OF SYSTEMS    Neurologic no difference in strength or ability get around bathroom just had nothing to hold onto refill    OBJECTIVE  Vitals:    23 1028   BP: 108/64   Pulse: 95   Temp: 98.6 °F (37 °C)   SpO2: 97%   Weight: 125 kg (276 lb)   Height: 198.1 cm (78\")     Body mass index is 31.9 kg/m².    PHYSICAL EXAM    General no distress  Cardiovascular regular rhythm no murmur  Respiratory lungs clear and equal bilaterally  Neurologic speech is normal gait is normal with his walker " rollator mentation is normal  Musculoskeletal some edema in the ankle no tenderness in the calf whatsoever no tenderness over the fibula or tibia no tenderness over the metatarsals no tenderness could be elicited anywhere in the ankle left ankle sprain    ASSESSMENT & PLAN  There are no diagnoses linked to this encounter.      Left ankle sprain needs a Velcro ankle brace for 4 to 6 weeks            Patient was given instructions and counseling regarding his condition or for health maintenance advice. Please see specific information pulled into the AVS if appropriate.    no

## 2024-01-02 NOTE — H&P ADULT - NSHPPHYSICALEXAM_GEN_ALL_CORE
Get labs done 1 week before fo,low up.      Diagnoses and all orders for this visit:  Routine general medical examination at health care facility  Arteriosclerotic vascular disease  Comments:  Control risk factors, labs OK. Recheck prior to  next visit.  Essential hypertension  Comments:  BP doing OK. Would stay on 1/2 table atenolol/chlorathalidone 50/25 to prevent slow heart rate, etc.  Mixed hyperlipidemia  Comments:  Cholesterol levels doing well.  Orders:  -     Lipid Panel; Future  -     Comprehensive Metabolic Panel; Future  Impaired fasting glucose  -     Hemoglobin A1C; Future  -     CBC; Future  Benign prostatic hyperplasia without lower urinary tract symptoms  Comments:  6/24 PSA reorder.  Orders:  -     Prostate Specific Antigen; Future  Polyp of colon, unspecified part of colon, unspecified type  Comments:  10/21  recheck 10/24 last scope?  Primary osteoarthritis involving multiple joints   
GENERAL: NAD, well-developed, well-nourished  HEAD:  Atraumatic, Normocephalic  EYES: EOMI, PERRL, conjunctiva and sclera clear  ENT: Moist Mucus Membranes present, no ulcers appreciated  NECK: Supple, No JVD  CHEST/LUNG: Clear to auscultation bilaterally; No wheezes, rales or rhonchi  HEART: Regular rate and rhythm; No murmurs, rubs, or gallops, (+)S1, S2  ABDOMEN: Soft, Nontender, Nondistended; Normal Bowel sounds   EXTREMITIES:  2+ Peripheral Pulses, No clubbing, cyanosis, or edema, + R shoulder and R arm tenderness with neck extension and abduction of R arm  PSYCH: normal mood and affect  NEUROLOGY: AAOx3, non-focal  SKIN: No rashes or lesions